# Patient Record
Sex: MALE | Race: WHITE | NOT HISPANIC OR LATINO | Employment: FULL TIME | URBAN - METROPOLITAN AREA
[De-identification: names, ages, dates, MRNs, and addresses within clinical notes are randomized per-mention and may not be internally consistent; named-entity substitution may affect disease eponyms.]

---

## 2017-06-01 ENCOUNTER — HOSPITAL ENCOUNTER (EMERGENCY)
Facility: MEDICAL CENTER | Age: 37
End: 2017-06-01
Attending: EMERGENCY MEDICINE
Payer: COMMERCIAL

## 2017-06-01 VITALS
TEMPERATURE: 98 F | DIASTOLIC BLOOD PRESSURE: 92 MMHG | WEIGHT: 205.03 LBS | RESPIRATION RATE: 16 BRPM | BODY MASS INDEX: 28.7 KG/M2 | HEIGHT: 71 IN | SYSTOLIC BLOOD PRESSURE: 173 MMHG | OXYGEN SATURATION: 94 % | HEART RATE: 89 BPM

## 2017-06-01 DIAGNOSIS — R11.2 NON-INTRACTABLE VOMITING WITH NAUSEA, UNSPECIFIED VOMITING TYPE: ICD-10-CM

## 2017-06-01 DIAGNOSIS — K22.6 MALLORY-WEISS TEAR: ICD-10-CM

## 2017-06-01 DIAGNOSIS — R79.89 LFT ELEVATION: ICD-10-CM

## 2017-06-01 LAB
ALBUMIN SERPL BCP-MCNC: 4.5 G/DL (ref 3.2–4.9)
ALBUMIN/GLOB SERPL: 1.3 G/DL
ALP SERPL-CCNC: 44 U/L (ref 30–99)
ALT SERPL-CCNC: 91 U/L (ref 2–50)
ANION GAP SERPL CALC-SCNC: 16 MMOL/L (ref 0–11.9)
APTT PPP: 23.7 SEC (ref 24.7–36)
AST SERPL-CCNC: 68 U/L (ref 12–45)
BASOPHILS # BLD AUTO: 0.8 % (ref 0–1.8)
BASOPHILS # BLD: 0.05 K/UL (ref 0–0.12)
BILIRUB SERPL-MCNC: 1 MG/DL (ref 0.1–1.5)
BUN SERPL-MCNC: 12 MG/DL (ref 8–22)
CALCIUM SERPL-MCNC: 9.1 MG/DL (ref 8.4–10.2)
CHLORIDE SERPL-SCNC: 100 MMOL/L (ref 96–112)
CO2 SERPL-SCNC: 23 MMOL/L (ref 20–33)
CREAT SERPL-MCNC: 1.2 MG/DL (ref 0.5–1.4)
EOSINOPHIL # BLD AUTO: 0.02 K/UL (ref 0–0.51)
EOSINOPHIL NFR BLD: 0.3 % (ref 0–6.9)
ERYTHROCYTE [DISTWIDTH] IN BLOOD BY AUTOMATED COUNT: 41.9 FL (ref 35.9–50)
GFR SERPL CREATININE-BSD FRML MDRD: >60 ML/MIN/1.73 M 2
GLOBULIN SER CALC-MCNC: 3.4 G/DL (ref 1.9–3.5)
GLUCOSE SERPL-MCNC: 86 MG/DL (ref 65–99)
HCT VFR BLD AUTO: 48.9 % (ref 42–52)
HGB BLD-MCNC: 17.2 G/DL (ref 14–18)
IMM GRANULOCYTES # BLD AUTO: 0.03 K/UL (ref 0–0.11)
IMM GRANULOCYTES NFR BLD AUTO: 0.5 % (ref 0–0.9)
INR PPP: 0.98 (ref 0.87–1.13)
LYMPHOCYTES # BLD AUTO: 1.01 K/UL (ref 1–4.8)
LYMPHOCYTES NFR BLD: 16.3 % (ref 22–41)
MCH RBC QN AUTO: 32.1 PG (ref 27–33)
MCHC RBC AUTO-ENTMCNC: 35.2 G/DL (ref 33.7–35.3)
MCV RBC AUTO: 91.2 FL (ref 81.4–97.8)
MONOCYTES # BLD AUTO: 0.5 K/UL (ref 0–0.85)
MONOCYTES NFR BLD AUTO: 8.1 % (ref 0–13.4)
NEUTROPHILS # BLD AUTO: 4.59 K/UL (ref 1.82–7.42)
NEUTROPHILS NFR BLD: 74 % (ref 44–72)
NRBC # BLD AUTO: 0 K/UL
NRBC BLD AUTO-RTO: 0 /100 WBC
PLATELET # BLD AUTO: 237 K/UL (ref 164–446)
PMV BLD AUTO: 7.9 FL (ref 9–12.9)
POTASSIUM SERPL-SCNC: 3.7 MMOL/L (ref 3.6–5.5)
PROT SERPL-MCNC: 7.9 G/DL (ref 6–8.2)
PROTHROMBIN TIME: 12.8 SEC (ref 12–14.6)
RBC # BLD AUTO: 5.36 M/UL (ref 4.7–6.1)
SODIUM SERPL-SCNC: 139 MMOL/L (ref 135–145)
WBC # BLD AUTO: 6.2 K/UL (ref 4.8–10.8)

## 2017-06-01 PROCEDURE — 85730 THROMBOPLASTIN TIME PARTIAL: CPT

## 2017-06-01 PROCEDURE — 700105 HCHG RX REV CODE 258: Performed by: EMERGENCY MEDICINE

## 2017-06-01 PROCEDURE — 99284 EMERGENCY DEPT VISIT MOD MDM: CPT

## 2017-06-01 PROCEDURE — 85610 PROTHROMBIN TIME: CPT

## 2017-06-01 PROCEDURE — 96361 HYDRATE IV INFUSION ADD-ON: CPT

## 2017-06-01 PROCEDURE — 80053 COMPREHEN METABOLIC PANEL: CPT

## 2017-06-01 PROCEDURE — 700111 HCHG RX REV CODE 636 W/ 250 OVERRIDE (IP): Performed by: EMERGENCY MEDICINE

## 2017-06-01 PROCEDURE — 36415 COLL VENOUS BLD VENIPUNCTURE: CPT

## 2017-06-01 PROCEDURE — 96374 THER/PROPH/DIAG INJ IV PUSH: CPT

## 2017-06-01 PROCEDURE — 85025 COMPLETE CBC W/AUTO DIFF WBC: CPT

## 2017-06-01 RX ORDER — SODIUM CHLORIDE 9 MG/ML
1000 INJECTION, SOLUTION INTRAVENOUS ONCE
Status: COMPLETED | OUTPATIENT
Start: 2017-06-01 | End: 2017-06-01

## 2017-06-01 RX ORDER — ONDANSETRON 4 MG/1
4 TABLET, ORALLY DISINTEGRATING ORAL EVERY 8 HOURS PRN
Qty: 10 TAB | Refills: 0 | Status: SHIPPED | OUTPATIENT
Start: 2017-06-01 | End: 2021-10-08

## 2017-06-01 RX ORDER — CEFTRIAXONE 2 G/1
2 INJECTION, POWDER, FOR SOLUTION INTRAMUSCULAR; INTRAVENOUS ONCE
Status: DISCONTINUED | OUTPATIENT
Start: 2017-06-01 | End: 2017-06-01

## 2017-06-01 RX ORDER — ONDANSETRON 2 MG/ML
4 INJECTION INTRAMUSCULAR; INTRAVENOUS ONCE
Status: COMPLETED | OUTPATIENT
Start: 2017-06-01 | End: 2017-06-01

## 2017-06-01 RX ADMIN — ONDANSETRON 4 MG: 2 INJECTION INTRAMUSCULAR; INTRAVENOUS at 09:44

## 2017-06-01 RX ADMIN — SODIUM CHLORIDE 1000 ML: 9 INJECTION, SOLUTION INTRAVENOUS at 09:56

## 2017-06-01 ASSESSMENT — PAIN SCALES - GENERAL
PAINLEVEL_OUTOF10: 0
PAINLEVEL_OUTOF10: 0

## 2017-06-01 NOTE — DISCHARGE INSTRUCTIONS
Nausea and Vomiting  Nausea is a sick feeling that often comes before throwing up (vomiting). Vomiting is a reflex where stomach contents come out of your mouth. Vomiting can cause severe loss of body fluids (dehydration). Children and elderly adults can become dehydrated quickly, especially if they also have diarrhea. Nausea and vomiting are symptoms of a condition or disease. It is important to find the cause of your symptoms.  CAUSES   · Direct irritation of the stomach lining. This irritation can result from increased acid production (gastroesophageal reflux disease), infection, food poisoning, taking certain medicines (such as nonsteroidal anti-inflammatory drugs), alcohol use, or tobacco use.  · Signals from the brain. These signals could be caused by a headache, heat exposure, an inner ear disturbance, increased pressure in the brain from injury, infection, a tumor, or a concussion, pain, emotional stimulus, or metabolic problems.  · An obstruction in the gastrointestinal tract (bowel obstruction).  · Illnesses such as diabetes, hepatitis, gallbladder problems, appendicitis, kidney problems, cancer, sepsis, atypical symptoms of a heart attack, or eating disorders.  · Medical treatments such as chemotherapy and radiation.  · Receiving medicine that makes you sleep (general anesthetic) during surgery.  DIAGNOSIS  Your caregiver may ask for tests to be done if the problems do not improve after a few days. Tests may also be done if symptoms are severe or if the reason for the nausea and vomiting is not clear. Tests may include:  · Urine tests.  · Blood tests.  · Stool tests.  · Cultures (to look for evidence of infection).  · X-rays or other imaging studies.  Test results can help your caregiver make decisions about treatment or the need for additional tests.  TREATMENT  You need to stay well hydrated. Drink frequently but in small amounts. You may wish to drink water, sports drinks, clear broth, or eat frozen  ice pops or gelatin dessert to help stay hydrated. When you eat, eating slowly may help prevent nausea. There are also some antinausea medicines that may help prevent nausea.  HOME CARE INSTRUCTIONS   · Take all medicine as directed by your caregiver.  · If you do not have an appetite, do not force yourself to eat. However, you must continue to drink fluids.  · If you have an appetite, eat a normal diet unless your caregiver tells you differently.  · Eat a variety of complex carbohydrates (rice, wheat, potatoes, bread), lean meats, yogurt, fruits, and vegetables.  · Avoid high-fat foods because they are more difficult to digest.  · Drink enough water and fluids to keep your urine clear or pale yellow.  · If you are dehydrated, ask your caregiver for specific rehydration instructions. Signs of dehydration may include:  · Severe thirst.  · Dry lips and mouth.  · Dizziness.  · Dark urine.  · Decreasing urine frequency and amount.  · Confusion.  · Rapid breathing or pulse.  SEEK IMMEDIATE MEDICAL CARE IF:   · You have blood or brown flecks (like coffee grounds) in your vomit.  · You have black or bloody stools.  · You have a severe headache or stiff neck.  · You are confused.  · You have severe abdominal pain.  · You have chest pain or trouble breathing.  · You do not urinate at least once every 8 hours.  · You develop cold or clammy skin.  · You continue to vomit for longer than 24 to 48 hours.  · You have a fever.  MAKE SURE YOU:   · Understand these instructions.  · Will watch your condition.  · Will get help right away if you are not doing well or get worse.     This information is not intended to replace advice given to you by your health care provider. Make sure you discuss any questions you have with your health care provider.     Document Released: 12/18/2006 Document Revised: 03/11/2013 Document Reviewed: 05/16/2012  [employer] Interactive Patient Education ©2016 [employer]    Geoffrey  Syndrome  Shelbie-Arthur syndrome refers to bleeding from tears in the lining of the tube that connects your throat to your stomach (esophagus). The tears occur at the entrance to your stomach. Usually the bleeding stops by itself after 24-48 hours. Surgery may be needed. This condition is not usually fatal.   CAUSES  The tears that cause bleeding are often caused by severe or lasting vomiting or coughing.   RISK FACTORS  · Abusing or drinking too much alcohol.  · Having certain eating disorders, such as bulimia.  SIGNS AND SYMPTOMS  · Vomiting of bright red or black coffee-ground-like material.  · Having black, tarry stools.  · Fainting or experiencing loss of consciousness.  DIAGNOSIS   The procedure often used to diagnose Shelbie-Arthur syndrome is an esophagogastroduodenoscopy (EGD). During an EGD procedure, a small, flexible, tube-like telescope (endoscope) is put into your mouth, passed through your esophagus into your stomach, and then into your small bowel. An EGD will show your health care provider where the tear is.   TREATMENT   It is necessary to stop the bleeding as soon as possible. The possible treatments to stop the bleeding include injecting medicine into bleeding areas to block (clot) the blood vessels. If bleeding is significant, it may be necessary to replace the blood lost with a blood transfusion.  HOME CARE INSTRUCTIONS  Treat any conditions that may be causing the lasting or recurrent vomiting or coughing. This may include getting help for alcoholism or an eating disorder, if this applies.  SEEK MEDICAL CARE IF:   You have nausea or vomiting.   SEEK IMMEDIATE MEDICAL CARE IF:  · You have persistent dizziness, light-headedness, or fainting.  · Your vomiting returns, or you have vomit that is bright red or looks like black coffee grounds.  · You have bloody or black, tarry-looking stools.  · You have chest pain.  · You cannot eat or drink.  MAKE SURE YOU:   · Understand these  instructions.  · Will watch your condition.  · Will get help right away if you are not doing well or get worse.     This information is not intended to replace advice given to you by your health care provider. Make sure you discuss any questions you have with your health care provider.     Document Released: 05/07/2007 Document Revised: 01/08/2016 Document Reviewed: 02/11/2015  [employer] Interactive Patient Education ©2016 [employer]vier Inc.

## 2017-06-01 NOTE — ED NOTES
ERP at bedside. Pt agrees with plan of care discussed by ERP. AIDET acknowledged with patient. IV established. Blood sent to lab.Leann in low position, side rail up for pt safety. Call light within reach. Will continue to monitor.

## 2017-06-01 NOTE — ED AVS SNAPSHOT
[employer] Access Code: [email or web address]  Expires: 7/1/2017 11:09 AM    Your email address is not on file at [facility].  Email Addresses are required for you to sign up for [employer], please contact [phone] to verify your personal information and to provide your email address prior to attempting to register for [employer].    Obinna Bryan  [address] [place] [address]    [employer]  A secure, online tool to manage your health information     [facility]’s [employer]® is a secure, online tool that connects you to your personalized health information from the privacy of your home -- day or night - making it very easy for you to manage your healthcare. Once the activation process is completed, you can even access your medical information using the [employer] kaylene, which is available for free in the Apple Kaylene store or Google Play store.     To learn more about [employer], visit [email or web address]    There are two levels of access available (as shown below):   My Chart Features  [facility] Primary Care Doctor [facility]  Specialists [facility]  Urgent  Care Non-[facility] Primary Care Doctor   Email your healthcare team securely and privately 24/7 X X X    Manage appointments: schedule your next appointment; view details of past/upcoming appointments X      Request prescription refills. X      View recent personal medical records, including lab and immunizations X X X X   View health record, including health history, allergies, medications X X X X   Read reports about your outpatient visits, procedures, consult and ER notes X X X X   See your discharge summary, which is a recap of your hospital and/or ER visit that includes your diagnosis, lab results, and care plan X X  X     How to register for [employer]:  Once your e-mail address has been verified, follow the following steps to sign up for [employer].     1. Go to  [email or web address]  2. Click on the Sign Up Now box, which takes you to the New Member Sign Up page. You  will need to provide the following information:  a. Enter your [employer] Access Code exactly as it appears at the top of this page. (You will not need to use this code after you’ve completed the sign-up process. If you do not sign up before the expiration date, you must request a new code.)   b. Enter your date of birth.   c. Enter your home email address.   d. Click Submit, and follow the next screen’s instructions.  3. Create a En[employer]iot ID. This will be your [employer] login ID and cannot be changed, so think of one that is secure and easy to remember.  4. Create a [employer] password. You can change your password at any time.  5. Enter your Password Reset Question and Answer. This can be used at a later time if you forget your password.   6. Enter your e-mail address. This allows you to receive e-mail notifications when new information is available in [employer].  7. Click Sign Up. You can now view your health information.    For assistance activating your [employer] account, call [phone]

## 2017-06-01 NOTE — ED PROVIDER NOTES
"ED Provider Note    CHIEF COMPLAINT  Chief Complaint   Patient presents with   • Blood in Vomit       [facility]  Obinna Bryan is a 37 y.o. male who presents for evaluation of blood in his vomit. The patient states that he went to work today and while speaking with someone became nauseated. He had a couple episodes of vomiting. He states he is not having any abdominal pain. He is had no fevers. He had episode of vomiting and states he noticed some drops of red blood in his emesis. He states at this point his nausea is almost completely resolved. He denies any abdominal pain. He has not been taking anti-inflammatories or aspirin. He has no history of bleeding disorders. He is on no blood thinners. He states he has had a chronic cough over the past 3 weeks or so. He denies any black or tarry stools.    REVIEW OF SYSTEMS  See HPI for further details. All other systems are negative.     PAST MEDICAL HISTORY  No past medical history on file.    FAMILY HISTORY  No family history on file.    SOCIAL HISTORY  Social History     Social History   • Marital Status: Single     Spouse Name: N/A   • Number of Children: N/A   • Years of Education: N/A     Social History Main Topics   • Smoking status: Never Smoker    • Smokeless tobacco: Never Used   • Alcohol Use: Yes      Comment: socially   • Drug Use: No   • Sexual Activity: Not on file     Other Topics Concern   • Not on file     Social History Narrative   • No narrative on file       SURGICAL HISTORY  No past surgical history on file.    CURRENT MEDICATIONS  Home Medications     **Home medications have not yet been reviewed for this encounter**          ALLERGIES  No Known Allergies    PHYSICAL EXAM  VITAL SIGNS: /92 mmHg  Pulse 113  Temp(Src) 35.9 °C (96.6 °F)  Resp 18  Ht 1.803 m (5' 11\")  Wt 93 kg (205 lb 0.4 oz)  BMI 28.61 kg/m2    Constitutional: Well developed, Well nourished, No acute distress, Non-toxic appearance.   HENT: Normocephalic, Atraumatic.   Eyes:  " EOMI, Conjunctiva normal, No discharge.   Cardiovascular: Slight tachycardia, Normal rhythm, No murmurs, No rubs, No gallops.   Thorax & Lungs: Lungs clear to auscultation bilaterally without wheezes, rales or rhonchi. No respiratory distress.   Abdomen: Soft and nontender.  Skin: Warm, Dry.   Musculoskeletal: Good range of motion in all major joints.  Neurologic: Awake alert, No focal deficits noted.           COURSE & MEDICAL DECISION MAKING  Pertinent Labs & Imaging studies reviewed. (See chart for details)  This is a 37-year-old here for evaluation of nausea, vomiting, and a small amount of bright red blood in his emesis. He is hypertensive and slightly tachycardic on arrival. He is afebrile. He states he has no history of hypertension. His blood pressures come down nicely while here in the emergency department without treatment. He is hydrated with 1 L of normal saline and treated with Zofran. He's had no further vomiting and states his nausea is resolved. Laboratories include chemistries which are notable for normal BUN and creatinine. AST and ALT are elevated at 68 and 91 respectively. Total bilirubin is normal. INR is normal. CBC shows a normal white count, normal hemoglobin, and normal platelet count. On repeat evaluation the patient is resting comfortably. His heart rate is normalized. I discussed results of the tests with him. I believe his bleeding is most likely secondary to a Shelbie-Arthur tear. I do not think he requires an emergent hospitalization or GI consultation. I discussed the elevated liver function studies with him and we have talked about alcohol consumption. The patient also chews tobacco and I've discussed the possibility of throat, esophageal, or stomach cancers as a result of his tobacco chewing. We have discussed esophageal varices which I think are very unlikely based on the fact he had such a small amount of bleeding. At this time I do not think he requires acute hospitalization or  further evaluation in the emergency department. I've referred him to the [facility] medical group for general follow-up. I have also referred him to GI consultants for follow-up. He is given a discharge instruction sheet on nausea and vomiting as well as Shelbie-Arthur tears. He is provided a prescription for Zofran.    FINAL IMPRESSION  1. Nausea and vomiting  2. Small amount of bright red blood in his emesis which I believe is secondary to a Shelbie-Arthur tear  3. Elevated liver function studies  4. Elevated blood pressure which seems to normalize while in the emergency department.         Electronically signed by: Kwesi Benavides, 6/1/2017 9:09 AM

## 2017-06-01 NOTE — ED NOTES
Assisted with the discharge process. He will be sent home with instructions to follow up with primary care provider, or return to ED if symptoms worsen.  Pt verbalizes understanding of educational materials provided during this visit and agrees to follow our recommendations.  No exacerbations of initial presentations are noted.  Scripts are given upon dismissal from our facility.  Ambulates independently and denies any new needs.

## 2017-06-01 NOTE — ED AVS SNAPSHOT
Home Care Instructions                                                                                                                Obinna Bryan   MRN: [id number]    Department:  [facility], Emergency Dept   Date of Visit:  6/1/2017            [facility], Emergency Dept    [address] [place] [address]    Phone:  [phone]      You were seen by     Kwesi Benavides M.D.      Your Diagnosis Was     Non-intractable vomiting with nausea, unspecified vomiting type     R11.2       These are the medications you received during your hospitalization from 06/01/2017 0825 to 06/01/2017 1109     Date/Time Order Dose Route Action    06/01/2017 0944 ondansetron (ZOFRAN) syringe/vial injection 4 mg 4 mg Intravenous Given    06/01/2017 0956 NS infusion 1,000 mL 1,000 mL Intravenous New Bag      Follow-up Information     1. Schedule an appointment as soon as possible for a visit with [facility].    Contact information    [address] [facility], [address]  [place]  [phone]          2. Schedule an appointment as soon as possible for a visit with [facility].    Contact information    [place] [address] [phone]        Medication Information     Review all of your home medications and newly ordered medications with your primary doctor and/or pharmacist as soon as possible. Follow medication instructions as directed by your doctor and/or pharmacist.     Please keep your complete medication list with you and share with your physician. Update the information when medications are discontinued, doses are changed, or new medications (including over-the-counter products) are added; and carry medication information at all times in the event of emergency situations.               Medication List      START taking these medications        Instructions    Morning Afternoon Evening Bedtime    ondansetron 4 MG Tbdp   Commonly known as:  ZOFRAN  ODT        Take 1 Tab by mouth every 8 hours as needed.   Dose:  4 mg                             Where to Get Your Medications      You can get these medications from any pharmacy     Bring a paper prescription for each of these medications    - ondansetron 4 MG Tbdp            Procedures and tests performed during your visit     APTT    CBC WITH DIFFERENTIAL    COMP METABOLIC PANEL    ESTIMATED GFR    PROTHROMBIN TIME (INR)        Discharge Instructions       Nausea and Vomiting  Nausea is a sick feeling that often comes before throwing up (vomiting). Vomiting is a reflex where stomach contents come out of your mouth. Vomiting can cause severe loss of body fluids (dehydration). Children and elderly adults can become dehydrated quickly, especially if they also have diarrhea. Nausea and vomiting are symptoms of a condition or disease. It is important to find the cause of your symptoms.  CAUSES   · Direct irritation of the stomach lining. This irritation can result from increased acid production (gastroesophageal reflux disease), infection, food poisoning, taking certain medicines (such as nonsteroidal anti-inflammatory drugs), alcohol use, or tobacco use.  · Signals from the brain. These signals could be caused by a headache, heat exposure, an inner ear disturbance, increased pressure in the brain from injury, infection, a tumor, or a concussion, pain, emotional stimulus, or metabolic problems.  · An obstruction in the gastrointestinal tract (bowel obstruction).  · Illnesses such as diabetes, hepatitis, gallbladder problems, appendicitis, kidney problems, cancer, sepsis, atypical symptoms of a heart attack, or eating disorders.  · Medical treatments such as chemotherapy and radiation.  · Receiving medicine that makes you sleep (general anesthetic) during surgery.  DIAGNOSIS  Your caregiver may ask for tests to be done if the problems do not improve after a few days. Tests may also be done if symptoms are severe or  if the reason for the nausea and vomiting is not clear. Tests may include:  · Urine tests.  · Blood tests.  · Stool tests.  · Cultures (to look for evidence of infection).  · X-rays or other imaging studies.  Test results can help your caregiver make decisions about treatment or the need for additional tests.  TREATMENT  You need to stay well hydrated. Drink frequently but in small amounts. You may wish to drink water, sports drinks, clear broth, or eat frozen ice pops or gelatin dessert to help stay hydrated. When you eat, eating slowly may help prevent nausea. There are also some antinausea medicines that may help prevent nausea.  HOME CARE INSTRUCTIONS   · Take all medicine as directed by your caregiver.  · If you do not have an appetite, do not force yourself to eat. However, you must continue to drink fluids.  · If you have an appetite, eat a normal diet unless your caregiver tells you differently.  · Eat a variety of complex carbohydrates (rice, wheat, potatoes, bread), lean meats, yogurt, fruits, and vegetables.  · Avoid high-fat foods because they are more difficult to digest.  · Drink enough water and fluids to keep your urine clear or pale yellow.  · If you are dehydrated, ask your caregiver for specific rehydration instructions. Signs of dehydration may include:  · Severe thirst.  · Dry lips and mouth.  · Dizziness.  · Dark urine.  · Decreasing urine frequency and amount.  · Confusion.  · Rapid breathing or pulse.  SEEK IMMEDIATE MEDICAL CARE IF:   · You have blood or brown flecks (like coffee grounds) in your vomit.  · You have black or bloody stools.  · You have a severe headache or stiff neck.  · You are confused.  · You have severe abdominal pain.  · You have chest pain or trouble breathing.  · You do not urinate at least once every 8 hours.  · You develop cold or clammy skin.  · You continue to vomit for longer than 24 to 48 hours.  · You have a fever.  MAKE SURE YOU:   · Understand these  instructions.  · Will watch your condition.  · Will get help right away if you are not doing well or get worse.     This information is not intended to replace advice given to you by your health care provider. Make sure you discuss any questions you have with your health care provider.     Document Released: 12/18/2006 Document Revised: 03/11/2013 Document Reviewed: 05/16/2012  [employer] Interactive Patient Education ©2016 [employer].    Shelbie-Arthur Syndrome  Shelbie-Arthur syndrome refers to bleeding from tears in the lining of the tube that connects your throat to your stomach (esophagus). The tears occur at the entrance to your stomach. Usually the bleeding stops by itself after 24-48 hours. Surgery may be needed. This condition is not usually fatal.   CAUSES  The tears that cause bleeding are often caused by severe or lasting vomiting or coughing.   RISK FACTORS  · Abusing or drinking too much alcohol.  · Having certain eating disorders, such as bulimia.  SIGNS AND SYMPTOMS  · Vomiting of bright red or black coffee-ground-like material.  · Having black, tarry stools.  · Fainting or experiencing loss of consciousness.  DIAGNOSIS   The procedure often used to diagnose Shelbie-Arthur syndrome is an esophagogastroduodenoscopy (EGD). During an EGD procedure, a small, flexible, tube-like telescope (endoscope) is put into your mouth, passed through your esophagus into your stomach, and then into your small bowel. An EGD will show your health care provider where the tear is.   TREATMENT   It is necessary to stop the bleeding as soon as possible. The possible treatments to stop the bleeding include injecting medicine into bleeding areas to block (clot) the blood vessels. If bleeding is significant, it may be necessary to replace the blood lost with a blood transfusion.  HOME CARE INSTRUCTIONS  Treat any conditions that may be causing the lasting or recurrent vomiting or coughing. This may include getting help for  alcoholism or an eating disorder, if this applies.  SEEK MEDICAL CARE IF:   You have nausea or vomiting.   SEEK IMMEDIATE MEDICAL CARE IF:  · You have persistent dizziness, light-headedness, or fainting.  · Your vomiting returns, or you have vomit that is bright red or looks like black coffee grounds.  · You have bloody or black, tarry-looking stools.  · You have chest pain.  · You cannot eat or drink.  MAKE SURE YOU:   · Understand these instructions.  · Will watch your condition.  · Will get help right away if you are not doing well or get worse.     This information is not intended to replace advice given to you by your health care provider. Make sure you discuss any questions you have with your health care provider.     Document Released: 05/07/2007 Document Revised: 01/08/2016 Document Reviewed: 02/11/2015  [employer] Interactive Patient Education ©2016 [employer]            Patient Information     Patient Information    Following emergency treatment: all patient requiring follow-up care must return either to a private physician or a clinic if your condition worsens before you are able to obtain further medical attention, please return to the emergency room.     Billing Information    At [facility], we work to make the billing process streamlined for our patients.  Our Representatives are here to answer any questions you may have regarding your hospital bill.  If you have insurance coverage and have supplied your insurance information to us, we will submit a claim to your insurer on your behalf.  Should you have any questions regarding your bill, we can be reached online or by phone as follows:  Online: You are able pay your bills online or live chat with our representatives about any billing questions you may have. We are here to help Monday - Friday from 8:00am to 7:30pm and 9:00am - 12:00pm on Saturdays.  Please visit [email or web address]  for more information.   Phone:   [phone] or [phone]    Please note that your emergency physician, surgeon, pathologist, radiologist, anesthesiologist, and other specialists are not employed by [facility] and will therefore bill separately for their services.  Please contact them directly for any questions concerning their bills at the numbers below:     Emer[facility] Physician Services:  [phone]  [facility]:  [phone]  [facility]:  [phone]  [facility]:  [phone]    1. Your final bill may vary from the amount quoted upon discharge if all procedures are not complete at that time, or if your doctor has additional procedures of which we are not aware. You will receive an additional bill if you return to the Emergency Department at [facility] for suture removal regardless of the facility of which the sutures were placed.     2. Please arrange for settlement of this account at the emergency registration.    3. All self-pay accounts are due in full at the time of treatment.  If you are unable to meet this obligation then payment is expected within 4-5 days.     4. If you have had radiology studies (CT, X-ray, Ultrasound, MRI), you have received a preliminary result during your emergency department visit. Please contact the radiology department [phone] to receive a copy of your final result. Please discuss the Final result with your primary physician or with the follow up physician provided.     Crisis Hotline:  [place] Crisis Hotline:  4-413-TXXDCZI or 1-570.692.6618  [place] Crisis Hotline:    1-309.894.7747 or 108-281-0512         ED Discharge Follow Up Questions    1. In order to provide you with very good care, we would like to follow up with a phone call in the next few days.  May we have your permission to contact you?     YES /  NO    2. What is the best phone number to call you? (       )_____-__________    3. What is the best time to call you?      Morning  /   Afternoon  /  Evening                   Patient Signature:  ____________________________________________________________    Date:  ____________________________________________________________

## 2017-06-01 NOTE — ED NOTES
Medicinal interventions carried out per ERP orders. Pt chewing tobacco while laying in bed. Pt instructed not to chew tobacco d/t possible aspiration. Pt continues to chew tobacco. ERP aware.

## 2019-01-19 ENCOUNTER — APPOINTMENT (OUTPATIENT)
Dept: URGENT CARE | Facility: PHYSICIAN GROUP | Age: 39
End: 2019-01-19
Payer: COMMERCIAL

## 2020-06-23 NOTE — ED AVS SNAPSHOT
6/1/2017    Obinna Bryan  [address] [place] [address]    Dear Obinna:    [facility] wants to ensure your discharge home is safe and you or your loved ones have had all of your questions answered regarding your care after you leave the hospital.    Below is a list of resources and contact information should you have any questions regarding your hospital stay, follow-up instructions, or active medical symptoms.    Questions or Concerns Regarding… Contact   Medical Questions Related to Your Discharge  (7 days a week, 8am-5pm) Contact a Nurse Care Coordinator   [phone]   Medical Questions Not Related to Your Discharge  (24 hours a day / 7 days a week)  Contact the Nurse Health Line   [phone]    Medications or Discharge Instructions Refer to your discharge packet   or contact your [facility] Primary Care Provider   [phone]   Follow-up Appointment(s) Schedule your appointment via [employer]   or contact Scheduling [phone]   Billing Review your statement via [employer]  or contact Billing [phone]   Medical Records Review your records via [employer]   or contact Medical Records [phone]     You may receive a telephone call within two days of discharge. This call is to make certain you understand your discharge instructions and have the opportunity to have any questions answered. You can also easily access your medical information, test results and upcoming appointments via the [employer] free online health management tool. You can learn more and sign up at [employer]. For assistance setting up your [employer] account, please call [phone].    Once again, we want to ensure your discharge home is safe and that you have a clear understanding of any next steps in your care. If you have any questions or concerns, please do not hesitate to contact us, we are here for you. Thank you for choosing [facility] for your healthcare needs.    Sincerely,    Your [facility] Healthcare Team          in no acute distress,  well developed, well nourished,  ambulating without difficulty , normal communication ability

## 2021-10-08 ENCOUNTER — OFFICE VISIT (OUTPATIENT)
Dept: MEDICAL GROUP | Facility: OTHER | Age: 41
End: 2021-10-08
Payer: COMMERCIAL

## 2021-10-08 VITALS
HEART RATE: 92 BPM | TEMPERATURE: 98.2 F | OXYGEN SATURATION: 96 % | WEIGHT: 240 LBS | SYSTOLIC BLOOD PRESSURE: 132 MMHG | DIASTOLIC BLOOD PRESSURE: 80 MMHG | RESPIRATION RATE: 15 BRPM | BODY MASS INDEX: 31.81 KG/M2 | HEIGHT: 73 IN

## 2021-10-08 DIAGNOSIS — M54.12 CERVICAL RADICULOPATHY AT C6: ICD-10-CM

## 2021-10-08 DIAGNOSIS — M67.911 ROTATOR CUFF DISORDER, RIGHT: ICD-10-CM

## 2021-10-08 PROBLEM — S43.491D OTHER SPRAIN OF RIGHT SHOULDER JOINT, SUBSEQUENT ENCOUNTER: Status: ACTIVE | Noted: 2019-11-25

## 2021-10-08 PROBLEM — M25.511 SHOULDER PAIN, RIGHT: Status: ACTIVE | Noted: 2021-07-23

## 2021-10-08 PROCEDURE — 99214 OFFICE O/P EST MOD 30 MIN: CPT | Performed by: FAMILY MEDICINE

## 2021-10-08 RX ORDER — METHYLPREDNISOLONE 4 MG/1
TABLET ORAL
COMMUNITY
Start: 2021-07-24 | End: 2021-10-08

## 2021-10-08 RX ORDER — MELOXICAM 15 MG/1
TABLET ORAL
COMMUNITY
Start: 2021-07-23 | End: 2022-11-30

## 2021-10-08 ASSESSMENT — ENCOUNTER SYMPTOMS: TINGLING: 1

## 2021-10-08 ASSESSMENT — PATIENT HEALTH QUESTIONNAIRE - PHQ9: CLINICAL INTERPRETATION OF PHQ2 SCORE: 0

## 2021-10-08 NOTE — LETTER
10/8/2021    Patient: Obinna Bryan  : 1980  Provider: Sudhakar Gaxiola M.D.      RETURN TO WORK    BODY PART: RIGHT SHOULDER  EMPLOYER:[employer]  DATE OF INJURY:    It is the inured worker's responsibility to inform the employer of current work status.    CURRENT RESTRICTIONS: LIGHT DUTY  LIMIT LIFTING TO LESS THAN 50LBS ON RIGHT      CONDITION STABLE: NO  CONDITION RATABLE: NO    RETURN VISIT: Return if symptoms worsen or fail to improve.    Electronically Signed: Sudhakar Gaxiola M.D.

## 2021-10-08 NOTE — PROGRESS NOTES
" Subjective:   Obinna Bryan is a 41 y.o. male here for the evaluation and management of No chief complaint on file.      Problem   Rotator Cuff Disorder, Right   Cervical Radiculopathy At C6   Cervical Radiculopathy   Shoulder Pain, Right   Other Sprain of Right Shoulder Joint, Subsequent Encounter   Right shoulder pain-ongoing, deep in location with minimal distal radiation, associated weakness.    Suspected C6 radiculopathy-patient reports pain is moderated with only intermittent numbness that seems fairly limited. He reports pain levels overall have come down      Review of Systems   Musculoskeletal: Positive for joint pain.   Neurological: Positive for tingling.       Current Outpatient Medications   Medication Sig Dispense Refill   • meloxicam (MOBIC) 15 MG tablet TAKE 1 TAB BY MOUTH ONCE DAILY FOR PAIN AND INFLAMMATION. (Patient not taking: Reported on 10/8/2021)       No current facility-administered medications for this visit.     Allergies  Patient has no known allergies.    No past medical history on file.  Patient Active Problem List    Diagnosis Date Noted   • Rotator cuff disorder, right 10/08/2021   • Cervical radiculopathy at C6 10/08/2021   • Cervical radiculopathy 07/23/2021   • Shoulder pain, right 07/23/2021   • Other sprain of right shoulder joint, subsequent encounter 11/25/2019       Past Surgical History  Past Surgical History:   Procedure Laterality Date   • HAND SURGERY      rt hand metal plate broken bone    • [id number]      lt knee legament    • SHOULDER SURGERY      rt shoulder         Objective:     Vitals:    10/08/21 1311   BP: 132/80   BP Location: Left arm   Patient Position: Sitting   BP Cuff Size: Adult   Pulse: 92   Resp: 15   Temp: 36.8 °C (98.2 °F)   TempSrc: Temporal   SpO2: 96%   Weight: 109 kg (240 lb)   Height: 1.854 m (6' 1\")     Body mass index is 31.66 kg/m².     Physical Exam     Right shoulder-surgical scarring, full motion with mild pain at the extremes, strength is " 5 out of 5 throughout there is mild reproduction of pain with testing supraspinatus, sensations intact to light touch        Assessment and Plan:   Obinna Bryan is a 41 y.o. male with a No chief complaint on file.     The following was discussed with the patient today.    Problem List Items Addressed This Visit     Rotator cuff disorder, right    Cervical radiculopathy at C6      Imaging reviewed and discussed, I agree with the radiologist regarding the right shoulder there is a clear tear of the supraspinatus and I would recommend orthopedic follow-up which is planned on October 13. Regarding industrial causation, he suffered a previous rotator cuff tear as an industrial injury and there is appears to be a recurrent tear and I would support claim reopening given the objective findings as demonstrated on the MRI. At this point he attributes his right shoulder and arm discomfort to the rotator cuff tear. Certainly his cervical spine imaging was reviewed and he does have some potential findings which could produce a radiculopathy but there is no strong indication of a radiculopathy on my examination today.    Followup: Return if symptoms worsen or fail to improve.    Sudhakar Gaxiola M.D.

## 2021-10-11 ENCOUNTER — TELEPHONE (OUTPATIENT)
Dept: MEDICAL GROUP | Facility: OTHER | Age: 41
End: 2021-10-11

## 2021-10-11 NOTE — LETTER
10/11/2021    Patient: Obinna Bryan  : 1980  Provider: Sudhakar Gaxiola M.D.      RETURN TO WORK    BODY PART: RIGHT SHOULDERR  EMPLOYER: [employer]  DATE OF INJURY:    It is the inured worker's responsibility to inform the employer of current work status.    CURRENT RESTRICTIONS: LIGHT DUTY  LIMIT LIFTING TO LESS THAN 50 LBS ON RIGHT    PENDING ORTHOPEDIC EVALUATION FOR FURTHER TREATMENT    CONDITION STABLE: NO  CONDITION RATABLE: NO    RETURN VISIT: AFTER ORTHOPEDIC EVALUATION IF NEEDED    Electronically Signed: Sudhakar Gaxiola M.D.

## 2022-03-09 ENCOUNTER — APPOINTMENT (RX ONLY)
Dept: URBAN - METROPOLITAN AREA CLINIC 22 | Facility: CLINIC | Age: 42
Setting detail: DERMATOLOGY
End: 2022-03-09

## 2022-03-09 DIAGNOSIS — L663 OTHER SPECIFIED DISEASES OF HAIR AND HAIR FOLLICLES: ICD-10-CM

## 2022-03-09 DIAGNOSIS — L738 OTHER SPECIFIED DISEASES OF HAIR AND HAIR FOLLICLES: ICD-10-CM

## 2022-03-09 DIAGNOSIS — L82.1 OTHER SEBORRHEIC KERATOSIS: ICD-10-CM

## 2022-03-09 DIAGNOSIS — L73.9 FOLLICULAR DISORDER, UNSPECIFIED: ICD-10-CM

## 2022-03-09 PROBLEM — L02.821 FURUNCLE OF HEAD [ANY PART, EXCEPT FACE]: Status: ACTIVE | Noted: 2022-03-09

## 2022-03-09 PROCEDURE — 99203 OFFICE O/P NEW LOW 30 MIN: CPT

## 2022-03-09 PROCEDURE — ? COUNSELING

## 2022-03-09 ASSESSMENT — LOCATION ZONE DERM: LOCATION ZONE: SCALP

## 2022-03-09 ASSESSMENT — LOCATION DETAILED DESCRIPTION DERM
LOCATION DETAILED: LEFT SUPERIOR PARIETAL SCALP
LOCATION DETAILED: RIGHT MEDIAL FRONTAL SCALP

## 2022-03-09 ASSESSMENT — LOCATION SIMPLE DESCRIPTION DERM
LOCATION SIMPLE: SCALP
LOCATION SIMPLE: RIGHT SCALP

## 2022-11-30 ENCOUNTER — TELEPHONE (OUTPATIENT)
Dept: INTERNAL MEDICINE | Facility: OTHER | Age: 42
End: 2022-11-30

## 2022-11-30 PROBLEM — M54.12 CERVICAL RADICULOPATHY: Status: RESOLVED | Noted: 2021-07-23 | Resolved: 2022-11-30

## 2022-11-30 PROBLEM — S43.491D OTHER SPRAIN OF RIGHT SHOULDER JOINT, SUBSEQUENT ENCOUNTER: Status: RESOLVED | Noted: 2019-11-25 | Resolved: 2022-11-30

## 2022-11-30 PROBLEM — M25.511 SHOULDER PAIN, RIGHT: Status: RESOLVED | Noted: 2021-07-23 | Resolved: 2022-11-30

## 2023-08-23 ENCOUNTER — APPOINTMENT (RX ONLY)
Dept: URBAN - METROPOLITAN AREA CLINIC 22 | Facility: CLINIC | Age: 43
Setting detail: DERMATOLOGY
End: 2023-08-23

## 2023-08-23 DIAGNOSIS — L21.8 OTHER SEBORRHEIC DERMATITIS: ICD-10-CM | Status: INADEQUATELY CONTROLLED

## 2023-08-23 DIAGNOSIS — L57.0 ACTINIC KERATOSIS: ICD-10-CM

## 2023-08-23 DIAGNOSIS — Z71.89 OTHER SPECIFIED COUNSELING: ICD-10-CM

## 2023-08-23 PROCEDURE — 17000 DESTRUCT PREMALG LESION: CPT

## 2023-08-23 PROCEDURE — ? SUNSCREEN RECOMMENDATIONS

## 2023-08-23 PROCEDURE — ? LIQUID NITROGEN

## 2023-08-23 PROCEDURE — ? PRESCRIPTION

## 2023-08-23 PROCEDURE — ? COUNSELING

## 2023-08-23 PROCEDURE — 99214 OFFICE O/P EST MOD 30 MIN: CPT | Mod: 25

## 2023-08-23 PROCEDURE — 17003 DESTRUCT PREMALG LES 2-14: CPT

## 2023-08-23 RX ORDER — CLOBETASOL PROPIONATE 0.5 MG/ML
SOLUTION TOPICAL QHS
Qty: 50 | Refills: 1 | Status: ERX | COMMUNITY
Start: 2023-08-23

## 2023-08-23 RX ORDER — KETOCONAZOLE 20 MG/ML
SHAMPOO, SUSPENSION TOPICAL DAILY
Qty: 120 | Refills: 1 | Status: ERX | COMMUNITY
Start: 2023-08-23

## 2023-08-23 RX ADMIN — KETOCONAZOLE: 20 SHAMPOO, SUSPENSION TOPICAL at 00:00

## 2023-08-23 RX ADMIN — CLOBETASOL PROPIONATE: 0.5 SOLUTION TOPICAL at 00:00

## 2023-08-23 ASSESSMENT — LOCATION DETAILED DESCRIPTION DERM
LOCATION DETAILED: LEFT SUPERIOR MEDIAL FOREHEAD
LOCATION DETAILED: LEFT MEDIAL FRONTAL SCALP
LOCATION DETAILED: SUPERIOR MID FOREHEAD

## 2023-08-23 ASSESSMENT — LOCATION SIMPLE DESCRIPTION DERM
LOCATION SIMPLE: SUPERIOR FOREHEAD
LOCATION SIMPLE: LEFT SCALP
LOCATION SIMPLE: LEFT FOREHEAD

## 2023-08-23 ASSESSMENT — LOCATION ZONE DERM
LOCATION ZONE: FACE
LOCATION ZONE: SCALP

## 2023-08-23 ASSESSMENT — SEVERITY ASSESSMENT: HOW SEVERE IS THIS PATIENT'S CONDITION?: MILD

## 2023-08-23 NOTE — PROCEDURE: LIQUID NITROGEN
Detail Level: Detailed
Show Applicator Variable?: Yes
Consent: The patient's consent was obtained including but not limited to risks of crusting, scabbing, blistering, scarring, darker or lighter pigmentary change, recurrence, incomplete removal and infection.
Render Note In Bullet Format When Appropriate: No
Post-Care Instructions: I reviewed with the patient in detail post-care instructions. Patient is to wear sunprotection, and avoid picking at any of the treated lesions. Pt may apply Vaseline to crusted or scabbing areas.
Number Of Freeze-Thaw Cycles: 2 freeze-thaw cycles
Duration Of Freeze Thaw-Cycle (Seconds): 3

## 2024-04-17 ENCOUNTER — OFFICE VISIT (OUTPATIENT)
Dept: MEDICAL GROUP | Facility: PHYSICIAN GROUP | Age: 44
End: 2024-04-17
Payer: COMMERCIAL

## 2024-04-17 VITALS
BODY MASS INDEX: 36.82 KG/M2 | OXYGEN SATURATION: 96 % | HEIGHT: 71 IN | DIASTOLIC BLOOD PRESSURE: 78 MMHG | HEART RATE: 85 BPM | SYSTOLIC BLOOD PRESSURE: 132 MMHG | WEIGHT: 263 LBS | TEMPERATURE: 97.3 F

## 2024-04-17 DIAGNOSIS — G47.9 SLEEP DISTURBANCE: ICD-10-CM

## 2024-04-17 DIAGNOSIS — Z00.00 PREVENTATIVE HEALTH CARE: ICD-10-CM

## 2024-04-17 DIAGNOSIS — R53.83 OTHER FATIGUE: ICD-10-CM

## 2024-04-17 DIAGNOSIS — Z11.59 NEED FOR HEPATITIS C SCREENING TEST: ICD-10-CM

## 2024-04-17 DIAGNOSIS — Z23 NEED FOR VACCINATION: ICD-10-CM

## 2024-04-17 PROCEDURE — 3078F DIAST BP <80 MM HG: CPT | Performed by: STUDENT IN AN ORGANIZED HEALTH CARE EDUCATION/TRAINING PROGRAM

## 2024-04-17 PROCEDURE — 90715 TDAP VACCINE 7 YRS/> IM: CPT | Performed by: STUDENT IN AN ORGANIZED HEALTH CARE EDUCATION/TRAINING PROGRAM

## 2024-04-17 PROCEDURE — 90471 IMMUNIZATION ADMIN: CPT | Performed by: STUDENT IN AN ORGANIZED HEALTH CARE EDUCATION/TRAINING PROGRAM

## 2024-04-17 PROCEDURE — 99204 OFFICE O/P NEW MOD 45 MIN: CPT | Mod: 25 | Performed by: STUDENT IN AN ORGANIZED HEALTH CARE EDUCATION/TRAINING PROGRAM

## 2024-04-17 PROCEDURE — 3075F SYST BP GE 130 - 139MM HG: CPT | Performed by: STUDENT IN AN ORGANIZED HEALTH CARE EDUCATION/TRAINING PROGRAM

## 2024-04-17 ASSESSMENT — PATIENT HEALTH QUESTIONNAIRE - PHQ9: CLINICAL INTERPRETATION OF PHQ2 SCORE: 0

## 2024-04-17 NOTE — PROGRESS NOTES
Verbal consent was acquired by the patient to use [employer] ambient listening note generation during this visit.    Subjective:     HPI:   History of Present Illness  The patient is a 44-year-old male who presents to the office to establish care. He has multiple concerns.    The patient underwent a testosterone test at his workplace in 01/2024, which yielded a level of 430. However, he reports symptoms which have made him concerned for hypogonadism. He reports persistent fatigue, difficulty concentrating, and decreased libido, which have been ongoing since 2020.     His sleep pattern has been disrupted due to his 48-hour work shifts. He is typically unable to sleep for more than a few hours without waking up.  Since transitioning to a regular sleep schedule, he has noticed increased snoring, which is disruptive to his wife. He occasionally uses a CBD gummy to aid sleep, which provides temporary relief. His wife has not observed any episodes of apnea during sleep, but he reports nightly snoring, morning headaches, and daytime fatigue.     The patient is also concerned about his blood sugar levels. He reports brief episodes of sudden sweating and shakiness. He has not checked his blood sugar levels before. Occurs approximately once a week. Symptoms are random, non-exertional. but has not checked his blood sugar levels before. He denies any chest pain, palpitations, shortness of breath, or dizziness.      Patient History:  History reviewed. No pertinent past medical history.    Past Surgical History:   Procedure Laterality Date    HAND SURGERY      rt hand metal plate broken bone     [id number]      lt knee legament     SHOULDER SURGERY      rt shoulder        Social History     Socioeconomic History    Marital status: Single   Tobacco Use    Smoking status: Never    Smokeless tobacco: Former     Types: Chew   Vaping Use    Vaping Use: Never used   Substance and Sexual Activity    Alcohol use: Not Currently      "Comment: stopped 6 months ago    Drug use: No       Family History   Problem Relation Age of Onset    Diabetes Maternal Uncle     Stroke Maternal Grandmother     Cancer Neg Hx     Ovarian Cancer Neg Hx     Tubal Cancer Neg Hx     Peritoneal Cancer Neg Hx     Colorectal Cancer Neg Hx     Breast Cancer Neg Hx     Heart Disease Neg Hx          Health Maintenance: Completed    ROS:  See HPI    Objective:     Exam:  /78 (BP Location: Left arm, Patient Position: Sitting, BP Cuff Size: Adult long)   Pulse 85   Temp 36.3 °C (97.3 °F) (Temporal)   Ht 1.803 m (5' 11\")   Wt 119 kg (263 lb)   SpO2 96%   BMI 36.68 kg/m²  Body mass index is 36.68 kg/m².    Physical Exam  Constitutional:       General: He is not in acute distress.  HENT:      Mouth/Throat:      Mouth: Mucous membranes are moist.   Eyes:      Extraocular Movements: Extraocular movements intact.   Cardiovascular:      Rate and Rhythm: Normal rate and regular rhythm.      Heart sounds: No murmur heard.     No friction rub. No gallop.   Pulmonary:      Effort: Pulmonary effort is normal.      Breath sounds: No wheezing, rhonchi or rales.   Musculoskeletal:      Cervical back: Neck supple.   Skin:     General: Skin is warm and dry.   Neurological:      Mental Status: He is alert.   Psychiatric:         Mood and Affect: Mood normal.           Results        Assessment & Plan:     1. Other fatigue  Comp Metabolic Panel    TSH WITH REFLEX TO FT4    CBC WITHOUT DIFFERENTIAL    Testosterone, Free & Total, Adult Male (w/SHBG)    Overnight Home Sleep Study      2. Sleep disturbance        3. Preventative health care  HEMOGLOBIN A1C    Lipid Profile      4. Need for hepatitis C screening test  HEP C VIRUS ANTIBODY      5. Need for vaccination  Tdap =>6yo IM          Assessment & Plan  1. Fatigue.  Laboratory tests as above will be conducted to further evaluate the patient's condition.    2. Sleep disturbances.  A sleep study will be ordered as part of the patient's " work-up.    3. Health maintenance.  The patient was informed that colon cancer screening begins at age 45. He declines at this time. The patient will receive his Tdap vaccine today.    Follow-up  The patient is scheduled for a follow-up visit in 6 weeks to review his laboratory results.          Please note that this dictation was created using voice recognition software. I have made every reasonable attempt to correct obvious errors, but I expect that there are errors of grammar and possibly content that I did not discover before finalizing the note.

## 2024-05-07 ENCOUNTER — TELEPHONE (OUTPATIENT)
Dept: HEALTH INFORMATION MANAGEMENT | Facility: OTHER | Age: 44
End: 2024-05-07
Payer: COMMERCIAL

## 2025-04-03 ENCOUNTER — OFFICE VISIT (OUTPATIENT)
Dept: MEDICAL GROUP | Facility: PHYSICIAN GROUP | Age: 45
End: 2025-04-03
Payer: COMMERCIAL

## 2025-04-03 VITALS
OXYGEN SATURATION: 98 % | WEIGHT: 265 LBS | TEMPERATURE: 97.8 F | HEART RATE: 87 BPM | SYSTOLIC BLOOD PRESSURE: 138 MMHG | BODY MASS INDEX: 37.1 KG/M2 | HEIGHT: 71 IN | DIASTOLIC BLOOD PRESSURE: 80 MMHG

## 2025-04-03 DIAGNOSIS — E78.49 OTHER HYPERLIPIDEMIA: ICD-10-CM

## 2025-04-03 DIAGNOSIS — R93.1 AGATSTON CORONARY ARTERY CALCIUM SCORE GREATER THAN 400: ICD-10-CM

## 2025-04-03 DIAGNOSIS — R07.9 CHEST PAIN, UNSPECIFIED TYPE: ICD-10-CM

## 2025-04-03 PROBLEM — J45.990 EXERCISE-INDUCED ASTHMA: Status: ACTIVE | Noted: 2025-04-03

## 2025-04-03 PROCEDURE — 3079F DIAST BP 80-89 MM HG: CPT | Performed by: STUDENT IN AN ORGANIZED HEALTH CARE EDUCATION/TRAINING PROGRAM

## 2025-04-03 PROCEDURE — 99214 OFFICE O/P EST MOD 30 MIN: CPT | Performed by: STUDENT IN AN ORGANIZED HEALTH CARE EDUCATION/TRAINING PROGRAM

## 2025-04-03 PROCEDURE — 3075F SYST BP GE 130 - 139MM HG: CPT | Performed by: STUDENT IN AN ORGANIZED HEALTH CARE EDUCATION/TRAINING PROGRAM

## 2025-04-03 RX ORDER — ROSUVASTATIN CALCIUM 20 MG/1
20 TABLET, COATED ORAL DAILY
Qty: 90 TABLET | Refills: 3 | Status: SHIPPED | OUTPATIENT
Start: 2025-04-03 | End: 2025-04-29 | Stop reason: SDUPTHER

## 2025-04-03 RX ORDER — ASPIRIN 81 MG/1
81 TABLET ORAL DAILY
COMMUNITY

## 2025-04-03 RX ORDER — ROSUVASTATIN CALCIUM 40 MG/1
40 TABLET, COATED ORAL DAILY
Qty: 100 TABLET | Refills: 3 | Status: SHIPPED | OUTPATIENT
Start: 2025-04-03 | End: 2025-04-03 | Stop reason: SDUPTHER

## 2025-04-03 ASSESSMENT — PATIENT HEALTH QUESTIONNAIRE - PHQ9: CLINICAL INTERPRETATION OF PHQ2 SCORE: 0

## 2025-04-03 ASSESSMENT — ENCOUNTER SYMPTOMS
SHORTNESS OF BREATH: 0
CHILLS: 0
HEADACHES: 0
FEVER: 0
DIZZINESS: 0

## 2025-04-04 NOTE — PROGRESS NOTES
Verbal consent was acquired by the patient to use [employer] ambient listening note generation during this visit.    Subjective:     HPI:   History of Present Illness  45-year-old male presents to go over recent results.    He is a  and recently underwent his yearly employment health assessment, which included a treadmill stress ECG and a CT coronary artery calcium score. He reports his stress test was normal but his calcium score is significantly elevated at 2982. The imaging was completed at [facility] and report is scanned into chart. He has had an exercise stress test yearly since  and reports results have always been normal.     He has a history of elevated cholesterol not on statin therapy. Does not currently take any medications.    He reports a 3-4 month history of intermittent left-sided chest pain, occurring unpredictably, during exercise or rest, lasting anywhere from a few minutes to 40 minutes. No associated dyspnea, occasional dizziness. History of childhood exercise-induced asthma, exacerbated by cold weather, describes current symptoms as different.     Family history of heart disease; maternal grandfather  of heart attack at 70.      FAMILY HISTORY  - Maternal grandfather  of a heart attack at age 70, no other family history of CAD        Past medical, surgical, family, and social history were reviewed and updated.     Medications:    Current Outpatient Medications:     aspirin 81 MG EC tablet, Take 81 mg by mouth every day., Disp: , Rfl:     rosuvastatin (CRESTOR) 20 MG Tab, Take 1 Tablet by mouth every day., Disp: 90 Tablet, Rfl: 3    Allergies:  Patient has no known allergies.      Health Maintenance: Completed    ROS:  Review of Systems   Constitutional:  Negative for chills and fever.   Respiratory:  Negative for shortness of breath.    Cardiovascular:  Positive for chest pain.   Neurological:  Negative for dizziness and headaches.       Objective:     Exam:  /80 (BP  "Location: Left arm, Patient Position: Sitting, BP Cuff Size: Adult)   Pulse 87   Temp 36.6 °C (97.8 °F) (Temporal)   Ht 1.803 m (5' 11\")   Wt 120 kg (265 lb)   SpO2 98%   BMI 36.96 kg/m²  Body mass index is 36.96 kg/m².    Physical Exam  Constitutional:       General: He is not in acute distress.  HENT:      Mouth/Throat:      Mouth: Mucous membranes are moist.   Eyes:      Extraocular Movements: Extraocular movements intact.   Cardiovascular:      Rate and Rhythm: Normal rate and regular rhythm.      Heart sounds: No murmur heard.     No friction rub. No gallop.   Pulmonary:      Effort: Pulmonary effort is normal.      Breath sounds: No wheezing, rhonchi or rales.   Musculoskeletal:      Cervical back: Neck supple.   Skin:     General: Skin is warm and dry.   Neurological:      Mental Status: He is alert.   Psychiatric:         Mood and Affect: Mood normal.         Results      Assessment & Plan:     1. Agatston coronary artery calcium score greater than 400  REFERRAL TO CARDIOLOGY    rosuvastatin (CRESTOR) 20 MG Tab      2. Chest pain, unspecified type  REFERRAL TO CARDIOLOGY      3. Other hyperlipidemia  Referral to Genetic Research Studies    rosuvastatin (CRESTOR) 20 MG Tab          Assessment & Plan  1. Elevated CAC score: Significantly elevated coronary artery calcium score of 2982.  He does have a history of hyperlipidemia, never treated with cholesterol-lowering agents.  He has a family history of coronary artery disease in his grandfather, who had a heart attack at age 70. He has had mild intermittent chest pain for at least the past 3 months, not described as exertional. No active chest pain in the clinic. Negative recent treadmill stress test done with his employer.  - Referral placed to cardiology for further evaluation and risk assessment, especially considering his age  - Start high-intensity statin and aspirin 81 mg daily  - Lipid panel previously ordered, he was asked to send results of his " recent labs, also completed through his employer      HCM:   - Recommend HNP genetic testing, which includes genetic variants associated with familial hypercholesterolemia     Follow-up: 6 weeks  - ER precautions were reviewed          Please note that this dictation was created using voice recognition software. I have made every reasonable attempt to correct obvious errors, but I expect that there are errors of grammar and possibly content that I did not discover before finalizing the note.

## 2025-04-08 ENCOUNTER — APPOINTMENT (OUTPATIENT)
Dept: RADIOLOGY | Facility: MEDICAL CENTER | Age: 45
End: 2025-04-08
Attending: EMERGENCY MEDICINE
Payer: COMMERCIAL

## 2025-04-08 ENCOUNTER — HOSPITAL ENCOUNTER (OUTPATIENT)
Facility: MEDICAL CENTER | Age: 45
End: 2025-04-09
Attending: EMERGENCY MEDICINE | Admitting: HOSPITALIST
Payer: COMMERCIAL

## 2025-04-08 DIAGNOSIS — R07.9 CHEST PAIN, UNSPECIFIED TYPE: ICD-10-CM

## 2025-04-08 PROBLEM — E87.6 HYPOKALEMIA: Status: ACTIVE | Noted: 2025-04-08

## 2025-04-08 PROBLEM — E66.812 CLASS 2 OBESITY WITHOUT SERIOUS COMORBIDITY WITH BODY MASS INDEX (BMI) OF 37.0 TO 37.9 IN ADULT: Status: ACTIVE | Noted: 2025-04-08

## 2025-04-08 PROBLEM — I10 PRIMARY HYPERTENSION: Status: ACTIVE | Noted: 2025-04-08

## 2025-04-08 PROBLEM — E78.2 MIXED HYPERLIPIDEMIA: Status: ACTIVE | Noted: 2025-04-08

## 2025-04-08 PROBLEM — R03.0 ELEVATED BLOOD PRESSURE READING: Status: ACTIVE | Noted: 2025-04-08

## 2025-04-08 PROBLEM — R94.31 ABNORMAL EKG: Status: ACTIVE | Noted: 2025-04-08

## 2025-04-08 LAB
ALBUMIN SERPL BCP-MCNC: 4.3 G/DL (ref 3.2–4.9)
ALBUMIN/GLOB SERPL: 1.3 G/DL
ALP SERPL-CCNC: 72 U/L (ref 30–99)
ALT SERPL-CCNC: 34 U/L (ref 2–50)
ANION GAP SERPL CALC-SCNC: 13 MMOL/L (ref 7–16)
AST SERPL-CCNC: 31 U/L (ref 12–45)
BASOPHILS # BLD AUTO: 0.9 % (ref 0–1.8)
BASOPHILS # BLD: 0.07 K/UL (ref 0–0.12)
BILIRUB SERPL-MCNC: 0.5 MG/DL (ref 0.1–1.5)
BUN SERPL-MCNC: 10 MG/DL (ref 8–22)
CALCIUM ALBUM COR SERPL-MCNC: 9.2 MG/DL (ref 8.5–10.5)
CALCIUM SERPL-MCNC: 9.4 MG/DL (ref 8.5–10.5)
CHLORIDE SERPL-SCNC: 101 MMOL/L (ref 96–112)
CO2 SERPL-SCNC: 24 MMOL/L (ref 20–33)
CREAT SERPL-MCNC: 0.98 MG/DL (ref 0.5–1.4)
D DIMER PPP IA.FEU-MCNC: <0.27 UG/ML (FEU) (ref 0–0.5)
EKG IMPRESSION: NORMAL
EOSINOPHIL # BLD AUTO: 0.23 K/UL (ref 0–0.51)
EOSINOPHIL NFR BLD: 2.9 % (ref 0–6.9)
ERYTHROCYTE [DISTWIDTH] IN BLOOD BY AUTOMATED COUNT: 40.2 FL (ref 35.9–50)
GFR SERPLBLD CREATININE-BSD FMLA CKD-EPI: 96 ML/MIN/1.73 M 2
GLOBULIN SER CALC-MCNC: 3.2 G/DL (ref 1.9–3.5)
GLUCOSE SERPL-MCNC: 143 MG/DL (ref 65–99)
HCT VFR BLD AUTO: 49.4 % (ref 42–52)
HGB BLD-MCNC: 17.5 G/DL (ref 14–18)
IMM GRANULOCYTES # BLD AUTO: 0.02 K/UL (ref 0–0.11)
IMM GRANULOCYTES NFR BLD AUTO: 0.2 % (ref 0–0.9)
LYMPHOCYTES # BLD AUTO: 3.41 K/UL (ref 1–4.8)
LYMPHOCYTES NFR BLD: 42.4 % (ref 22–41)
MCH RBC QN AUTO: 31.6 PG (ref 27–33)
MCHC RBC AUTO-ENTMCNC: 35.4 G/DL (ref 32.3–36.5)
MCV RBC AUTO: 89.2 FL (ref 81.4–97.8)
MONOCYTES # BLD AUTO: 0.74 K/UL (ref 0–0.85)
MONOCYTES NFR BLD AUTO: 9.2 % (ref 0–13.4)
NEUTROPHILS # BLD AUTO: 3.57 K/UL (ref 1.82–7.42)
NEUTROPHILS NFR BLD: 44.4 % (ref 44–72)
NRBC # BLD AUTO: 0 K/UL
NRBC BLD-RTO: 0 /100 WBC (ref 0–0.2)
NT-PROBNP SERPL IA-MCNC: <36 PG/ML (ref 0–125)
PLATELET # BLD AUTO: 223 K/UL (ref 164–446)
PMV BLD AUTO: 8.7 FL (ref 9–12.9)
POTASSIUM SERPL-SCNC: 3.5 MMOL/L (ref 3.6–5.5)
PROT SERPL-MCNC: 7.5 G/DL (ref 6–8.2)
RBC # BLD AUTO: 5.54 M/UL (ref 4.7–6.1)
SODIUM SERPL-SCNC: 138 MMOL/L (ref 135–145)
TROPONIN T SERPL-MCNC: 6 NG/L (ref 6–19)
TROPONIN T SERPL-MCNC: 8 NG/L (ref 6–19)
TROPONIN T SERPL-MCNC: 9 NG/L (ref 6–19)
WBC # BLD AUTO: 8 K/UL (ref 4.8–10.8)

## 2025-04-08 PROCEDURE — 99223 1ST HOSP IP/OBS HIGH 75: CPT | Performed by: HOSPITALIST

## 2025-04-08 PROCEDURE — 36415 COLL VENOUS BLD VENIPUNCTURE: CPT

## 2025-04-08 PROCEDURE — 83880 ASSAY OF NATRIURETIC PEPTIDE: CPT

## 2025-04-08 PROCEDURE — 700102 HCHG RX REV CODE 250 W/ 637 OVERRIDE(OP): Performed by: EMERGENCY MEDICINE

## 2025-04-08 PROCEDURE — 80053 COMPREHEN METABOLIC PANEL: CPT

## 2025-04-08 PROCEDURE — 85379 FIBRIN DEGRADATION QUANT: CPT

## 2025-04-08 PROCEDURE — 93005 ELECTROCARDIOGRAM TRACING: CPT | Mod: TC

## 2025-04-08 PROCEDURE — 85025 COMPLETE CBC W/AUTO DIFF WBC: CPT

## 2025-04-08 PROCEDURE — 93005 ELECTROCARDIOGRAM TRACING: CPT | Mod: TC | Performed by: EMERGENCY MEDICINE

## 2025-04-08 PROCEDURE — G0378 HOSPITAL OBSERVATION PER HR: HCPCS

## 2025-04-08 PROCEDURE — 84484 ASSAY OF TROPONIN QUANT: CPT | Mod: 91

## 2025-04-08 PROCEDURE — 71045 X-RAY EXAM CHEST 1 VIEW: CPT

## 2025-04-08 PROCEDURE — 700101 HCHG RX REV CODE 250: Performed by: EMERGENCY MEDICINE

## 2025-04-08 PROCEDURE — 700102 HCHG RX REV CODE 250 W/ 637 OVERRIDE(OP): Mod: JZ | Performed by: HOSPITALIST

## 2025-04-08 PROCEDURE — 99285 EMERGENCY DEPT VISIT HI MDM: CPT

## 2025-04-08 PROCEDURE — A9270 NON-COVERED ITEM OR SERVICE: HCPCS | Performed by: EMERGENCY MEDICINE

## 2025-04-08 PROCEDURE — A9270 NON-COVERED ITEM OR SERVICE: HCPCS | Mod: JZ | Performed by: HOSPITALIST

## 2025-04-08 RX ORDER — AMOXICILLIN 250 MG
2 CAPSULE ORAL EVERY EVENING
Status: DISCONTINUED | OUTPATIENT
Start: 2025-04-08 | End: 2025-04-09 | Stop reason: HOSPADM

## 2025-04-08 RX ORDER — ACETAMINOPHEN 325 MG/1
650 TABLET ORAL EVERY 6 HOURS PRN
Status: DISCONTINUED | OUTPATIENT
Start: 2025-04-08 | End: 2025-04-09 | Stop reason: HOSPADM

## 2025-04-08 RX ORDER — HYDROMORPHONE HYDROCHLORIDE 1 MG/ML
0.25 INJECTION, SOLUTION INTRAMUSCULAR; INTRAVENOUS; SUBCUTANEOUS
Status: DISCONTINUED | OUTPATIENT
Start: 2025-04-08 | End: 2025-04-09 | Stop reason: HOSPADM

## 2025-04-08 RX ORDER — ASPIRIN 81 MG/1
162 TABLET, CHEWABLE ORAL ONCE
Status: COMPLETED | OUTPATIENT
Start: 2025-04-08 | End: 2025-04-08

## 2025-04-08 RX ORDER — ASPIRIN 81 MG/1
81 TABLET ORAL DAILY
Status: DISCONTINUED | OUTPATIENT
Start: 2025-04-09 | End: 2025-04-09 | Stop reason: HOSPADM

## 2025-04-08 RX ORDER — OXYCODONE HYDROCHLORIDE 5 MG/1
5 TABLET ORAL
Refills: 0 | Status: DISCONTINUED | OUTPATIENT
Start: 2025-04-08 | End: 2025-04-09 | Stop reason: HOSPADM

## 2025-04-08 RX ORDER — LABETALOL HYDROCHLORIDE 5 MG/ML
10 INJECTION, SOLUTION INTRAVENOUS EVERY 4 HOURS PRN
Status: DISCONTINUED | OUTPATIENT
Start: 2025-04-08 | End: 2025-04-09 | Stop reason: HOSPADM

## 2025-04-08 RX ORDER — ENOXAPARIN SODIUM 100 MG/ML
40 INJECTION SUBCUTANEOUS DAILY
Status: DISCONTINUED | OUTPATIENT
Start: 2025-04-09 | End: 2025-04-09 | Stop reason: HOSPADM

## 2025-04-08 RX ORDER — ASPIRIN 300 MG/1
300 SUPPOSITORY RECTAL ONCE
Status: DISCONTINUED | OUTPATIENT
Start: 2025-04-08 | End: 2025-04-08

## 2025-04-08 RX ORDER — ASPIRIN 81 MG/1
81 TABLET ORAL DAILY
Status: DISCONTINUED | OUTPATIENT
Start: 2025-04-09 | End: 2025-04-08

## 2025-04-08 RX ORDER — ASPIRIN 325 MG
325 TABLET ORAL ONCE
Status: DISCONTINUED | OUTPATIENT
Start: 2025-04-08 | End: 2025-04-08

## 2025-04-08 RX ORDER — ROSUVASTATIN CALCIUM 10 MG/1
20 TABLET, COATED ORAL DAILY
Status: DISCONTINUED | OUTPATIENT
Start: 2025-04-09 | End: 2025-04-09 | Stop reason: HOSPADM

## 2025-04-08 RX ORDER — ASPIRIN 81 MG/1
324 TABLET, CHEWABLE ORAL ONCE
Status: DISCONTINUED | OUTPATIENT
Start: 2025-04-08 | End: 2025-04-08

## 2025-04-08 RX ORDER — OXYCODONE HYDROCHLORIDE 5 MG/1
2.5 TABLET ORAL
Refills: 0 | Status: DISCONTINUED | OUTPATIENT
Start: 2025-04-08 | End: 2025-04-09 | Stop reason: HOSPADM

## 2025-04-08 RX ORDER — POLYETHYLENE GLYCOL 3350 17 G/17G
1 POWDER, FOR SOLUTION ORAL
Status: DISCONTINUED | OUTPATIENT
Start: 2025-04-08 | End: 2025-04-09 | Stop reason: HOSPADM

## 2025-04-08 RX ORDER — POTASSIUM CHLORIDE 1500 MG/1
40 TABLET, EXTENDED RELEASE ORAL ONCE
Status: COMPLETED | OUTPATIENT
Start: 2025-04-08 | End: 2025-04-08

## 2025-04-08 RX ADMIN — HYOSCYAMINE SULFATE 30 ML: 0.12 ELIXIR ORAL at 15:00

## 2025-04-08 RX ADMIN — ASPIRIN 81 MG CHEWABLE TABLET 162 MG: 81 TABLET CHEWABLE at 15:00

## 2025-04-08 RX ADMIN — POTASSIUM CHLORIDE 40 MEQ: 1500 TABLET, EXTENDED RELEASE ORAL at 16:16

## 2025-04-08 SDOH — ECONOMIC STABILITY: TRANSPORTATION INSECURITY
IN THE PAST 12 MONTHS, HAS THE LACK OF TRANSPORTATION KEPT YOU FROM MEDICAL APPOINTMENTS OR FROM GETTING MEDICATIONS?: NO

## 2025-04-08 SDOH — ECONOMIC STABILITY: TRANSPORTATION INSECURITY
IN THE PAST 12 MONTHS, HAS LACK OF RELIABLE TRANSPORTATION KEPT YOU FROM MEDICAL APPOINTMENTS, MEETINGS, WORK OR FROM GETTING THINGS NEEDED FOR DAILY LIVING?: NO

## 2025-04-08 ASSESSMENT — ENCOUNTER SYMPTOMS
EYE DISCHARGE: 0
EYE REDNESS: 0
MYALGIAS: 0
FOCAL WEAKNESS: 0
FLANK PAIN: 0
NERVOUS/ANXIOUS: 0
ABDOMINAL PAIN: 0
FEVER: 0
SHORTNESS OF BREATH: 0
COUGH: 0
VOMITING: 0
BRUISES/BLEEDS EASILY: 0
CHILLS: 0
STRIDOR: 0

## 2025-04-08 ASSESSMENT — LIFESTYLE VARIABLES
DOES PATIENT WANT TO STOP DRINKING: NO
TOTAL SCORE: 0
AVERAGE NUMBER OF DAYS PER WEEK YOU HAVE A DRINK CONTAINING ALCOHOL: 0
CONSUMPTION TOTAL: NEGATIVE
ALCOHOL_USE: NO
EVER HAD A DRINK FIRST THING IN THE MORNING TO STEADY YOUR NERVES TO GET RID OF A HANGOVER: NO
HAVE YOU EVER FELT YOU SHOULD CUT DOWN ON YOUR DRINKING: NO
HAVE PEOPLE ANNOYED YOU BY CRITICIZING YOUR DRINKING: NO
EVER FELT BAD OR GUILTY ABOUT YOUR DRINKING: NO
TOTAL SCORE: 0
TOTAL SCORE: 0
ON A TYPICAL DAY WHEN YOU DRINK ALCOHOL HOW MANY DRINKS DO YOU HAVE: 0
HOW MANY TIMES IN THE PAST YEAR HAVE YOU HAD 5 OR MORE DRINKS IN A DAY: 0

## 2025-04-08 ASSESSMENT — PATIENT HEALTH QUESTIONNAIRE - PHQ9
SUM OF ALL RESPONSES TO PHQ9 QUESTIONS 1 AND 2: 0
1. LITTLE INTEREST OR PLEASURE IN DOING THINGS: NOT AT ALL
2. FEELING DOWN, DEPRESSED, IRRITABLE, OR HOPELESS: NOT AT ALL

## 2025-04-08 ASSESSMENT — COGNITIVE AND FUNCTIONAL STATUS - GENERAL
SUGGESTED CMS G CODE MODIFIER MOBILITY: CH
DAILY ACTIVITIY SCORE: 24
MOBILITY SCORE: 24
SUGGESTED CMS G CODE MODIFIER DAILY ACTIVITY: CH

## 2025-04-08 ASSESSMENT — HEART SCORE
RISK FACTORS: 1-2 RISK FACTORS
HISTORY: MODERATELY SUSPICIOUS
TROPONIN: LESS THAN OR EQUAL TO NORMAL LIMIT
AGE: 45-64
ECG: NON-SPECIFIC REPOLARIZATION DISTURBANCE
HEART SCORE: 4

## 2025-04-08 ASSESSMENT — SOCIAL DETERMINANTS OF HEALTH (SDOH)
WITHIN THE LAST YEAR, HAVE TO BEEN RAPED OR FORCED TO HAVE ANY KIND OF SEXUAL ACTIVITY BY YOUR PARTNER OR EX-PARTNER?: NO
WITHIN THE LAST YEAR, HAVE YOU BEEN AFRAID OF YOUR PARTNER OR EX-PARTNER?: NO
WITHIN THE LAST YEAR, HAVE YOU BEEN KICKED, HIT, SLAPPED, OR OTHERWISE PHYSICALLY HURT BY YOUR PARTNER OR EX-PARTNER?: NO
IN THE PAST 12 MONTHS, HAS THE ELECTRIC, GAS, OIL, OR WATER COMPANY THREATENED TO SHUT OFF SERVICE IN YOUR HOME?: NO
WITHIN THE PAST 12 MONTHS, THE FOOD YOU BOUGHT JUST DIDN'T LAST AND YOU DIDN'T HAVE MONEY TO GET MORE: NEVER TRUE
WITHIN THE PAST 12 MONTHS, YOU WORRIED THAT YOUR FOOD WOULD RUN OUT BEFORE YOU GOT THE MONEY TO BUY MORE: NEVER TRUE
WITHIN THE LAST YEAR, HAVE YOU BEEN HUMILIATED OR EMOTIONALLY ABUSED IN OTHER WAYS BY YOUR PARTNER OR EX-PARTNER?: NO

## 2025-04-08 ASSESSMENT — PAIN DESCRIPTION - PAIN TYPE
TYPE: ACUTE PAIN
TYPE: ACUTE PAIN

## 2025-04-08 NOTE — ED TRIAGE NOTES
"Chief Complaint   Patient presents with    Chest Pain     \"Low grade\"  Intermittent over the last month, became more constant today       BP (!) 159/97   Pulse (!) 103   Temp 36.8 °C (98.2 °F) (Temporal)   Resp 15   Ht 1.803 m (5' 11\")   Wt 121 kg (267 lb 6.7 oz)   SpO2 96%   BMI 37.30 kg/m²     Pt ambulated to ED by self for constant CP today, states has been intermittent over the last month, denies N/V, denies feeling short of breath.    "

## 2025-04-08 NOTE — ASSESSMENT & PLAN NOTE
No known history of primary hypertension  Probably has undiagnosed hypertension  Vital signs per policy   I will start as needed labetalol for extreme hypertension  Consider starting scheduled antihypertensive medications according to blood pressure trend

## 2025-04-08 NOTE — ASSESSMENT & PLAN NOTE
EKG shows sinus tachycardia with a rate of 96.  There is no ST elevation.  There are T wave inversions in inferior leads.  There are ST depressions and T wave inversions in leads V4-V6.  QTc is 450.    I will place on continuous cardiac monitoring  I will check and trend troponins  I will check echocardiography

## 2025-04-08 NOTE — ED PROVIDER NOTES
"ED PHYSICIAN NOTE    CHIEF COMPLAINT  Chief Complaint   Patient presents with    Chest Pain     \"Low grade\"  Intermittent over the last month, became more constant today         EXTERNAL RECORDS REVIEWED  Outpatient Notes patient recently seen by primary doctor.  He had a calcium score with his employer that was remarkably elevated.  He had a treadmill stress test fairly recently that was reportedly negative.  Was referred to cardiology.    HPI/ROS      Obinna Bryan is a 45 y.o. male who presents with chest pain.  Patient has had chest pain for months off and on.  It has been worse over the last 1 month.  It comes on out of the blue.  Nothing in particular causes the pain makes it better or worse.  It is not exertional.  Not positional.  He feels a burning a pinching or an aching sensation in his central and left side of the chest.  It started to radiate to the left arm.  No associated diaphoresis, shortness of breath, pleuritic pain.  He has not had fevers or chills.  No abdominal pain.  Denies leg swelling leg pain travel immobilization recent surgeries.    PAST MEDICAL HISTORY  Hypercholesterolemia, family history coronary disease    SOCIAL HISTORY  Social History     Tobacco Use    Smoking status: Never    Smokeless tobacco: Former     Types: Chew   Vaping Use    Vaping status: Never Used   Substance Use Topics    Alcohol use: Not Currently    Drug use: No       CURRENT MEDICATIONS  Home Medications    **Home medications have not yet been reviewed for this encounter**       Audit from Redirected Encounters    **Home medications have not yet been reviewed for this encounter**         ALLERGIES  No Known Allergies    PHYSICAL EXAM  VITAL SIGNS: BP (!) 159/97   Pulse (!) 103   Temp 36.8 °C (98.2 °F) (Temporal)   Resp 15   Ht 1.803 m (5' 11\")   Wt 121 kg (267 lb 6.7 oz)   SpO2 96%   BMI 37.30 kg/m²    Constitutional: Awake and alert  HENT: Normal inspection  Eyes: Normal inspection  Neck: Grossly normal " range of motion.  Cardiovascular: Mildly elevated heart rate, Normal rhythm.  Symmetric peripheral pulses.   Thorax & Lungs: No respiratory distress, No wheezing, No rales, No rhonchi, No chest tenderness.   Abdomen: Bowel sounds normal, soft, non-distended, nontender, no mass  Skin: No rash.  Back: No tenderness, No CVA tenderness.   Extremities: No clubbing, cyanosis, edema, no Homans or cords.  Neurologic: Grossly normal   Psychiatric: Normal for situation     DIAGNOSTIC STUDIES / PROCEDURES  LABS/EKG  Results for orders placed or performed during the hospital encounter of 25   EKG    Collection Time: 25  2:34 PM   Result Value Ref Range    Report       [facility] Emergency Dept.    Test Date:  2025  Pt Name:    SHYANNE SILVERIO              Department: [facility]  MRN:        [id number]                      Room:  Gender:     Male                         Technician: [id number]  :        1980                   Requested By:ER TRIAGE PROTOCOL  Order #:    [id number]                    Reading MD: SEKOU BORDEN MD    Measurements  Intervals                                Axis  Rate:       96                           P:          70  MS:         148                          QRS:        103  QRSD:       104                          T:          -66  QT:         356  QTc:        450    Interpretive Statements  Sinus rhythm  Inferior infarct, age indeterminate  Lateral leads are also involved  No previous ECG available for comparison  Electronically Signed On 2025 14:34:49 PDT by SEKOU BORDEN MD     CBC with Differential    Collection Time: 25  2:35 PM   Result Value Ref Range    WBC 8.0 4.8 - 10.8 K/uL    RBC 5.54 4.70 - 6.10 M/uL    Hemoglobin 17.5 14.0 - 18.0 g/dL    Hematocrit 49.4 42.0 - 52.0 %    MCV 89.2 81.4 - 97.8 fL    MCH 31.6 27.0 - 33.0 pg    MCHC 35.4 32.3 - 36.5 g/dL    RDW 40.2 35.9 - 50.0 fL    Platelet Count 223 164 - 446 K/uL    MPV 8.7 (L) 9.0 -  12.9 fL    Neutrophils-Polys 44.40 44.00 - 72.00 %    Lymphocytes 42.40 (H) 22.00 - 41.00 %    Monocytes 9.20 0.00 - 13.40 %    Eosinophils 2.90 0.00 - 6.90 %    Basophils 0.90 0.00 - 1.80 %    Immature Granulocytes 0.20 0.00 - 0.90 %    Nucleated RBC 0.00 0.00 - 0.20 /100 WBC    Neutrophils (Absolute) 3.57 1.82 - 7.42 K/uL    Lymphs (Absolute) 3.41 1.00 - 4.80 K/uL    Monos (Absolute) 0.74 0.00 - 0.85 K/uL    Eos (Absolute) 0.23 0.00 - 0.51 K/uL    Baso (Absolute) 0.07 0.00 - 0.12 K/uL    Immature Granulocytes (abs) 0.02 0.00 - 0.11 K/uL    NRBC (Absolute) 0.00 K/uL   Complete Metabolic Panel (CMP)    Collection Time: 04/08/25  2:35 PM   Result Value Ref Range    Sodium 138 135 - 145 mmol/L    Potassium 3.5 (L) 3.6 - 5.5 mmol/L    Chloride 101 96 - 112 mmol/L    Co2 24 20 - 33 mmol/L    Anion Gap 13.0 7.0 - 16.0    Glucose 143 (H) 65 - 99 mg/dL    Bun 10 8 - 22 mg/dL    Creatinine 0.98 0.50 - 1.40 mg/dL    Calcium 9.4 8.5 - 10.5 mg/dL    Correct Calcium 9.2 8.5 - 10.5 mg/dL    AST(SGOT) 31 12 - 45 U/L    ALT(SGPT) 34 2 - 50 U/L    Alkaline Phosphatase 72 30 - 99 U/L    Total Bilirubin 0.5 0.1 - 1.5 mg/dL    Albumin 4.3 3.2 - 4.9 g/dL    Total Protein 7.5 6.0 - 8.2 g/dL    Globulin 3.2 1.9 - 3.5 g/dL    A-G Ratio 1.3 g/dL   proBrain Natriuretic Peptide, NT (BNP)    Collection Time: 04/08/25  2:35 PM   Result Value Ref Range    NT-proBNP <36 0 - 125 pg/mL   Troponins NOW    Collection Time: 04/08/25  2:35 PM   Result Value Ref Range    Troponin T 6 6 - 19 ng/L   D-DIMER    Collection Time: 04/08/25  2:35 PM   Result Value Ref Range    D-Dimer <0.27 0.00 - 0.50 ug/mL (FEU)   ESTIMATED GFR    Collection Time: 04/08/25  2:35 PM   Result Value Ref Range    GFR (CKD-EPI) 96 >60 mL/min/1.73 m 2      I have independently interpreted this EKG as documented above    Rhythm strip interpretation-sinus rhythm    RADIOLOGY  I have independently interpreted the diagnostic imaging associated with this visit and am waiting the final  reading from the radiologist.   My preliminary interpretation is as follows: Chest x-ray without focal infiltrate    COURSE & MEDICAL DECISION MAKING    INITIAL ASSESSMENT, COURSE AND PLAN  Case narrative: Patient presents with chest pain.  History is not typical for acute coronary syndrome, but he has risk factors including obesity, tobacco use, high cholesterol.  He had a recent calcium score that was quite elevated.  There is no clinical suggestion of aortic dissection.  His heart rate was mildly elevated we will add a D-dimer.  Ordered cardiac panel.  Obtain chest x-ray.  Will treat with aspirin.  Give GI cocktail for possibility of GI issue.  He has a benign abdominal exam.    EKG does not show acute ischemia.  Inferior Q waves noted.    Laboratory data is unremarkable.  PE ruled out with negative D-dimer and low risk patient by Wells criteria.  Patient will need to be admitted to hospital for further evaluation.  Consult hospitalist.    Discussed case with Dr. Castellon.            Measures  CHEST PAIN:   HEART Score for Major Cardiac Events  HEART Score     History: Moderately suspicious  ECG: Non-specific repolarization disturbance  Age: 45-64  Risk Factors: 1-2 risk factors  Troponin: Less than or equal to normal limit    Heart Score: 4    Total Score   0-3 Points = Low Score, risk of MACE 0.9-1.7%.  4-6 Points = Moderate Score, risk of MACE 12-16.6%  7-10 Points = High Score, risk of MACE 50-65%        DISPOSITION AND DISCUSSIONS  I have discussed management of the patient with the following physicians and LUIS ALBERTO's:  as noted above    FINAL IMPRESSION  1.  Chest pain    This dictation was created using voice recognition software. The accuracy of the dictation is limited to the abilities of the software. I expect there may be some errors of grammar and possibly content. The nursing notes were reviewed and certain aspects of this information were incorporated into this note.    Electronically signed by: Pete TELLO  CORDELIA Young, 4/8/2025

## 2025-04-08 NOTE — ED NOTES
Medication history reviewed with pt. Med rec is complete.  Allergies reviewed, per pt    Pt reports that he started ROSUVASTATIN 20MG on 4/3/2025, pt takes in the morning     Pt reports no vitamins in the last 30 days or longer.    Patient has not had any outpatient antibiotics in the last 30 days.    Pt is not on any anticoagulants      Dispense history available in EPIC? NO

## 2025-04-08 NOTE — ASSESSMENT & PLAN NOTE
At higher risk for atelectasis/hypoxia.  I will order continuous pulse oximetry  Emphasized incentive spirometry    I will check a thyroid function test

## 2025-04-08 NOTE — ASSESSMENT & PLAN NOTE
EKG shows sinus tachycardia with a rate of 96.  There is no ST elevation.  There are T wave inversions in inferior leads.  There are ST depressions and T wave inversions in leads V4-V6.  QTc is 450.  Initial troponin is within normal limits, I will trend  I ordered Stat EKG and troponin for recurrence of chest pain.   I will place on continuous cardiac monitoring.  Plan for stress test in the morning [ordered] as long as there are no significant EKG changes or significant troponin elevation

## 2025-04-08 NOTE — H&P
"Hospital Medicine History & Physical Note    Date of Service  4/8/2025    Primary Care Physician  Doris Orozco P.A.-C.    Consultants  None      Code Status  Full Code    Chief Complaint  Chief Complaint   Patient presents with    Chest Pain     \"Low grade\"  Intermittent over the last month, became more constant today       History of Presenting Illness  Obinna Bryan is a 45 y.o. male with a past medical history of hyperlipidemia, obesity with a BMI of 37 who presented 4/8/2025 with chest pain.  Patient reports having multiple episodes of retrosternal chest pain.  Pain is described as having a pressure slight discomfort like sensation.  The pain is located in the center of the chest and does not radiate.  He denies noticing any shortness of breath, fever, cough or sputum production.       I discussed the plan of care with emergency physician, and the patient    Review of Systems  Review of Systems   Constitutional:  Negative for chills and fever.   Eyes:  Negative for discharge and redness.   Respiratory:  Negative for cough, shortness of breath and stridor.    Cardiovascular:  Positive for chest pain. Negative for leg swelling.   Gastrointestinal:  Negative for abdominal pain and vomiting.   Genitourinary:  Negative for flank pain.   Musculoskeletal:  Negative for myalgias.   Skin: Negative.    Neurological:  Negative for focal weakness.   Endo/Heme/Allergies:  Does not bruise/bleed easily.   Psychiatric/Behavioral:  The patient is not nervous/anxious.      Past Medical History   has no past medical history on file.    Surgical History   has a past surgical history that includes hand surgery; [id number]; and shoulder surgery.     Family History  family history includes Diabetes in his maternal uncle; Stroke in his maternal grandmother.      Social History   reports that he has never smoked. He has quit using smokeless tobacco.  His smokeless tobacco use included chew. He reports that he does not currently " use alcohol. He reports that he does not use drugs.    Allergies  No Known Allergies    Medications  Prior to Admission Medications   Prescriptions Last Dose Informant Patient Reported? Taking?   aspirin 81 MG EC tablet 4/8/2025 at 11:45 AM Patient Yes Yes   Sig: Take 81 mg by mouth every day.   rosuvastatin (CRESTOR) 20 MG Tab 4/8/2025 at 11:45 AM Patient No Yes   Sig: Take 1 Tablet by mouth every day.      Facility-Administered Medications: None     Physical Exam  Temp:  [36.8 °C (98.2 °F)] 36.8 °C (98.2 °F)  Pulse:  [103] 103  Resp:  [15] 15  BP: (159)/(97) 159/97  SpO2:  [96 %] 96 %  Blood Pressure: (!) 159/97   Temperature: 36.8 °C (98.2 °F)   Pulse: (!) 103   Respiration: 15   Pulse Oximetry: 96 %     Physical Exam  Constitutional:       General: He is not in acute distress.     Appearance: He is not ill-appearing or diaphoretic.   HENT:      Head: Atraumatic.      Right Ear: External ear normal.      Left Ear: External ear normal.      Nose: No congestion or rhinorrhea.      Mouth/Throat:      Mouth: Mucous membranes are moist.   Eyes:      General: No scleral icterus.        Right eye: No discharge.         Left eye: No discharge.      Pupils: Pupils are equal, round, and reactive to light.   Cardiovascular:      Rate and Rhythm: Normal rate and regular rhythm.   Pulmonary:      Effort: Pulmonary effort is normal.   Abdominal:      General: There is no distension.   Musculoskeletal:      Cervical back: Neck supple. No rigidity. No muscular tenderness.      Right lower leg: No edema.      Left lower leg: No edema.   Skin:     Coloration: Skin is not jaundiced or pale.   Neurological:      Mental Status: He is alert and oriented to person, place, and time.      Coordination: Coordination normal.   Psychiatric:         Mood and Affect: Mood normal.         Behavior: Behavior normal.       Laboratory:  Recent Labs     04/08/25  1435   WBC 8.0   RBC 5.54   HEMOGLOBIN 17.5   HEMATOCRIT 49.4   MCV 89.2   MCH 31.6    MCHC 35.4   RDW 40.2   PLATELETCT 223   MPV 8.7*     Recent Labs     04/08/25  1435   SODIUM 138   POTASSIUM 3.5*   CHLORIDE 101   CO2 24   GLUCOSE 143*   BUN 10   CREATININE 0.98   CALCIUM 9.4     Recent Labs     04/08/25  1435   ALTSGPT 34   ASTSGOT 31   ALKPHOSPHAT 72   TBILIRUBIN 0.5   GLUCOSE 143*         Recent Labs     04/08/25  1435   NTPROBNP <36         Recent Labs     04/08/25  1435   TROPONINT 6     Imaging:  DX-CHEST-PORTABLE (1 VIEW)   Final Result      No acute cardiac or pulmonary abnormalities are identified.      EC-ECHOCARDIOGRAM COMPLETE W/O CONT    (Results Pending)     I personally reviewed patient EKG shows sinus tachycardia with a rate of 96.  There is no ST elevation.  There are T wave inversions in inferior leads.  There are ST depressions and T wave inversions in leads V4-V6.  QTc is 450.    Assessment/Plan:  Justification for Admission Status  I anticipate this patient is appropriate for observation status at this time because patient has chest pain.    Patient will need a Telemetry bed on MEDICAL service /[place] for [facility] with remote cardiac monitoring.      * Chest pain- (present on admission)  Assessment & Plan  EKG shows sinus tachycardia with a rate of 96.  There is no ST elevation.  There are T wave inversions in inferior leads.  There are ST depressions and T wave inversions in leads V4-V6.  QTc is 450.  Initial troponin is within normal limits, I will trend  I ordered Stat EKG and troponin for recurrence of chest pain.   I will place on continuous cardiac monitoring.  Plan for stress test in the morning [ordered] as long as there are no significant EKG changes or significant troponin elevation     Elevated blood pressure reading- (present on admission)  Assessment & Plan  No known history of primary hypertension  Probably has undiagnosed hypertension  Vital signs per policy   I will start as needed labetalol for extreme hypertension  Consider starting scheduled antihypertensive  medications according to blood pressure trend     Hypokalemia- (present on admission)  Assessment & Plan  Replacing, checking magnesium    Continue to monitor replace as needed     Class 2 obesity without serious comorbidity with body mass index (BMI) of 37.0 to 37.9 in adult- (present on admission)  Assessment & Plan  At higher risk for atelectasis/hypoxia.  I will order continuous pulse oximetry  Emphasized incentive spirometry    I will check a thyroid function test    Abnormal EKG- (present on admission)  Assessment & Plan  EKG shows sinus tachycardia with a rate of 96.  There is no ST elevation.  There are T wave inversions in inferior leads.  There are ST depressions and T wave inversions in leads V4-V6.  QTc is 450.    I will place on continuous cardiac monitoring  I will check and trend troponins  I will check echocardiography      Mixed hyperlipidemia- (present on admission)  Assessment & Plan  Cardiac diet.  Resume rosuvastatin      VTE prophylaxis: SCDs/TEDs and enoxaparin ppx

## 2025-04-09 ENCOUNTER — APPOINTMENT (OUTPATIENT)
Dept: RADIOLOGY | Facility: MEDICAL CENTER | Age: 45
End: 2025-04-09
Attending: HOSPITALIST
Payer: COMMERCIAL

## 2025-04-09 ENCOUNTER — APPOINTMENT (OUTPATIENT)
Dept: CARDIOLOGY | Facility: MEDICAL CENTER | Age: 45
End: 2025-04-09
Attending: HOSPITALIST
Payer: COMMERCIAL

## 2025-04-09 VITALS
BODY MASS INDEX: 37.44 KG/M2 | WEIGHT: 267.42 LBS | HEART RATE: 66 BPM | OXYGEN SATURATION: 95 % | DIASTOLIC BLOOD PRESSURE: 79 MMHG | TEMPERATURE: 97.7 F | HEIGHT: 71 IN | RESPIRATION RATE: 17 BRPM | SYSTOLIC BLOOD PRESSURE: 139 MMHG

## 2025-04-09 PROBLEM — E87.6 HYPOKALEMIA: Status: RESOLVED | Noted: 2025-04-08 | Resolved: 2025-04-09

## 2025-04-09 LAB
ALBUMIN SERPL BCP-MCNC: 3.8 G/DL (ref 3.2–4.9)
ALBUMIN/GLOB SERPL: 1.3 G/DL
ALP SERPL-CCNC: 61 U/L (ref 30–99)
ALT SERPL-CCNC: 29 U/L (ref 2–50)
ANION GAP SERPL CALC-SCNC: 9 MMOL/L (ref 7–16)
AST SERPL-CCNC: 31 U/L (ref 12–45)
BILIRUB SERPL-MCNC: 0.4 MG/DL (ref 0.1–1.5)
BUN SERPL-MCNC: 14 MG/DL (ref 8–22)
CALCIUM ALBUM COR SERPL-MCNC: 9.6 MG/DL (ref 8.5–10.5)
CALCIUM SERPL-MCNC: 9.4 MG/DL (ref 8.5–10.5)
CHLORIDE SERPL-SCNC: 104 MMOL/L (ref 96–112)
CHOLEST SERPL-MCNC: 97 MG/DL (ref 100–199)
CO2 SERPL-SCNC: 24 MMOL/L (ref 20–33)
CREAT SERPL-MCNC: 0.84 MG/DL (ref 0.5–1.4)
ERYTHROCYTE [DISTWIDTH] IN BLOOD BY AUTOMATED COUNT: 41.1 FL (ref 35.9–50)
GFR SERPLBLD CREATININE-BSD FMLA CKD-EPI: 109 ML/MIN/1.73 M 2
GLOBULIN SER CALC-MCNC: 2.9 G/DL (ref 1.9–3.5)
GLUCOSE SERPL-MCNC: 100 MG/DL (ref 65–99)
HCT VFR BLD AUTO: 47.8 % (ref 42–52)
HDLC SERPL-MCNC: 31 MG/DL
HGB BLD-MCNC: 16.5 G/DL (ref 14–18)
LDLC SERPL CALC-MCNC: 47 MG/DL
MAGNESIUM SERPL-MCNC: 2.1 MG/DL (ref 1.5–2.5)
MCH RBC QN AUTO: 31.4 PG (ref 27–33)
MCHC RBC AUTO-ENTMCNC: 34.5 G/DL (ref 32.3–36.5)
MCV RBC AUTO: 90.9 FL (ref 81.4–97.8)
PLATELET # BLD AUTO: 173 K/UL (ref 164–446)
PMV BLD AUTO: 9 FL (ref 9–12.9)
POTASSIUM SERPL-SCNC: 4.1 MMOL/L (ref 3.6–5.5)
PROT SERPL-MCNC: 6.7 G/DL (ref 6–8.2)
RBC # BLD AUTO: 5.26 M/UL (ref 4.7–6.1)
SODIUM SERPL-SCNC: 137 MMOL/L (ref 135–145)
TRIGL SERPL-MCNC: 93 MG/DL (ref 0–149)
WBC # BLD AUTO: 8.2 K/UL (ref 4.8–10.8)

## 2025-04-09 PROCEDURE — G0378 HOSPITAL OBSERVATION PER HR: HCPCS

## 2025-04-09 PROCEDURE — 85027 COMPLETE CBC AUTOMATED: CPT

## 2025-04-09 PROCEDURE — 700111 HCHG RX REV CODE 636 W/ 250 OVERRIDE (IP): Performed by: HOSPITALIST

## 2025-04-09 PROCEDURE — A9502 TC99M TETROFOSMIN: HCPCS

## 2025-04-09 PROCEDURE — 80061 LIPID PANEL: CPT

## 2025-04-09 PROCEDURE — 80053 COMPREHEN METABOLIC PANEL: CPT

## 2025-04-09 PROCEDURE — 99239 HOSP IP/OBS DSCHRG MGMT >30: CPT | Performed by: HOSPITALIST

## 2025-04-09 PROCEDURE — 93018 CV STRESS TEST I&R ONLY: CPT | Performed by: INTERNAL MEDICINE

## 2025-04-09 PROCEDURE — 78452 HT MUSCLE IMAGE SPECT MULT: CPT | Mod: 26 | Performed by: INTERNAL MEDICINE

## 2025-04-09 PROCEDURE — 36415 COLL VENOUS BLD VENIPUNCTURE: CPT

## 2025-04-09 PROCEDURE — 83735 ASSAY OF MAGNESIUM: CPT

## 2025-04-09 RX ORDER — REGADENOSON 0.08 MG/ML
0.4 INJECTION, SOLUTION INTRAVENOUS ONCE
Status: COMPLETED | OUTPATIENT
Start: 2025-04-09 | End: 2025-04-09

## 2025-04-09 RX ORDER — AMINOPHYLLINE 25 MG/ML
100 INJECTION, SOLUTION INTRAVENOUS
Status: DISCONTINUED | OUTPATIENT
Start: 2025-04-09 | End: 2025-04-09 | Stop reason: HOSPADM

## 2025-04-09 RX ADMIN — REGADENOSON 0.4 MG: 0.08 INJECTION, SOLUTION INTRAVENOUS at 09:13

## 2025-04-09 ASSESSMENT — PAIN DESCRIPTION - PAIN TYPE: TYPE: ACUTE PAIN

## 2025-04-09 NOTE — CARE PLAN
The patient is Stable - Low risk of patient condition declining or worsening    Shift Goals  Clinical Goals: monitor labs, pain management, increase activity with comfort    Progress made toward(s) clinical / shift goals:  in progress    Patient is not progressing towards the following goals:

## 2025-04-09 NOTE — PROGRESS NOTES
Telemetry Shift Summary     Rhythm: SR  Rate: 60s-70s  Measurements: 0.18/0.10/0.35  Ectopy (reported by Monitor Tech): n/a     Normal Values  Rhythm: Sinus  HR:   Measurements: 0.12-0.20/0.06-0.10/0.30-0.52

## 2025-04-09 NOTE — PROGRESS NOTES
BSSR received from day shift RN. Patient laying in bed in no apparent distress c/o slight burning in chest. . A&O X4. Plan of care discussed with patient. Bed in low position with bed brakes applied and call light in reach. Patient states no needs at this time.

## 2025-04-09 NOTE — DISCHARGE SUMMARY
"Discharge Summary    CHIEF COMPLAINT ON ADMISSION  Chief Complaint   Patient presents with    Chest Pain     \"Low grade\"  Intermittent over the last month, became more constant today         Reason for Admission  Chest Pain     Admission Date  4/8/2025    CODE STATUS  Full Code    HPI & HOSPITAL COURSE  This is a 45 y.o. male here with chest pain     Obinna Bryan has past medical history that includes elevated coronary calcium score hyperlipidemia and obesity.  The patient presented the emergency room on 4/8/2025 with chest pain.  Patient was noted to be hypertensive in the emergency room.  EKG revealed nonspecific abnormalities.  Troponin below the limit of detection.  The patient was hospitalized for cardiac monitoring and stress test.    He has completed stress test which is normal with no evidence of ischemia    We did review his symptoms.  The patient has located chest pain which can last for quite some time.  It is not necessarily associated with exertion.  The patient has had multiple stress test through his employer which have always been negative.  He is relatively active as a  and does not have any exercise intolerance.  It seems very unlikely that the patient's chest pain is cardiac related given that he had 6 hours of chest pain prior to presentation and his troponin levels have remained entirely normal.  His stress test is reassuring.  He did speak with hospital schedulers, they will expedite a cardiology appointment so that he can discuss his elevated coronary calcium score.    In the meantime have recommended the patient do a trial of daily omeprazole.  He does report occasional heartburn symptoms    Therefore, he is discharged in good and stable condition to home with close outpatient follow-up.    Discharge Date  4/9/2025    FOLLOW UP ITEMS POST DISCHARGE  Follow-up with primary care, follow-up with cardiology    DISCHARGE DIAGNOSES  Principal Problem:    Chest pain (POA: Yes)  Active " Problems:    Abnormal EKG (POA: Yes)    Class 2 obesity without serious comorbidity with body mass index (BMI) of 37.0 to 37.9 in adult (POA: Yes)    Mixed hyperlipidemia (POA: Yes)    Elevated blood pressure reading (POA: Yes)  Resolved Problems:    Hypokalemia (POA: Yes)      FOLLOW UP  Future Appointments   Date Time Provider Department Center   4/24/2025  7:20 AM CORDELIA P[address]co [address]   4/29/2025  3:45 PM CHANNING Ibarra [facility] None   5/13/2025  8:40 AM MELISSA Renner-TEX Orozco P.A.-C.  [address]  [facility] [address]  [phone]    Call        MEDICATIONS ON DISCHARGE     Medication List        CONTINUE taking these medications        Instructions   aspirin 81 MG EC tablet   Take 81 mg by mouth every day.  Dose: 81 mg     rosuvastatin 20 MG Tabs  Commonly known as: Crestor   Take 1 Tablet by mouth every day.  Dose: 20 mg              Allergies  No Known Allergies    DIET  Orders Placed This Encounter   Procedures    Diet Order Diet: Regular     Standing Status:   Standing     Number of Occurrences:   1     Diet::   Regular [1]       ACTIVITY  As tolerated.  Weight bearing as tolerated    CONSULTATIONS  None    PROCEDURES  Nuclear medicine stress test 4/9/2025:   NUCLEAR IMAGING INTERPRETATION   No evidence of significant jeopardized viable myocardium or prior myocardial    infarction.   Normal left ventricular size, ejection fraction, and wall motion.   ECG INTERPRETATION   No ischemic changes with stress compared to baseline ECG    LABORATORY  Lab Results   Component Value Date    SODIUM 137 04/09/2025    POTASSIUM 4.1 04/09/2025    CHLORIDE 104 04/09/2025    CO2 24 04/09/2025    GLUCOSE 100 (H) 04/09/2025    BUN 14 04/09/2025    CREATININE 0.84 04/09/2025        Lab Results   Component Value Date    WBC 8.2 04/09/2025    HEMOGLOBIN 16.5 04/09/2025    HEMATOCRIT 47.8 04/09/2025    PLATELETCT 173 04/09/2025        Total time of the discharge  process exceeds 40 minutes.

## 2025-04-09 NOTE — PROGRESS NOTES
Received report from night shift RN. Assumed pt care 0700  Pt is A&Ox4, resting comfortably in bed. Plan of care reviewed for activities and goals this shift. Medication eduction provided.  Pt verbalizes understanding of plan of care for this shift.  Pt denies pain and/or nausea at this moment.  Patients needs attended well. Fall precautions in place. Bed at lowest position. Call light and personal belongings within reach.   Hourly rounding in progress.  Discharge instructions reviewed. Medication education, follow up and home care education provided. Pt verbalizes understanding of instructions.   Level of comfort increased prior to discharge. VSS. PIV removed. Belongings gathered to take home.

## 2025-04-09 NOTE — PROGRESS NOTES
4 Eyes Skin Assessment Completed by Claudette RN and Jef RN.    Head WDL  Ears WDL  Nose WDL  Mouth WDL  Neck WDL  Breast/Chest WDL  Shoulder Blades WDL  Spine WDL  (R) Arm/Elbow/Hand WDL  (L) Arm/Elbow/Hand WDL  Abdomen WDL  Groin WDL  Scrotum/Coccyx/Buttocks WDL  (R) Leg WDL  (L) Leg WDL  (R) Heel/Foot/Toe WDL  (L) Heel/Foot/Toe WDL          Devices In Places Pulse Ox      Interventions In Place Pillows    Possible Skin Injury No    Pictures Uploaded Into Epic N/A  Wound Consult Placed N/A  RN Wound Prevention Protocol Ordered No

## 2025-04-09 NOTE — HOSPITAL COURSE
Obinna Bryan has past medical history that includes hyperlipidemia and obesity.  The patient presented the emergency room on 4/8/2025 with chest pain.  Patient was noted to be hypertensive in the emergency room.  EKG revealed nonspecific abnormalities.  Troponin below the limit of detection.  The patient was hospitalized for cardiac monitoring and stress test

## 2025-04-09 NOTE — DISCHARGE INSTRUCTIONS
Diet    Heart Healthy Diet    A Heart-Healthy diet includes increasing healthy fats and limiting unhealthy fats.  Focus on eating a balance of foods, including fruits and vegetables, low-fat or nonfat dairy, lean protein, nuts and legumes, whole grains, and heart-healthy oils and fats.  Monitor your salt (sodium) intake, especially if you have high blood pressure.  Lose weight if you are overweight. Losing just 5-10% of your body weight can help your overall health and prevent diseases such as diabetes and heart disease.

## 2025-04-09 NOTE — CARE PLAN
The patient is Watcher - Medium risk of patient condition declining or worsening    Shift Goals  Clinical Goals: Monitor chest pain, monitor lab results  Patient Goals: Sleep/rest overnight    Progress made toward(s) clinical / shift goals:  Patient has denied chest pain overnight, labs are WNL    Patient is not progressing towards the following goals:

## 2025-04-11 ENCOUNTER — APPOINTMENT (OUTPATIENT)
Dept: RADIOLOGY | Facility: MEDICAL CENTER | Age: 45
End: 2025-04-11
Attending: EMERGENCY MEDICINE
Payer: COMMERCIAL

## 2025-04-11 ENCOUNTER — HOSPITAL ENCOUNTER (EMERGENCY)
Facility: MEDICAL CENTER | Age: 45
End: 2025-04-11
Attending: EMERGENCY MEDICINE
Payer: COMMERCIAL

## 2025-04-11 VITALS
OXYGEN SATURATION: 92 % | DIASTOLIC BLOOD PRESSURE: 78 MMHG | SYSTOLIC BLOOD PRESSURE: 130 MMHG | TEMPERATURE: 98 F | HEART RATE: 81 BPM | BODY MASS INDEX: 36.4 KG/M2 | RESPIRATION RATE: 18 BRPM | WEIGHT: 260 LBS | HEIGHT: 71 IN

## 2025-04-11 DIAGNOSIS — R07.9 CHEST PAIN, UNSPECIFIED TYPE: ICD-10-CM

## 2025-04-11 LAB
ANION GAP SERPL CALC-SCNC: 8 MMOL/L (ref 7–16)
BASOPHILS # BLD AUTO: 0.9 % (ref 0–1.8)
BASOPHILS # BLD: 0.06 K/UL (ref 0–0.12)
BUN SERPL-MCNC: 15 MG/DL (ref 8–22)
CALCIUM SERPL-MCNC: 9.2 MG/DL (ref 8.5–10.5)
CHLORIDE SERPL-SCNC: 107 MMOL/L (ref 96–112)
CO2 SERPL-SCNC: 23 MMOL/L (ref 20–33)
CREAT SERPL-MCNC: 0.91 MG/DL (ref 0.5–1.4)
EKG IMPRESSION: NORMAL
EOSINOPHIL # BLD AUTO: 0.22 K/UL (ref 0–0.51)
EOSINOPHIL NFR BLD: 3.2 % (ref 0–6.9)
ERYTHROCYTE [DISTWIDTH] IN BLOOD BY AUTOMATED COUNT: 40.8 FL (ref 35.9–50)
GFR SERPLBLD CREATININE-BSD FMLA CKD-EPI: 105 ML/MIN/1.73 M 2
GLUCOSE SERPL-MCNC: 118 MG/DL (ref 65–99)
HCT VFR BLD AUTO: 48.3 % (ref 42–52)
HGB BLD-MCNC: 17 G/DL (ref 14–18)
IMM GRANULOCYTES # BLD AUTO: 0.02 K/UL (ref 0–0.11)
IMM GRANULOCYTES NFR BLD AUTO: 0.3 % (ref 0–0.9)
LYMPHOCYTES # BLD AUTO: 2.41 K/UL (ref 1–4.8)
LYMPHOCYTES NFR BLD: 35.1 % (ref 22–41)
MCH RBC QN AUTO: 31.1 PG (ref 27–33)
MCHC RBC AUTO-ENTMCNC: 35.2 G/DL (ref 32.3–36.5)
MCV RBC AUTO: 88.3 FL (ref 81.4–97.8)
MONOCYTES # BLD AUTO: 0.63 K/UL (ref 0–0.85)
MONOCYTES NFR BLD AUTO: 9.2 % (ref 0–13.4)
NEUTROPHILS # BLD AUTO: 3.53 K/UL (ref 1.82–7.42)
NEUTROPHILS NFR BLD: 51.3 % (ref 44–72)
NRBC # BLD AUTO: 0 K/UL
NRBC BLD-RTO: 0 /100 WBC (ref 0–0.2)
PLATELET # BLD AUTO: 199 K/UL (ref 164–446)
PMV BLD AUTO: 8.3 FL (ref 9–12.9)
POTASSIUM SERPL-SCNC: 4.1 MMOL/L (ref 3.6–5.5)
RBC # BLD AUTO: 5.47 M/UL (ref 4.7–6.1)
SODIUM SERPL-SCNC: 138 MMOL/L (ref 135–145)
TROPONIN T SERPL-MCNC: 8 NG/L (ref 6–19)
WBC # BLD AUTO: 6.9 K/UL (ref 4.8–10.8)

## 2025-04-11 PROCEDURE — 84484 ASSAY OF TROPONIN QUANT: CPT

## 2025-04-11 PROCEDURE — 93005 ELECTROCARDIOGRAM TRACING: CPT | Mod: TC | Performed by: EMERGENCY MEDICINE

## 2025-04-11 PROCEDURE — 71045 X-RAY EXAM CHEST 1 VIEW: CPT

## 2025-04-11 PROCEDURE — 80048 BASIC METABOLIC PNL TOTAL CA: CPT

## 2025-04-11 PROCEDURE — 85025 COMPLETE CBC W/AUTO DIFF WBC: CPT

## 2025-04-11 PROCEDURE — 93005 ELECTROCARDIOGRAM TRACING: CPT | Mod: TC

## 2025-04-11 PROCEDURE — 36415 COLL VENOUS BLD VENIPUNCTURE: CPT

## 2025-04-11 PROCEDURE — 99283 EMERGENCY DEPT VISIT LOW MDM: CPT

## 2025-04-11 RX ORDER — NAPROXEN 500 MG/1
500 TABLET ORAL 2 TIMES DAILY WITH MEALS
Qty: 10 TABLET | Refills: 0 | Status: SHIPPED | OUTPATIENT
Start: 2025-04-11 | End: 2025-04-25

## 2025-04-11 ASSESSMENT — HEART SCORE
AGE: 45-64
HEART SCORE: 2
HISTORY: SLIGHTLY SUSPICIOUS
RISK FACTORS: 1-2 RISK FACTORS
ECG: NORMAL
TROPONIN: LESS THAN OR EQUAL TO NORMAL LIMIT

## 2025-04-11 ASSESSMENT — FIBROSIS 4 INDEX: FIB4 SCORE: 1.53

## 2025-04-11 NOTE — LETTER
"  EMPLOYEE’S CLAIM FOR COMPENSATION/ REPORT OF INITIAL TREATMENT  FORM C-4  PLEASE TYPE OR PRINT    EMPLOYEE’S CLAIM - PROVIDE ALL INFORMATION REQUESTED   First Name                    TONO Yeboah                  Last Name  Irvin Birthdate                    1980                Sex  [x]Male Claim Number (Insurer’s Use Only)     Mailing Address  [address] Age  45 y.o. Height  1.803 m (5' 11\") Weight  118 kg (260 lb) Social Security Number  [id number]   [facility]  [address] Telephone  [phone] (home)    Email  [email or web address]    Primary Language Spoken  English    INSURER  *** THIRD-PARTY   [facility]   Employee's Occupation (Job Title) When Injury or Occupational Disease Occurred      Employer's Name/Company Name  [employer]  Telephone  [phone]    Office Mail Address (Number and Street)  [address] [place] [address]     Date of Injury (if applicable) 4/11/2025               Hours Injury (if applicable)  2:00 AM am    pm Date Employer Notified  4/11/2025 Last Day of Work after Injury or Occupational Disease  4/11/2025 Supervisor to Whom Injury Reported  Forrest Naidu   Address or Location of Accident (if applicable)  Home [2]   What were you doing at the time of accident? (if applicable)  Sitting    How did this injury or occupational disease occur? (Be specific and answer in detail. Use additional sheet if necessary)  Responded to a emergency call at 0200 and felt discomfort in the chest.   If you believe that you have an occupational disease, when did you first have knowledge of the disability and its relationship to your employment?   Witnesses to the Accident (if applicable)  N/A      Nature of Injury or Occupational Disease  Workers' Compensation  Part(s) of Body Injured or Affected  Chest N/A N/A    I CERTIFY THAT THE ABOVE IS TRUE AND CORRECT TO T HE BEST OF MY " KNOWLEDGE AND THAT I HAVE PROVIDED THIS INFORMATION IN ORDER TO OBTAIN THE BENEFITS OF [place], OR [place]).  I HEREBY AUTHORIZE ANY PHYSICIAN, CHIROPRACTOR, SURGEON, PRACTITIONER OR ANY OTHER PERSON, ANY HOSPITAL, INCLUDING [facility] OR [facility], ANY  MEDICAL SERVICE ORGANIZATION, ANY INSURANCE COMPANY, OR OTHER INSTITUTION OR ORGANIZATION TO RELEASE TO EACH OTHER, ANY MEDICAL OR OTHER INFORMATION, INCLUDING BENEFITS PAID OR PAYABLE, PERTINENT TO THIS INJURY OR DISEASE, EXCEPT INFORMATION RELATIVE TO DIAGNOSIS, TREATMENT AND/OR COUNSELING FOR AIDS, PSYCHOLOGICAL CONDITIONS, ALCOHOL OR CONTROLLED SUBSTANCES, FOR WHICH I MUST GIVE SPECIFIC AUTHORIZATION.  A PHOTOSTAT OF THIS AUTHORIZATION SHALL BE VALID AS THE ORIGINAL.     Date   Place Employee’s Original or  *Electronic Signature   THIS REPORT MUST BE COMPLETED AND MAILED WITHIN 3 WORKING DAYS OF TREATMENT   Place  [facility], EMERGENCY DEPT    Name of Facility      Date 4/11/2025 Diagnosis and Description of Injury or Occupational Disease  (R07.9) Chest pain, unspecified type  The encounter diagnosis was Chest pain, unspecified type. Is there evidence that the injured employee was under the influence of alcohol and/or another controlled substance at the time of accident?  []No  [] Yes (if yes, please explain)   Hour       Treatment:      Have you advised the patient to remain off work five days or more?      [] Yes  If yes, indicate dates: From_ _                                                      To __ _  [] No   If no, is the injured employee capable of: [] full duty     [] modified duty      If modified duty, specify any limitations / restrictions:__________________  ___ ___________________________     X-Ray Findings:      From information given by the employee, together with medical evidence, can you directly connect this  injury or occupational disease as job incurred?  []Yes   [] No      Is additional medical care by a physician indicated? []Yes [] No       Do you know of any previous injury or disease contributing to this condition or occupational disease? []Yes [] No (Explain if yes)                              Date  4/11/2025 Print Health Care Provider’s Name  No name on file I certify that the employer’s copy of  this form was delivered to the employer on:   Address    INSURER'S USE ONLY                       City    State    Zip    Provider’s Tax ID Number      Telephone  Dept: [phone]    Health Care Provider’s Original or Electronic Signature      e-DEMARCO Rose M.D.    Degree (MD,DO, DC,PA-C,APRN)  {Provider Degrees:34643}  Choose (if applicable)      ORIGINAL - TREATING HEALTHCARE PROVIDER PAGE 2 - INSURER/TPA PAGE 3 - EMPLOYER PAGE 4 - EMPLOYEE             Form C-4 (rev.02/25)

## 2025-04-11 NOTE — ED PROVIDER NOTES
"ER Provider Note    Scribed for Dr. Fransico Currie M.D. by Geovany Keita. 4/11/2025  7:59 AM    Primary Care Provider: Doris Orozco P.A.-C.    CHIEF COMPLAINT  Chief Complaint   Patient presents with    Chest Pain       EXTERNAL RECORDS REVIEWED  Patient was admitted for chest pain on the 8th of this month. Subsequent negative stress test.     HPI/ROS    OUTSIDE HISTORIAN(S):  Friends present at bedside.     Obinna Bryan is a 45 y.o. male who presents to the ED for intermittent chest pain. Patient says this pain is similar to previous episodes. Intermittent left sided \"pinchy, burning, aching\" chest pain. Patient describes the pain as a \"3,4,5 \" out of ten on the pain scale when it is present. No alleviating or exacerbating factors noted. Patient is followed by cardiology. Patient recently began taking hypertension medications. Patient has been treating with aspirin, tylenol and ibuprofen without relief. Patient denies any recent trauma. Patient does not drink alcohol.     PAST MEDICAL HISTORY  History reviewed. No pertinent past medical history.    SURGICAL HISTORY  Past Surgical History:   Procedure Laterality Date    HAND SURGERY      rt hand metal plate broken bone     [id number]      lt knee legament     SHOULDER SURGERY      rt shoulder        FAMILY HISTORY  Family History   Problem Relation Age of Onset    Diabetes Maternal Uncle     Stroke Maternal Grandmother     Cancer Neg Hx     Ovarian Cancer Neg Hx     Tubal Cancer Neg Hx     Peritoneal Cancer Neg Hx     Colorectal Cancer Neg Hx     Breast Cancer Neg Hx     Heart Disease Neg Hx        SOCIAL HISTORY   reports that he has never smoked. He has quit using smokeless tobacco.  His smokeless tobacco use included chew. He reports that he does not currently use alcohol. He reports that he does not use drugs.    CURRENT MEDICATIONS  Discharge Medication List as of 4/11/2025  9:33 AM        CONTINUE these medications which have NOT CHANGED    Details " "  aspirin 81 MG EC tablet Take 81 mg by mouth every day., Historical Med      rosuvastatin (CRESTOR) 20 MG Tab Take 1 Tablet by mouth every day., Disp-90 Tablet, R-3, Normal             ALLERGIES  Patient has no known allergies.    PHYSICAL EXAM  BP (!) 140/81   Pulse 72   Resp 15   Ht 1.803 m (5' 11\")   Wt 118 kg (260 lb)   SpO2 95%   BMI 36.26 kg/m²   Constitutional: Alert in no apparent distress.  HENT: No signs of trauma, Bilateral external ears normal, Nose normal.   Eyes: Pupils are equal and reactive, Conjunctiva normal, Non-icteric.   Neck: Normal range of motion, No tenderness, Supple,   Cardiovascular: Regular rate and rhythm, no murmurs.   Thorax & Lungs: Normal breath sounds, No respiratory distress, No wheezing, No chest tenderness.   Abdomen: Bowel sounds normal, Soft, No tenderness  Skin: Warm, Dry, No erythema, No rash.   Back: No bony tenderness, No CVA tenderness.   Extremities: No edema, No tenderness, No cyanosis  Neurologic: Moves all extremities spontaneously, no focal deficits  Psychiatric: Affect normal     DIAGNOSTIC STUDIES & PROCEDURES    Labs:   Labs Reviewed   CBC WITH DIFFERENTIAL - Abnormal; Notable for the following components:       Result Value    MPV 8.3 (*)     All other components within normal limits   BASIC METABOLIC PANEL - Abnormal; Notable for the following components:    Glucose 118 (*)     All other components within normal limits   TROPONIN   ESTIMATED GFR   All labs reviewed by me.    Results for orders placed or performed during the hospital encounter of 25   EKG (NOW)   Result Value Ref Range    Report       [facility] Emergency Dept.    Test Date:  2025  Pt Name:    SHYANNE SILVERIO              Department: ER  MRN:        [id number]                      Room:       RD 11  Gender:     Male                         Technician: [id number]  :        1980                   Requested By:ER TRIAGE PROTOCOL  Order #:    [id number]          "           Reading MD:    Measurements  Intervals                                Axis  Rate:       72                           P:          74  WI:         152                          QRS:        99  QRSD:       105                          T:          72  QT:         388  QTc:        425    Interpretive Statements  Sinus rhythm  Borderline right axis deviation  Compared to ECG 04/08/2025 14:11:48  Myocardial infarct finding no longer present     EKG Interpretation:  Sinus rhythm at 72 bpm, no significant ST elevation, depression, or T wave inversion  Interpreted by me      Radiology:   The attending Emergency Physician has independently interpreted the diagnostic imaging associated with this visit and is awaiting the final reading from the radiologist, which will be displayed below.    Preliminary interpretation is a follows: No pneumonia  Radiologist interpretation:     DX-CHEST-PORTABLE (1 VIEW)   Final Result      No acute cardiopulmonary disease evident.           COURSE & MEDICAL DECISION MAKING    ED Observation Status? No; Patient does not meet criteria for ED Observation.       CHEST PAIN:   HEART Score for Major Cardiac Events  HEART Score     History: Slightly suspicious  ECG: Normal  Age: 45-64  Risk Factors: 1-2 risk factors  Troponin: Less than or equal to normal limit    Heart Score: 2    Total Score   0-3 Points = Low Score, risk of MACE 0.9-1.7%.  4-6 Points = Moderate Score, risk of MACE 12-16.6%  7-10 Points = High Score, risk of MACE 50-65%       INITIAL ASSESSMENT AND PLAN  Care Narrative:       7:59 AM - Patient seen and evaluated at bedside. Discussed plan of care, including plan to further evaluate. Patient agrees to plan of care. Patient will be treated as ordered for his symptoms. Ordered labs, imaging, and EKG to evaluate.    9:10 AM - I reevaluated the patient at bedside. I discussed the patient's diagnostic study results. I discussed plan for discharge and follow up as outlined below.  The patient is stable for discharge at this time and will return for any new or worsening symptoms. Patient verbalizes understanding and support with my plan for discharge.        ADDITIONAL PROBLEM LIST AND DISPOSITION  Patient with chest pain.  Ultimately low heart score.  Could be musculoskeletal.  Will send the patient home with naproxen.  Will try this as a adjunct to potentially musculoskeletal pain.  Does not seem the Pepcid is helping.  Over the patient follow-up with cardiology.               DISPOSITION AND DISCUSSIONS  I have discussed management of the patient with the following physicians and LUIS ALBERTO's: None noted     Discussion of management with other QHP or appropriate source(s): None     Escalation of care considered, and ultimately not performed: acute inpatient care management, however at this time, the patient is most appropriate for outpatient management.    Barriers to care at this time, including but not limited to:  nnoe .     Decision tools and prescription drugs considered including, but not limited to:  Low heart score .    The patient will return for new or worsening symptoms and is stable at the time of discharge.    DISPOSITION:  Patient will be discharged home in stable condition.    FOLLOW UP:  Doris Orozco, P.A.-C.  [address]  [facility] [address]  [facility] [address]  [phone]    In 2 days        OUTPATIENT MEDICATIONS:  Discharge Medication List as of 4/11/2025  9:33 AM           FINAL IMPRESSION   1. Chest pain, unspecified type         I, Geovany Keita (Dorothy), am scribing for, and in the presence of, Fransico Currie M.D..    Electronically signed by: Geovany Keita (Dorothy), 4/11/2025    IFransico M.D. personally performed the services described in this documentation, as scribed by Geovany Keita in my presence, and it is both accurate and complete.    The note accurately reflects work and decisions made by me.  Fransico Currie M.D.  4/11/2025  11:45 AM

## 2025-04-11 NOTE — LETTER
"  EMPLOYEE’S CLAIM FOR COMPENSATION/ REPORT OF INITIAL TREATMENT  FORM C-4  PLEASE TYPE OR PRINT    EMPLOYEE’S CLAIM - PROVIDE ALL INFORMATION REQUESTED   First Name                    TONO Yeboah                  Last Name  Irvin Birthdate                    1980                Sex  [x]Male Claim Number (Insurer’s Use Only)     Mailing Address  [address] Age  45 y.o. Height  1.803 m (5' 11\") Weight  118 kg (260 lb) Social Security Number     [facility]  [address] Telephone  [phone] (home)    Email  [email or web address]    Primary Language Spoken  English    INSURER   THIRD-PARTY   Edgar    Employee's Occupation (Job Title) When Injury or Occupational Disease Occurred      Employer's Name/Company Name  [employer]  Telephone  [phone]    Office Mail Address (Number and Street)  [address] [place] [address]     Date of Injury (if applicable) 4/11/2025               Hours Injury (if applicable)  2:00 AM am    pm Date Employer Notified  4/11/2025 Last Day of Work after Injury or Occupational Disease  4/11/2025 Supervisor to Whom Injury Reported  Forrest Naidu   Address or Location of Accident (if applicable)  Home [2]   What were you doing at the time of accident? (if applicable)  Sitting    How did this injury or occupational disease occur? (Be specific and answer in detail. Use additional sheet if necessary)  Responded to a emergency call at 0200 and felt discomfort in the chest.   If you believe that you have an occupational disease, when did you first have knowledge of the disability and its relationship to your employment?  N/A   Witnesses to the Accident (if applicable)  N/A      Nature of Injury or Occupational Disease  Workers' Compensation  Part(s) of Body Injured or Affected  Chest N/A N/A    I CERTIFY THAT THE ABOVE IS TRUE AND CORRECT TO T HE BEST OF MY KNOWLEDGE " AND THAT I HAVE PROVIDED THIS INFORMATION IN ORDER TO OBTAIN THE BENEFITS OF [place]CC[place], OR [place]).  I HEREBY AUTHORIZE ANY PHYSICIAN, CHIROPRACTOR, SURGEON, PRACTITIONER OR ANY OTHER PERSON, ANY HOSPITAL, INCLUDING [facility] OR [facility], ANY  MEDICAL SERVICE ORGANIZATION, ANY INSURANCE COMPANY, OR OTHER INSTITUTION OR ORGANIZATION TO RELEASE TO EACH OTHER, ANY MEDICAL OR OTHER INFORMATION, INCLUDING BENEFITS PAID OR PAYABLE, PERTINENT TO THIS INJURY OR DISEASE, EXCEPT INFORMATION RELATIVE TO DIAGNOSIS, TREATMENT AND/OR COUNSELING FOR AIDS, PSYCHOLOGICAL CONDITIONS, ALCOHOL OR CONTROLLED SUBSTANCES, FOR WHICH I MUST GIVE SPECIFIC AUTHORIZATION.  A PHOTOSTAT OF THIS AUTHORIZATION SHALL BE VALID AS THE ORIGINAL.     Date 04/11/25   Place [facility] Employee’s Original or  *Electronic Signature   THIS REPORT MUST BE COMPLETED AND MAILED WITHIN 3 WORKING DAYS OF TREATMENT   Place  [facility], EMERGENCY DEPT    Name of Facility   [facility]    Date 4/11/2025 Diagnosis and Description of Injury or Occupational Disease  (R07.9) Chest pain, unspecified type  The encounter diagnosis was Chest pain, unspecified type. Is there evidence that the injured employee was under the influence of alcohol and/or another controlled substance at the time of accident?  []No  [] Yes (if yes, please explain)   Hour  7:38am   No   Treatment: eval    Have you advised the patient to remain off work five days or more?   No  [] Yes  If yes, indicate dates: From_ _                                                      To __ _  [] No   If no, is the injured employee capable of: [] full duty Yes   [] modified duty      If modified duty, specify any limitations / restrictions:__________________  ___ ___________________________     X-Ray Findings: Negative    From information given by the employee, together with medical  evidence, can you directly connect this injury or occupational disease as job incurred?  []Yes   [] No Yes    Is additional medical care by a physician indicated? []Yes [] No  Yes  Comments:cardiology    Do you know of any previous injury or disease contributing to this condition or occupational disease? []Yes [] No (Explain if yes)                          No   Date  4/11/2025 Print Health Care Provider’s Name  Fransico Currie  I certify that the employer’s copy of  this form was delivered to the employer on:   Address  [address] [place] [address]  INSURER'S USE ONLY                       [facility] Zip   [address] Provider’s Tax ID Number   [id number]   Telephone  Dept: [phone]    Health Care Provider’s Original or Electronic Signature      e-FRANSICO Rose M.D.    Degree (MD,DO, DC,PA-C,APRN)  MD  Choose (if applicable)      ORIGINAL - TREATING HEALTHCARE PROVIDER PAGE 2 - INSURER/TPA PAGE 3 - EMPLOYER PAGE 4 - EMPLOYEE             Form C-4 (rev.02/25)

## 2025-04-11 NOTE — ED TRIAGE NOTES
.  Chief Complaint   Patient presents with    Chest Pain     Pt walked in with a colleague from work. Pt complains of left chest pain, that feels like pinching, aching and burning all at once. Pt reports that the pain is constant. He reports being seen at [facility] on Wednesday for chest pain. Pt reports being prescribed Pepcid Ac upon discharge, but that the medication didn't alleviate symptoms.

## 2025-04-18 ENCOUNTER — TELEPHONE (OUTPATIENT)
Dept: CARDIOLOGY | Facility: MEDICAL CENTER | Age: 45
End: 2025-04-18
Payer: COMMERCIAL

## 2025-04-18 NOTE — TELEPHONE ENCOUNTER
Spoke to patient in regards to records for NP appointment with [facility].     Patient has seen a cardiologist before?  No   If yes, where?:     Any recent cardiac testing outside of [facility]?  No       Were any records requested?  No

## 2025-04-25 ENCOUNTER — OFFICE VISIT (OUTPATIENT)
Dept: MEDICAL GROUP | Facility: PHYSICIAN GROUP | Age: 45
End: 2025-04-25
Payer: COMMERCIAL

## 2025-04-25 VITALS
DIASTOLIC BLOOD PRESSURE: 70 MMHG | BODY MASS INDEX: 36.96 KG/M2 | SYSTOLIC BLOOD PRESSURE: 122 MMHG | OXYGEN SATURATION: 95 % | WEIGHT: 264 LBS | TEMPERATURE: 97.7 F | HEART RATE: 70 BPM | HEIGHT: 71 IN

## 2025-04-25 DIAGNOSIS — Z91.89 AT RISK FOR OBSTRUCTIVE SLEEP APNEA: ICD-10-CM

## 2025-04-25 DIAGNOSIS — R93.1 AGATSTON CORONARY ARTERY CALCIUM SCORE GREATER THAN 400: ICD-10-CM

## 2025-04-25 DIAGNOSIS — R07.9 CHEST PAIN, UNSPECIFIED TYPE: ICD-10-CM

## 2025-04-25 DIAGNOSIS — J45.990 EXERCISE INDUCED BRONCHOSPASM: ICD-10-CM

## 2025-04-25 PROBLEM — R03.0 ELEVATED BLOOD PRESSURE READING: Status: RESOLVED | Noted: 2025-04-08 | Resolved: 2025-04-25

## 2025-04-25 PROCEDURE — 3078F DIAST BP <80 MM HG: CPT | Performed by: STUDENT IN AN ORGANIZED HEALTH CARE EDUCATION/TRAINING PROGRAM

## 2025-04-25 PROCEDURE — 3074F SYST BP LT 130 MM HG: CPT | Performed by: STUDENT IN AN ORGANIZED HEALTH CARE EDUCATION/TRAINING PROGRAM

## 2025-04-25 PROCEDURE — 99214 OFFICE O/P EST MOD 30 MIN: CPT | Performed by: STUDENT IN AN ORGANIZED HEALTH CARE EDUCATION/TRAINING PROGRAM

## 2025-04-25 RX ORDER — ALBUTEROL SULFATE 90 UG/1
2 INHALANT RESPIRATORY (INHALATION) EVERY 4 HOURS PRN
Qty: 1 EACH | Refills: 0 | Status: SHIPPED | OUTPATIENT
Start: 2025-04-25

## 2025-04-25 RX ORDER — NAPROXEN 500 MG/1
500 TABLET ORAL 2 TIMES DAILY WITH MEALS
Qty: 30 TABLET | Refills: 0 | Status: SHIPPED | OUTPATIENT
Start: 2025-04-25 | End: 2025-04-29

## 2025-04-25 ASSESSMENT — FIBROSIS 4 INDEX: FIB4 SCORE: 1.33

## 2025-04-25 NOTE — PROGRESS NOTES
Subjective:     CC:   Chief Complaint   Patient presents with    Follow-Up       HPI:   Obinna presents today for ER follow up.    Obinna was seen about 3 weeks ago to discuss his elevated coronary artery calcium score, completed through work. He is a  and recently underwent his yearly employment health assessment, which included a treadmill stress ECG and a CT coronary artery calcium score. He reports his stress test was normal but his calcium score is significantly elevated at 2982. The imaging was completed at [facility] and report is scanned into chart. He has had an exercise stress test yearly since  and reports results have always been normal. He was started on Crestor 20 mg daily and aspirin 81 mg daily, which he has been taking consistently, and was referred to cardiology.    For the past 3-4 months, he has been experiencing intermittent left-sided chest pain. Typically nonexertional but can occur during exercise. Not associated with shortness of breath or dizziness. Lasts anywhere from a few minutes to half an hour. Pain is localized, described as pinching or aching. Presented to the ER on 25 with this pain and was admitted for cardiac workup. EKG, stress test, chest x-ray, troponin, d-dimer and perfusion study were unremarkable. Follows up with cardiology 25.    Wonders if his chest pain could be respiratory. Reports chronic history of exercise- and allergy-induced asthma causing cough and wheezing.     Requesting new order for home sleep study, initially ordered last year but he was not able to complete it before it .  STOPBAN - Sleep Apnea Screening      Flowsheet Row Most Recent Value   S - Have you been told that you SNORE? Yes   T - Are you often TIRED during the day? Yes   O - Do you know if you stop breathing or has anyone witnessed you stop breathing while you were asleep? (OBSTRUCTION) Yes   P - Do you have high blood PRESSURE or on medication to control high blood  "pressure? No   B - Is your Body Mass Index greater than 35? (BMI) Yes   A - Are you 50 years old or older? (AGE) No   N - Are you a male with a NECK circumference greater than 17 inches, or a female with a neck circumference greater than 16 inches? Yes   G - Are you male? (GENDER) Yes   STOPBANG Total Score 6   MIRELLA Risk High Risk                  Past medical, surgical, family, and social history were reviewed and updated.     Medications:    Current Outpatient Medications:     naproxen (NAPROSYN) 500 MG Tab, Take 1 Tablet by mouth 2 times a day with meals., Disp: 30 Tablet, Rfl: 0    albuterol 108 (90 Base) MCG/ACT Aero Soln inhalation aerosol, Inhale 2 Puffs every four hours as needed for Shortness of Breath., Disp: 1 Each, Rfl: 0    aspirin 81 MG EC tablet, Take 81 mg by mouth every day., Disp: , Rfl:     rosuvastatin (CRESTOR) 20 MG Tab, Take 1 Tablet by mouth every day., Disp: 90 Tablet, Rfl: 3    Allergies:  Patient has no known allergies.      Health Maintenance: Completed      Objective:     Exam:  /70 (BP Location: Left arm, Patient Position: Sitting, BP Cuff Size: Adult)   Pulse 70   Temp 36.5 °C (97.7 °F) (Temporal)   Ht 1.803 m (5' 11\")   Wt 120 kg (264 lb)   SpO2 95%   BMI 36.82 kg/m²  Body mass index is 36.82 kg/m².    Physical Exam  Constitutional:       General: He is not in acute distress.  HENT:      Mouth/Throat:      Mouth: Mucous membranes are moist.   Eyes:      Extraocular Movements: Extraocular movements intact.   Cardiovascular:      Rate and Rhythm: Normal rate and regular rhythm.      Heart sounds: No murmur heard.     No friction rub. No gallop.   Pulmonary:      Effort: Pulmonary effort is normal.      Breath sounds: No wheezing, rhonchi or rales.   Musculoskeletal:      Cervical back: Neck supple.   Skin:     General: Skin is warm and dry.   Neurological:      Mental Status: He is alert.   Psychiatric:         Mood and Affect: Mood normal.         Labs:     Lab Results "   Component Value Date/Time    CHOLSTRLTOT 97 (L) 04/09/2025 0201    TRIGLYCERIDE 93 04/09/2025 0201    HDL 31 (A) 04/09/2025 0201    LDL 47 04/09/2025 0201         Assessment & Plan:     45 y.o. male with the following -     1. Chest pain, unspecified type  Intermittent focal left-sided chest pain for the past few months, not necessary associated with exertion. Hospital cardiac workup was unremarkable. Sees cardiology next week. Symptoms seem less suspicious for anginal chest pain. Tried Pepcid, which he did not find helpful so he discontinued it. Could be musculoskeletal, was given a prescription for naproxen by ER provider, but has not taken it. He can go ahead and try this to see if it is helpful. Less likely related to his mild intermittent asthma as he does not experience the chest pain when he experiences his other asthma symptoms. Will follow up after he has seen cardiology for evaluation.    2. Agatston coronary artery calcium score greater than 400  Significantly elevated CAC score of 2982. He will continue rosuvastatin 20 mg daily and aspirin 81 mg daily. Follow up with cardiologist as planned for any further recommendations. Genetic testing for familial hypercholesterolemia was previously ordered.    3. Exercise induced bronchospasm  Chronic, intermittent. Currently stable. Trial albuterol inhaler as needed for symptom relief and/or prior to exercise.  - albuterol 108 (90 Base) MCG/ACT Aero Soln inhalation aerosol; Inhale 2 Puffs every four hours as needed for Shortness of Breath.  Dispense: 1 Each; Refill: 0    4. At risk for obstructive sleep apnea  Multiple risk factors for sleep apnea. STOP-BANG score 6. Ordered home sleep study.  - Overnight Home Sleep Study; Future        Return in about 6 weeks (around 6/6/2025) for follow up chest pain.    Please note that this dictation was created using voice recognition software. I have made every reasonable attempt to correct obvious errors, but I expect that  there are errors of grammar and possibly content that I did not discover before finalizing the note.      I have reviewed and agree with history, assessment and plan for office encounter on 4/25/2025 with Advanced Practice Provider: Doris Orozco PA-C.  Face to face encounter/direct observation: No  Suggested changes to plan or follow-up: none   Cece Merritt M.D.

## 2025-04-29 ENCOUNTER — TELEPHONE (OUTPATIENT)
Dept: CARDIOLOGY | Facility: MEDICAL CENTER | Age: 45
End: 2025-04-29

## 2025-04-29 ENCOUNTER — OFFICE VISIT (OUTPATIENT)
Dept: CARDIOLOGY | Facility: MEDICAL CENTER | Age: 45
End: 2025-04-29
Attending: STUDENT IN AN ORGANIZED HEALTH CARE EDUCATION/TRAINING PROGRAM
Payer: COMMERCIAL

## 2025-04-29 ENCOUNTER — APPOINTMENT (OUTPATIENT)
Dept: CARDIOLOGY | Facility: PHYSICIAN GROUP | Age: 45
End: 2025-04-29
Attending: STUDENT IN AN ORGANIZED HEALTH CARE EDUCATION/TRAINING PROGRAM
Payer: COMMERCIAL

## 2025-04-29 VITALS
HEIGHT: 71 IN | RESPIRATION RATE: 16 BRPM | WEIGHT: 264.6 LBS | SYSTOLIC BLOOD PRESSURE: 130 MMHG | DIASTOLIC BLOOD PRESSURE: 70 MMHG | HEART RATE: 88 BPM | OXYGEN SATURATION: 94 % | BODY MASS INDEX: 37.04 KG/M2

## 2025-04-29 DIAGNOSIS — E78.49 OTHER HYPERLIPIDEMIA: ICD-10-CM

## 2025-04-29 DIAGNOSIS — R07.2 PRECORDIAL PAIN: ICD-10-CM

## 2025-04-29 DIAGNOSIS — I25.10 CORONARY ARTERIOSCLEROSIS: ICD-10-CM

## 2025-04-29 DIAGNOSIS — R93.1 AGATSTON CORONARY ARTERY CALCIUM SCORE GREATER THAN 400: ICD-10-CM

## 2025-04-29 DIAGNOSIS — R94.39 CARDIOVASCULAR STRESS TEST ABNORMAL: ICD-10-CM

## 2025-04-29 PROCEDURE — 99214 OFFICE O/P EST MOD 30 MIN: CPT | Performed by: INTERNAL MEDICINE

## 2025-04-29 RX ORDER — METOPROLOL SUCCINATE 25 MG/1
25 TABLET, EXTENDED RELEASE ORAL DAILY
Qty: 30 TABLET | Refills: 3 | Status: SHIPPED | OUTPATIENT
Start: 2025-04-29

## 2025-04-29 RX ORDER — ROSUVASTATIN CALCIUM 20 MG/1
20 TABLET, COATED ORAL DAILY
Qty: 90 TABLET | Refills: 3
Start: 2025-04-29

## 2025-04-29 ASSESSMENT — FIBROSIS 4 INDEX: FIB4 SCORE: 1.33

## 2025-04-29 NOTE — LETTER
April 29, 2025         Patient: Obinna Bryan   YOB: 1980   Date of Visit: 4/29/2025           To Whom it May Concern:    Obinna Bryan was seen in my clinic on 4/29/2025. He may return to work on on Light Duty until Cardiac Evaluation is completed..    If you have any questions or concerns, please don't hesitate to call.        Sincerely,           Mello Huber M.D.  Electronically Signed

## 2025-04-29 NOTE — LETTER
April 29, 2025         Patient: Obinna Bryan   YOB: 1980   Date of Visit: 4/29/2025           To Whom it May Concern:    Obinna Bryan was seen in my clinic on 4/29/2025. 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    If you have any questions or concerns, please don't hesitate to call.        Sincerely,           Mello Huber M.D.  Electronically Signed

## 2025-04-29 NOTE — PROGRESS NOTES
"INTERVENTIONAL CARDIOLOGY NEW PATIENT CONSULTATION    PCP: Doris Orozco P.A.-C.    1. Cardiovascular stress test abnormal    2. Coronary arteriosclerosis    3. Precordial pain    4. Agatston coronary artery calcium score greater than 400    5. Other hyperlipidemia        Obinna Bryan is experiencing chest symptoms potentially consistent with angina along with an abnormal stress test compatible with ischemia in the LAD territory. I advised he continue aspirin and rosuvastatin and will arrange a heart catheterization.  He will initiate metoprolol.    Follow up: 3 months      History: Obinna Bryan is a 45 y.o. male  without significant medical history presents for assessment of chest discomfort.  Symptoms been present for at least several months and are occurring several times daily now.  He has had longer lived and short-lived episodes.  1 time he had a 6-hour spell he went to the emergency room where cardiac enzymes were unremarkable, there were dynamic T wave inversions.  He he underwent  a stress MPI which showed anterior ischemia per my interpretation though the official read was no evidence of ischemia.  He also has a coronary artery calcium score greater than 2000 .      PE:  /70 (BP Location: Left arm, Patient Position: Sitting, BP Cuff Size: Adult)   Pulse 88   Resp 16   Ht 1.803 m (5' 11\")   Wt 120 kg (264 lb 9.6 oz)   SpO2 94%   BMI 36.90 kg/m²   GEN: NAD  RESP: CTAB  CVS: RRR, No M/R/G  ABD: Soft, NT/ND  EXT: WWP, no edema    Today's encounter addressed an illness with threat to life/bodily function acute chest pain-unstable angina  () Today's E/M visit is associated with medical care services that serve as the continuing focal point for all needed health care services and/or with medical care services that  are part of ongoing care related to a patient's single, serious condition, or a complex condition: This includes  furnishing services to patients on an ongoing " basis that result in care that is personalized  to the patient. The services result in a comprehensive, longitudinal, and continuous  relationship with the patient and involve delivery of team-based care that is accessible, coordinated with other practitioners and providers, and integrated with the broader health  care landscape.     The ASCVD Risk score (Alfreda BANEGAS, et al., 2019) failed to calculate.    Studies  Lab Results   Component Value Date/Time    CHOLSTRLTOT 97 (L) 04/09/2025 02:01 AM    LDL 47 04/09/2025 02:01 AM    HDL 31 (A) 04/09/2025 02:01 AM    TRIGLYCERIDE 93 04/09/2025 02:01 AM       Lab Results   Component Value Date/Time    SODIUM 138 04/11/2025 08:32 AM    POTASSIUM 4.1 04/11/2025 08:32 AM    CHLORIDE 107 04/11/2025 08:32 AM    CO2 23 04/11/2025 08:32 AM    GLUCOSE 118 (H) 04/11/2025 08:32 AM    BUN 15 04/11/2025 08:32 AM    CREATININE 0.91 04/11/2025 08:32 AM      Lab Results   Component Value Date/Time    PROTHROMBTM 12.8 06/01/2017 09:36 AM    INR 0.98 06/01/2017 09:36 AM      Lab Results   Component Value Date/Time    WBC 6.9 04/11/2025 08:32 AM    RBC 5.47 04/11/2025 08:32 AM    HEMOGLOBIN 17.0 04/11/2025 08:32 AM    HEMATOCRIT 48.3 04/11/2025 08:32 AM    MCV 88.3 04/11/2025 08:32 AM    MCH 31.1 04/11/2025 08:32 AM    MCHC 35.2 04/11/2025 08:32 AM    MPV 8.3 (L) 04/11/2025 08:32 AM    NEUTSPOLYS 51.30 04/11/2025 08:32 AM    LYMPHOCYTES 35.10 04/11/2025 08:32 AM    MONOCYTES 9.20 04/11/2025 08:32 AM    EOSINOPHILS 3.20 04/11/2025 08:32 AM    BASOPHILS 0.90 04/11/2025 08:32 AM        History reviewed. No pertinent past medical history.  Past Surgical History:   Procedure Laterality Date    HAND SURGERY      rt hand metal plate broken bone     [id number]      lt knee legament     SHOULDER SURGERY      rt shoulder      No Known Allergies  Outpatient Encounter Medications as of 4/29/2025   Medication Sig Dispense Refill    rosuvastatin (CRESTOR) 20 MG Tab Take 1 Tablet by mouth every day. 90 Tablet  3    albuterol 108 (90 Base) MCG/ACT Aero Soln inhalation aerosol Inhale 2 Puffs every four hours as needed for Shortness of Breath. 1 Each 0    aspirin 81 MG EC tablet Take 81 mg by mouth every day.      [DISCONTINUED] naproxen (NAPROSYN) 500 MG Tab Take 1 Tablet by mouth 2 times a day with meals. (Patient not taking: Reported on 4/29/2025) 30 Tablet 0    [DISCONTINUED] rosuvastatin (CRESTOR) 20 MG Tab Take 1 Tablet by mouth every day. (Patient taking differently: Take 40 mg by mouth every day.) 90 Tablet 3     No facility-administered encounter medications on file as of 4/29/2025.     Social History     Socioeconomic History    Marital status: Single     Spouse name: Not on file    Number of children: Not on file    Years of education: Not on file    Highest education level: Not on file   Occupational History    Not on file   Tobacco Use    Smoking status: Never    Smokeless tobacco: Former     Types: Chew   Vaping Use    Vaping status: Never Used   Substance and Sexual Activity    Alcohol use: Not Currently    Drug use: No    Sexual activity: Not on file   Other Topics Concern    Not on file   Social History Narrative    Not on file     Social Drivers of Health     Financial Resource Strain: Not on file   Food Insecurity: No Food Insecurity (4/8/2025)    Hunger Vital Sign     Worried About Running Out of Food in the Last Year: Never true     Ran Out of Food in the Last Year: Never true   Transportation Needs: No Transportation Needs (4/8/2025)    PRAPARE - Transportation     Lack of Transportation (Medical): No     Lack of Transportation (Non-Medical): No   Physical Activity: Not on file   Stress: Not on file   Social Connections: Not on file   Intimate Partner Violence: Not At Risk (4/8/2025)    Humiliation, Afraid, Rape, and Kick questionnaire     Fear of Current or Ex-Partner: No     Emotionally Abused: No     Physically Abused: No     Sexually Abused: No   Housing Stability: Unknown (4/8/2025)    Housing  Stability Vital Sign     Unable to Pay for Housing in the Last Year: Not on file     Number of Times Moved in the Last Year: 0     Homeless in the Last Year: No     Family History   Problem Relation Age of Onset    Diabetes Maternal Uncle     Stroke Maternal Grandmother     Cancer Neg Hx     Ovarian Cancer Neg Hx     Tubal Cancer Neg Hx     Peritoneal Cancer Neg Hx     Colorectal Cancer Neg Hx     Breast Cancer Neg Hx     Heart Disease Neg Hx        Chief Complaint   Patient presents with    Pain     Unspecified chest pain    Other     Agatston coronary artery calcium score greater than 400         ROS:   10 point review systems is otherwise negative except as per the HPI

## 2025-04-29 NOTE — Clinical Note
REFERRAL APPROVAL NOTICE         Sent on April 29, 2025                   Obinna N Irvin  [address] [place] [address]                   Dear Mr. Bryan,    After a careful review of the medical information and benefit coverage, [facility] has processed your referral. See below for additional details.    If applicable, you must be actively enrolled with your insurance for coverage of the authorized service. If you have any questions regarding your coverage, please contact your insurance directly.    REFERRAL INFORMATION   Referral #:  [id number]  Referred-To Department    Referred-By Provider:  Pulmonary and Sleep Medicine    Doris Orozco P.A.-C.   [facility]      [address]  [place] [address] [phone] [address]  [place] [id number] [phone]    Referral Start Date:  04/25/2025  Referral End Date:   04/25/2026             SCHEDULING  If you do not already have an appointment, please call [phone] to make an appointment.     MORE INFORMATION  If you do not already have a [facility] account, sign up at: [email or web address]  You can access your medical information, make appointments, see lab results, billing information, and more.  If you have questions regarding this referral, please contact  the [facility] Referrals department at:             [phone]. Monday - Friday 8:00AM - 5:00PM.     Sincerely,    [facility]

## 2025-04-30 NOTE — TELEPHONE ENCOUNTER
Patient is scheduled on 5-6-25 for a C w/poss with Dr. Huber. No meds to stop and patient to check in at 5:30 for a 7:30 procedure. H&P was done on 4-29-25 by . Pre admit to call patient.

## 2025-05-01 ENCOUNTER — APPOINTMENT (OUTPATIENT)
Dept: ADMISSIONS | Facility: MEDICAL CENTER | Age: 45
DRG: 234 | End: 2025-05-01
Attending: INTERNAL MEDICINE
Payer: COMMERCIAL

## 2025-05-02 ENCOUNTER — PRE-ADMISSION TESTING (OUTPATIENT)
Dept: ADMISSIONS | Facility: MEDICAL CENTER | Age: 45
End: 2025-05-02
Payer: COMMERCIAL

## 2025-05-05 ENCOUNTER — PRE-ADMISSION TESTING (OUTPATIENT)
Dept: ADMISSIONS | Facility: MEDICAL CENTER | Age: 45
DRG: 234 | End: 2025-05-05
Attending: INTERNAL MEDICINE
Payer: COMMERCIAL

## 2025-05-05 DIAGNOSIS — Z01.810 PRE-OPERATIVE CARDIOVASCULAR EXAMINATION: ICD-10-CM

## 2025-05-05 DIAGNOSIS — Z01.812 PRE-OPERATIVE LABORATORY EXAMINATION: ICD-10-CM

## 2025-05-05 LAB
ALBUMIN SERPL BCP-MCNC: 4.2 G/DL (ref 3.2–4.9)
ALBUMIN/GLOB SERPL: 1.4 G/DL
ALP SERPL-CCNC: 71 U/L (ref 30–99)
ALT SERPL-CCNC: 40 U/L (ref 2–50)
ANION GAP SERPL CALC-SCNC: 8 MMOL/L (ref 7–16)
APTT PPP: 32 SEC (ref 24.7–36)
AST SERPL-CCNC: 35 U/L (ref 12–45)
BILIRUB SERPL-MCNC: 0.5 MG/DL (ref 0.1–1.5)
BUN SERPL-MCNC: 14 MG/DL (ref 8–22)
CALCIUM ALBUM COR SERPL-MCNC: 9.4 MG/DL (ref 8.5–10.5)
CALCIUM SERPL-MCNC: 9.6 MG/DL (ref 8.5–10.5)
CHLORIDE SERPL-SCNC: 103 MMOL/L (ref 96–112)
CO2 SERPL-SCNC: 30 MMOL/L (ref 20–33)
CREAT SERPL-MCNC: 0.92 MG/DL (ref 0.5–1.4)
EKG IMPRESSION: NORMAL
ERYTHROCYTE [DISTWIDTH] IN BLOOD BY AUTOMATED COUNT: 42.7 FL (ref 35.9–50)
GFR SERPLBLD CREATININE-BSD FMLA CKD-EPI: 104 ML/MIN/1.73 M 2
GLOBULIN SER CALC-MCNC: 3.1 G/DL (ref 1.9–3.5)
GLUCOSE SERPL-MCNC: 148 MG/DL (ref 65–99)
HCT VFR BLD AUTO: 48.8 % (ref 42–52)
HGB BLD-MCNC: 16.3 G/DL (ref 14–18)
INR PPP: 1.17 (ref 0.87–1.13)
MCH RBC QN AUTO: 30.5 PG (ref 27–33)
MCHC RBC AUTO-ENTMCNC: 33.4 G/DL (ref 32.3–36.5)
MCV RBC AUTO: 91.4 FL (ref 81.4–97.8)
PLATELET # BLD AUTO: 218 K/UL (ref 164–446)
PMV BLD AUTO: 8.8 FL (ref 9–12.9)
POTASSIUM SERPL-SCNC: 4.2 MMOL/L (ref 3.6–5.5)
PROT SERPL-MCNC: 7.3 G/DL (ref 6–8.2)
PROTHROMBIN TIME: 15 SEC (ref 12–14.6)
RBC # BLD AUTO: 5.34 M/UL (ref 4.7–6.1)
SODIUM SERPL-SCNC: 141 MMOL/L (ref 135–145)
WBC # BLD AUTO: 7.8 K/UL (ref 4.8–10.8)

## 2025-05-05 PROCEDURE — 36415 COLL VENOUS BLD VENIPUNCTURE: CPT

## 2025-05-05 PROCEDURE — 93005 ELECTROCARDIOGRAM TRACING: CPT | Mod: TC

## 2025-05-05 PROCEDURE — 93010 ELECTROCARDIOGRAM REPORT: CPT | Performed by: INTERNAL MEDICINE

## 2025-05-05 PROCEDURE — 85610 PROTHROMBIN TIME: CPT

## 2025-05-05 PROCEDURE — 80053 COMPREHEN METABOLIC PANEL: CPT

## 2025-05-05 PROCEDURE — 85730 THROMBOPLASTIN TIME PARTIAL: CPT

## 2025-05-05 PROCEDURE — 85027 COMPLETE CBC AUTOMATED: CPT

## 2025-05-06 ENCOUNTER — APPOINTMENT (OUTPATIENT)
Dept: CARDIOLOGY | Facility: MEDICAL CENTER | Age: 45
DRG: 234 | End: 2025-05-06
Attending: INTERNAL MEDICINE
Payer: COMMERCIAL

## 2025-05-06 ENCOUNTER — HOSPITAL ENCOUNTER (INPATIENT)
Facility: MEDICAL CENTER | Age: 45
LOS: 6 days | DRG: 234 | End: 2025-05-12
Attending: INTERNAL MEDICINE | Admitting: INTERNAL MEDICINE
Payer: COMMERCIAL

## 2025-05-06 ENCOUNTER — APPOINTMENT (OUTPATIENT)
Dept: RADIOLOGY | Facility: MEDICAL CENTER | Age: 45
DRG: 234 | End: 2025-05-06
Payer: COMMERCIAL

## 2025-05-06 DIAGNOSIS — G89.18 POST-OP PAIN: ICD-10-CM

## 2025-05-06 DIAGNOSIS — Z95.1 S/P CABG X 4: ICD-10-CM

## 2025-05-06 DIAGNOSIS — R07.9 CHEST PAIN, UNSPECIFIED TYPE: ICD-10-CM

## 2025-05-06 DIAGNOSIS — R94.39 CARDIOVASCULAR STRESS TEST ABNORMAL: ICD-10-CM

## 2025-05-06 PROBLEM — I25.10 CAD IN NATIVE ARTERY: Status: ACTIVE | Noted: 2025-05-06

## 2025-05-06 PROCEDURE — 99254 IP/OBS CNSLTJ NEW/EST MOD 60: CPT | Performed by: INTERNAL MEDICINE

## 2025-05-06 PROCEDURE — 160002 HCHG RECOVERY MINUTES (STAT)

## 2025-05-06 PROCEDURE — A9270 NON-COVERED ITEM OR SERVICE: HCPCS | Performed by: INTERNAL MEDICINE

## 2025-05-06 PROCEDURE — 99152 MOD SED SAME PHYS/QHP 5/>YRS: CPT | Performed by: INTERNAL MEDICINE

## 2025-05-06 PROCEDURE — 160015 HCHG STAT PREOP MINUTES

## 2025-05-06 PROCEDURE — 4A023N7 MEASUREMENT OF CARDIAC SAMPLING AND PRESSURE, LEFT HEART, PERCUTANEOUS APPROACH: ICD-10-PCS | Performed by: INTERNAL MEDICINE

## 2025-05-06 PROCEDURE — A9270 NON-COVERED ITEM OR SERVICE: HCPCS | Performed by: NURSE PRACTITIONER

## 2025-05-06 PROCEDURE — 700117 HCHG RX CONTRAST REV CODE 255: Performed by: INTERNAL MEDICINE

## 2025-05-06 PROCEDURE — 700102 HCHG RX REV CODE 250 W/ 637 OVERRIDE(OP): Performed by: NURSE PRACTITIONER

## 2025-05-06 PROCEDURE — B2151ZZ FLUOROSCOPY OF LEFT HEART USING LOW OSMOLAR CONTRAST: ICD-10-PCS | Performed by: INTERNAL MEDICINE

## 2025-05-06 PROCEDURE — 770020 HCHG ROOM/CARE - TELE (206)

## 2025-05-06 PROCEDURE — 99153 MOD SED SAME PHYS/QHP EA: CPT

## 2025-05-06 PROCEDURE — 700101 HCHG RX REV CODE 250

## 2025-05-06 PROCEDURE — 160035 HCHG PACU - 1ST 60 MINS PHASE I

## 2025-05-06 PROCEDURE — B2111ZZ FLUOROSCOPY OF MULTIPLE CORONARY ARTERIES USING LOW OSMOLAR CONTRAST: ICD-10-PCS | Performed by: INTERNAL MEDICINE

## 2025-05-06 PROCEDURE — 93458 L HRT ARTERY/VENTRICLE ANGIO: CPT | Performed by: INTERNAL MEDICINE

## 2025-05-06 PROCEDURE — 99255 IP/OBS CONSLTJ NEW/EST HI 80: CPT | Mod: 57,FS | Performed by: THORACIC SURGERY (CARDIOTHORACIC VASCULAR SURGERY)

## 2025-05-06 PROCEDURE — 700111 HCHG RX REV CODE 636 W/ 250 OVERRIDE (IP): Mod: JZ

## 2025-05-06 PROCEDURE — 700102 HCHG RX REV CODE 250 W/ 637 OVERRIDE(OP): Performed by: INTERNAL MEDICINE

## 2025-05-06 RX ORDER — HEPARIN SODIUM 1000 [USP'U]/ML
INJECTION, SOLUTION INTRAVENOUS; SUBCUTANEOUS
Status: COMPLETED
Start: 2025-05-06 | End: 2025-05-06

## 2025-05-06 RX ORDER — VERAPAMIL HYDROCHLORIDE 2.5 MG/ML
INJECTION INTRAVENOUS
Status: COMPLETED
Start: 2025-05-06 | End: 2025-05-06

## 2025-05-06 RX ORDER — HEPARIN SODIUM 200 [USP'U]/100ML
INJECTION, SOLUTION INTRAVENOUS
Status: COMPLETED
Start: 2025-05-06 | End: 2025-05-06

## 2025-05-06 RX ORDER — MORPHINE SULFATE 4 MG/ML
1-5 INJECTION INTRAVENOUS
Status: DISCONTINUED | OUTPATIENT
Start: 2025-05-06 | End: 2025-05-08

## 2025-05-06 RX ORDER — MIDAZOLAM HYDROCHLORIDE 1 MG/ML
INJECTION INTRAMUSCULAR; INTRAVENOUS
Status: COMPLETED
Start: 2025-05-06 | End: 2025-05-06

## 2025-05-06 RX ORDER — METOPROLOL SUCCINATE 25 MG/1
25 TABLET, EXTENDED RELEASE ORAL DAILY
Status: DISCONTINUED | OUTPATIENT
Start: 2025-05-06 | End: 2025-05-08

## 2025-05-06 RX ORDER — HYDRALAZINE HYDROCHLORIDE 20 MG/ML
10 INJECTION INTRAMUSCULAR; INTRAVENOUS EVERY 4 HOURS PRN
Status: DISCONTINUED | OUTPATIENT
Start: 2025-05-06 | End: 2025-05-08

## 2025-05-06 RX ORDER — ACETAMINOPHEN 325 MG/1
650 TABLET ORAL EVERY 6 HOURS PRN
Status: DISCONTINUED | OUTPATIENT
Start: 2025-05-06 | End: 2025-05-08

## 2025-05-06 RX ORDER — NITROGLYCERIN 0.4 MG/1
0.4 TABLET SUBLINGUAL
Status: DISCONTINUED | OUTPATIENT
Start: 2025-05-06 | End: 2025-05-08

## 2025-05-06 RX ORDER — ASPIRIN 81 MG/1
81 TABLET ORAL DAILY
Status: DISCONTINUED | OUTPATIENT
Start: 2025-05-06 | End: 2025-05-08

## 2025-05-06 RX ORDER — SODIUM CHLORIDE 9 MG/ML
1.5 INJECTION, SOLUTION INTRAVENOUS CONTINUOUS
Status: ACTIVE | OUTPATIENT
Start: 2025-05-06 | End: 2025-05-06

## 2025-05-06 RX ORDER — ROSUVASTATIN CALCIUM 10 MG/1
20 TABLET, COATED ORAL DAILY
Status: DISCONTINUED | OUTPATIENT
Start: 2025-05-06 | End: 2025-05-08

## 2025-05-06 RX ORDER — AMOXICILLIN 250 MG
2 CAPSULE ORAL EVERY EVENING
Status: DISCONTINUED | OUTPATIENT
Start: 2025-05-06 | End: 2025-05-08

## 2025-05-06 RX ORDER — LIDOCAINE HYDROCHLORIDE 20 MG/ML
INJECTION, SOLUTION INFILTRATION; PERINEURAL
Status: COMPLETED
Start: 2025-05-06 | End: 2025-05-06

## 2025-05-06 RX ORDER — POLYETHYLENE GLYCOL 3350 17 G/17G
1 POWDER, FOR SOLUTION ORAL
Status: DISCONTINUED | OUTPATIENT
Start: 2025-05-06 | End: 2025-05-08

## 2025-05-06 RX ADMIN — LIDOCAINE HYDROCHLORIDE: 20 INJECTION, SOLUTION INFILTRATION; PERINEURAL at 08:19

## 2025-05-06 RX ADMIN — ROSUVASTATIN CALCIUM 20 MG: 20 TABLET, FILM COATED ORAL at 17:04

## 2025-05-06 RX ADMIN — MIDAZOLAM HYDROCHLORIDE 1.5 MG: 1 INJECTION, SOLUTION INTRAMUSCULAR; INTRAVENOUS at 09:08

## 2025-05-06 RX ADMIN — VERAPAMIL HYDROCHLORIDE 5 MG: 2.5 INJECTION, SOLUTION INTRAVENOUS at 08:19

## 2025-05-06 RX ADMIN — HEPARIN SODIUM: 1000 INJECTION, SOLUTION INTRAVENOUS; SUBCUTANEOUS at 08:20

## 2025-05-06 RX ADMIN — HEPARIN SODIUM: 1000 INJECTION, SOLUTION INTRAVENOUS; SUBCUTANEOUS at 08:52

## 2025-05-06 RX ADMIN — ASPIRIN 81 MG: 81 TABLET, COATED ORAL at 12:34

## 2025-05-06 RX ADMIN — METOPROLOL SUCCINATE 25 MG: 25 TABLET, EXTENDED RELEASE ORAL at 12:33

## 2025-05-06 RX ADMIN — NITROGLYCERIN 10 ML: 20 INJECTION INTRAVENOUS at 08:20

## 2025-05-06 RX ADMIN — FENTANYL CITRATE 75 MCG: 50 INJECTION, SOLUTION INTRAMUSCULAR; INTRAVENOUS at 09:08

## 2025-05-06 RX ADMIN — HEPARIN SODIUM 2000 UNITS: 200 INJECTION, SOLUTION INTRAVENOUS at 08:16

## 2025-05-06 RX ADMIN — IOHEXOL 59 ML: 350 INJECTION, SOLUTION INTRAVENOUS at 09:09

## 2025-05-06 ASSESSMENT — ENCOUNTER SYMPTOMS
FEVER: 0
FALLS: 0
WEIGHT LOSS: 0
NERVOUS/ANXIOUS: 0
HEADACHES: 0
EYES NEGATIVE: 1
BRUISES/BLEEDS EASILY: 0
SHORTNESS OF BREATH: 0
LOSS OF CONSCIOUSNESS: 0
NAUSEA: 0
DEPRESSION: 0
COUGH: 0
ABDOMINAL PAIN: 0
DIZZINESS: 0
FATIGUE: 0
FOCAL WEAKNESS: 0
PALPITATIONS: 0

## 2025-05-06 ASSESSMENT — PAIN DESCRIPTION - PAIN TYPE
TYPE: SURGICAL PAIN
TYPE: ACUTE PAIN
TYPE: SURGICAL PAIN

## 2025-05-06 ASSESSMENT — FIBROSIS 4 INDEX: FIB4 SCORE: 1.17

## 2025-05-06 NOTE — CONSULTS
"  REFERRING PHYSICIAN: Mello Huber MD.     CONSULTING PHYSICIAN: Elayne Valdovinos MD     CHIEF COMPLAINT: Coronary artery disease    HISTORY OF PRESENT ILLNESS: The patient is a 45 y.o. male  with  past medical history significant for hyperlipidemia, exercise induced asthma, fatty liver disease, and obstructive sleep apnea, who was being evaluated in the outpatient setting by cardiology for anginal symptoms that have been occurring for several months, and increasing in frequency as of late. He has actually had trips to ED recently for escalation of symptoms. He underwent a stress test that was compatible with ischemia to the LAD territory, per cardiologist interpretation, as official read was negative. This prompted further work up with a left heart catheterization revealing multivessel coronary artery disease including a quite ominous proximal LAD lesion prompting admission and  consultation of our service.     The patient was seen and evaluated at bedside and endorses the above history. He currently reports no active symptoms. He denies family history of early CAD (grandfather had MI in his 70's, father  of brain cancer). He lives with his wife.    The patient has a \"friend that's like family\" in the room for the entirety of the consultation.    PAST MEDICAL HISTORY:   Active Ambulatory Problems     Diagnosis Date Noted    Rotator cuff disorder, right 10/08/2021    Cervical radiculopathy at C6 10/08/2021    Agatston coronary artery calcium score greater than 400 2025    Exercise-induced asthma 2025    Chest pain 2025    Abnormal EKG 2025    Class 2 obesity without serious comorbidity with body mass index (BMI) of 37.0 to 37.9 in adult 2025    Mixed hyperlipidemia 2025     Resolved Ambulatory Problems     Diagnosis Date Noted    Cervical radiculopathy 2021    Other sprain of right shoulder joint, subsequent encounter 2019    Shoulder pain, right " 07/23/2021    Hypokalemia 04/08/2025    Elevated blood pressure reading 04/08/2025     Past Medical History:   Diagnosis Date    Anesthesia     Asthma     Fatty liver     High cholesterol     Hypertension     Psychiatric problem     Sleep apnea     Snoring        PAST SURGICAL HISTORY:   Past Surgical History:   Procedure Laterality Date    HAND SURGERY      rt hand metal plate broken bone     [id number]      lt knee legament     SHOULDER SURGERY      rt shoulder         ALLERGIES: No Known Allergies     CURRENT MEDICATIONS:   Current Facility-Administered Medications:     aspirin EC tablet 81 mg, 81 mg, Oral, DAILY, Mello Huber M.D., 81 mg at 05/06/25 1234    acetaminophen (Tylenol) tablet 650 mg, 650 mg, Oral, Q6HRS PRN, Redet Michael, A.P.R.N.    senna-docusate (Pericolace Or Senokot S) 8.6-50 MG per tablet 2 Tablet, 2 Tablet, Oral, Q EVENING **AND** polyethylene glycol/lytes (Miralax) Packet 1 Packet, 1 Packet, Oral, QDAY PRN, Redet Michael, A.P.R.N.    hydrALAZINE (Apresoline) injection 10 mg, 10 mg, Intravenous, Q4HRS PRN, Redet Michael, A.P.R.N.    metoprolol SR (Toprol XL) tablet 25 mg, 25 mg, Oral, DAILY, Redet Michael, A.P.R.N., 25 mg at 05/06/25 1233    rosuvastatin (Crestor) tablet 20 mg, 20 mg, Oral, DAILY, Redet Michael, A.P.R.N., 20 mg at 05/06/25 1704    nitroglycerin (Nitrostat) tablet 0.4 mg, 0.4 mg, Sublingual, Q5 MIN PRN, Redet Michael, A.P.R.N.    morphine 4 MG/ML injection 1-5 mg, 1-5 mg, Intravenous, Q HOUR PRN, Redet Michael, A.P.R.N.    FAMILY HISTORY:   Family History   Problem Relation Age of Onset    Diabetes Maternal Uncle     Stroke Maternal Grandmother     Cancer Neg Hx     Ovarian Cancer Neg Hx     Tubal Cancer Neg Hx     Peritoneal Cancer Neg Hx     Colorectal Cancer Neg Hx     Breast Cancer Neg Hx     Heart Disease Neg Hx         SOCIAL HISTORY:   Social History     Socioeconomic History    Marital status:      Spouse name: Not on file    Number of children: Not on file     Years of education: Not on file    Highest education level: Not on file   Occupational History    Not on file   Tobacco Use    Smoking status: Former     Current packs/day: 0.00     Types: Cigarettes     Quit date:      Years since quittin.3    Smokeless tobacco: Former     Types: Chew   Vaping Use    Vaping status: Never Used   Substance and Sexual Activity    Alcohol use: Not Currently    Drug use: No    Sexual activity: Not on file   Other Topics Concern    Not on file   Social History Narrative    Not on file     Social Drivers of Health     Financial Resource Strain: Not on file   Food Insecurity: No Food Insecurity (2025)    Hunger Vital Sign     Worried About Running Out of Food in the Last Year: Never true     Ran Out of Food in the Last Year: Never true   Transportation Needs: No Transportation Needs (2025)    PRAPARE - Transportation     Lack of Transportation (Medical): No     Lack of Transportation (Non-Medical): No   Physical Activity: Not on file   Stress: Not on file   Social Connections: Not on file   Intimate Partner Violence: Not At Risk (2025)    Humiliation, Afraid, Rape, and Kick questionnaire     Fear of Current or Ex-Partner: No     Emotionally Abused: No     Physically Abused: No     Sexually Abused: No   Housing Stability: Unknown (2025)    Housing Stability Vital Sign     Unable to Pay for Housing in the Last Year: Not on file     Number of Times Moved in the Last Year: 0     Homeless in the Last Year: No       REVIEW OF SYSTEMS:  Review of Systems   Constitutional:  Positive for malaise/fatigue. Negative for fever and weight loss.   HENT: Negative.     Eyes: Negative.         Wears glasses   Respiratory:  Negative for cough and shortness of breath.    Cardiovascular:  Positive for chest pain (exertional discomfort previously relieved with rest; now occuring at rest and increasing in severity). Negative for leg swelling.   Gastrointestinal:  Negative for abdominal  "pain and nausea.   Genitourinary: Negative.    Musculoskeletal:  Negative for falls and joint pain.   Skin: Negative.    Neurological:  Negative for dizziness, focal weakness, loss of consciousness and headaches.   Endo/Heme/Allergies: Negative.    Psychiatric/Behavioral:  Negative for depression. The patient is not nervous/anxious.          PHYSICAL EXAMINATION:    /81   Pulse 72   Temp 36.3 °C (97.3 °F) (Temporal)   Resp 17   Ht 1.803 m (5' 11\")   Wt 119 kg (261 lb 7.5 oz)   SpO2 93%   BMI 36.47 kg/m²      Physical Exam  Vitals reviewed.   Constitutional:       General: He is not in acute distress.     Appearance: Normal appearance. He is obese.   HENT:      Head: Normocephalic and atraumatic.      Right Ear: External ear normal.      Left Ear: External ear normal.      Nose: Nose normal. No congestion.   Eyes:      General: No scleral icterus.     Extraocular Movements: Extraocular movements intact.      Conjunctiva/sclera: Conjunctivae normal.   Cardiovascular:      Rate and Rhythm: Normal rate and regular rhythm.   Pulmonary:      Effort: Pulmonary effort is normal. No respiratory distress.   Abdominal:      General: There is no distension.      Palpations: Abdomen is soft.   Musculoskeletal:         General: Normal range of motion.      Cervical back: Normal range of motion.   Skin:     General: Skin is warm and dry.      Coloration: Skin is not jaundiced.      Findings: No rash.   Neurological:      Mental Status: He is alert and oriented to person, place, and time.      Cranial Nerves: No cranial nerve deficit.   Psychiatric:         Mood and Affect: Mood normal.         Behavior: Behavior normal.           LABS REVIEWED:  Lab Results   Component Value Date/Time    SODIUM 141 05/05/2025 03:04 PM    POTASSIUM 4.2 05/05/2025 03:04 PM    CHLORIDE 103 05/05/2025 03:04 PM    CO2 30 05/05/2025 03:04 PM    GLUCOSE 148 (H) 05/05/2025 03:04 PM    BUN 14 05/05/2025 03:04 PM    CREATININE 0.92 05/05/2025 " 03:04 PM      Lab Results   Component Value Date/Time    PROTHROMBTM 15.0 (H) 05/05/2025 03:04 PM    INR 1.17 (H) 05/05/2025 03:04 PM      Lab Results   Component Value Date/Time    WBC 7.8 05/05/2025 03:04 PM    RBC 5.34 05/05/2025 03:04 PM    HEMOGLOBIN 16.3 05/05/2025 03:04 PM    HEMATOCRIT 48.8 05/05/2025 03:04 PM    MCV 91.4 05/05/2025 03:04 PM    MCH 30.5 05/05/2025 03:04 PM    MCHC 33.4 05/05/2025 03:04 PM    MPV 8.8 (L) 05/05/2025 03:04 PM    NEUTSPOLYS 51.30 04/11/2025 08:32 AM    LYMPHOCYTES 35.10 04/11/2025 08:32 AM    MONOCYTES 9.20 04/11/2025 08:32 AM    EOSINOPHILS 3.20 04/11/2025 08:32 AM    BASOPHILS 0.90 04/11/2025 08:32 AM        IMAGING REVIEWED AND INTERPRETED:    ECHOCARDIOGRAM   None    NM CARDIAC STRESS TEST 4/9/25 [facility]   NUCLEAR IMAGING INTERPRETATION  No evidence of significant jeopardized viable myocardium or prior myocardial   infarction.  Normal left ventricular size, ejection fraction, and wall motion.  ECG INTERPRETATION  No ischemic changes with stress compared to baseline ECG.    CARDIAC CATHETERIZATION 5/6/25 [facility]  Findings   Hemodynamics:   Aorta: 109/68 mmHg  LVEDP: 17 mmHg  No significant pullback gradient across the aortic valve     Coronary Anatomy              Left Main:  30% stenosis in the proximal segment              LAD:  99% stenosis in the ostial segment, proximal 70% stenosis              Ramus: 30% ostial stenosis. The upper branch has proximal 70% stenosis              LCx:  Mid 70% stenosis leading into the OM1. The OM2  is small with ostial 90% stenosis              RCA: Dominant, Diffuse luminal irregularities in the AV groove. The PDA has an ostial 70% stenosis    CT SCAN CHEST   None      IMPRESSION:  44 yo gentleman with known conditions of  hyperlipidemia, exercise induced asthma, fatty liver disease, obesity (BMI 36.5), and obstructive sleep apnea here with escalating angina symptoms indicating unstable angina. Diagnosed with severe multivessel CAD including a  very significant proximal LAD stenosis.      PLAN:  I recommend CABG x 3-4, left atrial appendage ligation next available inpatient appointment.  The procedure, its risks, benefits, potential complications and alternative treatments were discussed with the patient in detail including the risks should he decide not to undergo my recommended treatment. All of his questions were answered to his satisfaction and he is willing to proceed with the operation. The risks include death, stroke, infection: to include a rare bacterial infection related to the use of the heart/lung machine, hue-operative myocardial infarction, dysrhythmias, diaphragmatic paralysis, chest wall paresthesia, tracheostomy, kidney or other organ failure, possible return to the operating room for bleeding, bleeding requiring transfusion with its attendant risks including AIDS or hepatitis, dehiscence of surgical incisions, respiratory complications including the need for prolonged ventilator support, Protamine or other drug reaction, peripheral neuropathy, loss of limb, and miscount of surgical items. The operative mortality risk is approximately 1%. The STS mortality risk score is 1% and the morbidity and mortality risk score is 10%. The scores were discussed with patient.    Findings and recommendations have been discussed with the patient’s cardiologist, Dr. Huber.  Thank you for this very challenging consultation and participation in the patient’s care.  I will keep you apprised of all future developments.      The operation is scheduled for Thursday May 8th at 7:30 AM at [facility].         Sincerely,       Elayne Valdovinos MD.        I,  Elayne Valdovinos MD performed a substantial portion of the service face-to-face with the same patient on the same date of service. I have reviewed and agree with the care plan and complexity of problems documented by me, Elayne Valdovinos MD and/or Logan Hughes PA-C. I was personally involved in  the medical decision making, including the information below:  reviewing and interpreting the films, conducted elements of the history and physical exam, and provided the plan of care. All medical decision making was made by me.

## 2025-05-06 NOTE — PROCEDURES
"CARDIAC CATHETERIZATION REPORT    Referring Provider: Mello Huber M.D.    PROCEDURE PHYSICIAN: Mello Huber MD, [facility]  ASSISTANT: None    IMPRESSIONS:  1. Unstable angina due to critical proximal LAD stenosis with multivessel CAD  2. Moderate LV systolic dysfunction  3. Mild elevation in LVEDP 17 mmHg.     Recommendations:  CABG    Pre-procedure diagnosis: Unstable angina  Post-procedure diagnosis: Same    Procedure performed  Selective coronary angiography  Left heart catheterization    Conscious sedation was supervised by myself and administered by trained personnel using fentanyl and versed between 833 and 908. The patient tolerated sedation without complication.     Procedure Description  1. Access: 5/6 Persian right radial artery Micropuncture technique was utilized following local anesthesia with lidocaine.  A radial cocktail containing verapamil and saline was administered in the radial artery sheath.      2. Diagnostic description: The catheter was passed to the central circulation with the aide of J tipped 0.35\" wire. A 5F TIG 4.0 and 6F Pigtail were used to inject the coronary circulation and enter the left ventricle during invasive hemodynamic monitoring. Left ventriculography was performed with a pigtail catheter.     3. Closing: At completion of the procedure the relevant equipment was removed from the body and hemostasis achieved by Radial band    Findings   Hemodynamics:   Aorta: 109/68 mmHg  LVEDP: 17 mmHg  No significant pullback gradient across the aortic valve    Coronary Anatomy   Left Main:  30% stenosis in the proximal segment   LAD:  99% stenosis in the ostial segment, proximal 70% stenosis   Ramus: 30% ostial stenosis. The upper branch has proximal 70% stenosis   LCx:  Mid 70% stenosis leading into the OM1. The OM2  is small with ostial 90% stenosis   RCA: Dominant, Diffuse luminal irregularities in the AV groove. The PDA has an ostial 70% stenosis    Technical Factors  1. " Complications: None  2. Estimated Blood Loss: <50 cc  3. Specimens: None  4. Contrast Volume: 58 ml  5. Medications: Radial cocktail (Verapamil 2.5 mg, Nitroglycerin 100 mcg) Heparin to maintain ACT >250  6. Radiation (Air Kerma): 58 mGy

## 2025-05-06 NOTE — PROGRESS NOTES
Cardiology Follow Up Progress Note    Date of Service  5/6/2025    Attending Physician  Mello Huber M.D.    Chief Complaint   Unstable angina     HPI  Obinna Bryan is a 45 y.o. male admitted 5/6/2025 with medical history of elevated coronary calcium score 2000, obesity BMI 36.4 and chest pain. He was seen by Dr. Huber in the clinic, with angina. Planned for elective LHC.     Underwent elective cardiac catheterization, showed MVCAD with critical proximal LAD stenosis.    No family history of premature CAD, never smoker.          Interim Events  Right radial is soft and good peripheral pulse   NSR on the monitor   Vs stable     Review of Systems  Review of Systems   Constitutional:  Negative for fatigue.   Respiratory:  Negative for shortness of breath.    Cardiovascular:  Negative for chest pain, palpitations and leg swelling.   Gastrointestinal:  Negative for abdominal pain.   Neurological:  Negative for dizziness.   Hematological:  Does not bruise/bleed easily.       Vital signs in last 24 hours  Temp:  [35.9 °C (96.6 °F)] 35.9 °C (96.6 °F)  Pulse:  [54-69] 60  Resp:  [16] 16  BP: (100-132)/(57-88) 125/78  SpO2:  [88 %-94 %] 92 %    Physical Exam  Physical Exam  Vitals and nursing note reviewed.   Constitutional:       Appearance: Normal appearance.   HENT:      Head: Normocephalic and atraumatic.   Cardiovascular:      Rate and Rhythm: Normal rate and regular rhythm.      Heart sounds: Normal heart sounds. No murmur heard.  Pulmonary:      Effort: Pulmonary effort is normal.      Breath sounds: Normal breath sounds.   Abdominal:      General: Abdomen is flat.   Musculoskeletal:      Right lower leg: No edema.      Left lower leg: No edema.   Skin:     General: Skin is warm and dry.   Neurological:      General: No focal deficit present.      Mental Status: He is alert and oriented to person, place, and time.   Psychiatric:         Mood and Affect: Mood normal.         Behavior: Behavior  "normal.         Thought Content: Thought content normal.         Judgment: Judgment normal.         Lab Review  Lab Results   Component Value Date/Time    WBC 7.8 05/05/2025 03:04 PM    RBC 5.34 05/05/2025 03:04 PM    HEMOGLOBIN 16.3 05/05/2025 03:04 PM    HEMATOCRIT 48.8 05/05/2025 03:04 PM    MCV 91.4 05/05/2025 03:04 PM    MCH 30.5 05/05/2025 03:04 PM    MCHC 33.4 05/05/2025 03:04 PM    MPV 8.8 (L) 05/05/2025 03:04 PM      Lab Results   Component Value Date/Time    SODIUM 141 05/05/2025 03:04 PM    POTASSIUM 4.2 05/05/2025 03:04 PM    CHLORIDE 103 05/05/2025 03:04 PM    CO2 30 05/05/2025 03:04 PM    GLUCOSE 148 (H) 05/05/2025 03:04 PM    BUN 14 05/05/2025 03:04 PM    CREATININE 0.92 05/05/2025 03:04 PM      Lab Results   Component Value Date/Time    ASTSGOT 35 05/05/2025 03:04 PM    ALTSGPT 40 05/05/2025 03:04 PM     Lab Results   Component Value Date/Time    CHOLSTRLTOT 97 (L) 04/09/2025 02:01 AM    LDL 47 04/09/2025 02:01 AM    HDL 31 (A) 04/09/2025 02:01 AM    TRIGLYCERIDE 93 04/09/2025 02:01 AM    TROPONINT 8 04/11/2025 08:32 AM       No results for input(s): \"NTPROBNP\" in the last 72 hours.    Cardiac Imaging and Procedures Review    Echocardiogram:  pending     Cardiac Catheterization:    5/6/2025  Hemodynamics:   Aorta: 109/68 mmHg  LVEDP: 17 mmHg  No significant pullback gradient across the aortic valve     Coronary Anatomy              Left Main:  30% stenosis in the proximal segment              LAD:  99% stenosis in the ostial segment, proximal 70% stenosis              Ramus: 30% ostial stenosis. The upper branch has proximal 70% stenosis              LCx:  Mid 70% stenosis leading into the OM1. The OM2  is small with ostial 90% stenosis              RCA: Dominant, Diffuse luminal irregularities in the AV groove. The PDA has an ostial 70% stenosis        Assessment/Plan  No new Assessment & Plan notes have been filed under this hospital service since the last note was generated.  Service: " Cardiology      Unstable angina   - cath showed proximal LAD 99% stenosis and LCx  70% stenosis mid. RCA dominant with PDA ostial 70% stenosis   - ECHO ordered and pending   - CTS evaluation pending   - continue asa, rosuvastatin 20mg qd (LDL 47), and metoprolol XL 25mg qd   - check glycohemoglobin     I personally spent a total of 15 minutes which includes face-to-face time and non-face-to-face time spent on preparing to see the patient, reviewing hospital notes and tests, obtaining history from the patient, performing a medically appropriate exam, counseling and educating the patient, ordering medications/tests/procedures/referrals as clinically indicated, and documenting information in the electronic medical record.      Thank you for allowing me to participate in the care of this patient.  I will continue to follow this patient    Please contact me with any questions.    Miriam HO, Cardiology  [facility], [address]  [address] [address]  Phone: [phone]  Fax: [phone]

## 2025-05-06 NOTE — OR NURSING
0920 Arrived from cath lab ID verified. Report received attached to monitors. Right radial TR band clean. Patient fully awake, denies pain, denies nausea. Vss.    0942 A.P.R.N Redet at the bedside talking to patient.     0944 Called and updated family plan of care.     1000 3 cc of air removed from TR band site clean    1015 3 cc of air removed from TR band site clean and soft.     1030 3 cc of air removed from TR band site clean and soft.    1045 3 cc of air removed from TR band site clean and soft.     1100 TR band removed applied gauze and tegaderm clean dry intact and soft. Vss.     1116 Report given to Shimon BRADLEY T702 all questions answered.     1128 Transported to room T702 monitored. Checked surgical site with KIRK gabriel no bleeding no hematoma. Site soft.

## 2025-05-06 NOTE — H&P (VIEW-ONLY)
"  REFERRING PHYSICIAN: Mello Huber MD.     CONSULTING PHYSICIAN: Elayne Valdovinos MD     CHIEF COMPLAINT: Coronary artery disease    HISTORY OF PRESENT ILLNESS: The patient is a 45 y.o. male  with  past medical history significant for hyperlipidemia, exercise induced asthma, fatty liver disease, and obstructive sleep apnea, who was being evaluated in the outpatient setting by cardiology for anginal symptoms that have been occurring for several months, and increasing in frequency as of late. He has actually had trips to ED recently for escalation of symptoms. He underwent a stress test that was compatible with ischemia to the LAD territory, per cardiologist interpretation, as official read was negative. This prompted further work up with a left heart catheterization revealing multivessel coronary artery disease including a quite ominous proximal LAD lesion prompting admission and  consultation of our service.     The patient was seen and evaluated at bedside and endorses the above history. He currently reports no active symptoms. He denies family history of early CAD (grandfather had MI in his 70's, father  of brain cancer). He lives with his wife.    The patient has a \"friend that's like family\" in the room for the entirety of the consultation.    PAST MEDICAL HISTORY:   Active Ambulatory Problems     Diagnosis Date Noted    Rotator cuff disorder, right 10/08/2021    Cervical radiculopathy at C6 10/08/2021    Agatston coronary artery calcium score greater than 400 2025    Exercise-induced asthma 2025    Chest pain 2025    Abnormal EKG 2025    Class 2 obesity without serious comorbidity with body mass index (BMI) of 37.0 to 37.9 in adult 2025    Mixed hyperlipidemia 2025     Resolved Ambulatory Problems     Diagnosis Date Noted    Cervical radiculopathy 2021    Other sprain of right shoulder joint, subsequent encounter 2019    Shoulder pain, right " 07/23/2021    Hypokalemia 04/08/2025    Elevated blood pressure reading 04/08/2025     Past Medical History:   Diagnosis Date    Anesthesia     Asthma     Fatty liver     High cholesterol     Hypertension     Psychiatric problem     Sleep apnea     Snoring        PAST SURGICAL HISTORY:   Past Surgical History:   Procedure Laterality Date    HAND SURGERY      rt hand metal plate broken bone     [id number]      lt knee legament     SHOULDER SURGERY      rt shoulder         ALLERGIES: No Known Allergies     CURRENT MEDICATIONS:   Current Facility-Administered Medications:     aspirin EC tablet 81 mg, 81 mg, Oral, DAILY, Mello Huber M.D., 81 mg at 05/06/25 1234    acetaminophen (Tylenol) tablet 650 mg, 650 mg, Oral, Q6HRS PRN, Redet Michael, A.P.R.N.    senna-docusate (Pericolace Or Senokot S) 8.6-50 MG per tablet 2 Tablet, 2 Tablet, Oral, Q EVENING **AND** polyethylene glycol/lytes (Miralax) Packet 1 Packet, 1 Packet, Oral, QDAY PRN, Redet Michael, A.P.R.N.    hydrALAZINE (Apresoline) injection 10 mg, 10 mg, Intravenous, Q4HRS PRN, Redet Michael, A.P.R.N.    metoprolol SR (Toprol XL) tablet 25 mg, 25 mg, Oral, DAILY, Redet Michael, A.P.R.N., 25 mg at 05/06/25 1233    rosuvastatin (Crestor) tablet 20 mg, 20 mg, Oral, DAILY, Redet Michael, A.P.R.N., 20 mg at 05/06/25 1704    nitroglycerin (Nitrostat) tablet 0.4 mg, 0.4 mg, Sublingual, Q5 MIN PRN, Redet Michael, A.P.R.N.    morphine 4 MG/ML injection 1-5 mg, 1-5 mg, Intravenous, Q HOUR PRN, Redet Michael, A.P.R.N.    FAMILY HISTORY:   Family History   Problem Relation Age of Onset    Diabetes Maternal Uncle     Stroke Maternal Grandmother     Cancer Neg Hx     Ovarian Cancer Neg Hx     Tubal Cancer Neg Hx     Peritoneal Cancer Neg Hx     Colorectal Cancer Neg Hx     Breast Cancer Neg Hx     Heart Disease Neg Hx         SOCIAL HISTORY:   Social History     Socioeconomic History    Marital status:      Spouse name: Not on file    Number of children: Not on file     Years of education: Not on file    Highest education level: Not on file   Occupational History    Not on file   Tobacco Use    Smoking status: Former     Current packs/day: 0.00     Types: Cigarettes     Quit date:      Years since quittin.3    Smokeless tobacco: Former     Types: Chew   Vaping Use    Vaping status: Never Used   Substance and Sexual Activity    Alcohol use: Not Currently    Drug use: No    Sexual activity: Not on file   Other Topics Concern    Not on file   Social History Narrative    Not on file     Social Drivers of Health     Financial Resource Strain: Not on file   Food Insecurity: No Food Insecurity (2025)    Hunger Vital Sign     Worried About Running Out of Food in the Last Year: Never true     Ran Out of Food in the Last Year: Never true   Transportation Needs: No Transportation Needs (2025)    PRAPARE - Transportation     Lack of Transportation (Medical): No     Lack of Transportation (Non-Medical): No   Physical Activity: Not on file   Stress: Not on file   Social Connections: Not on file   Intimate Partner Violence: Not At Risk (2025)    Humiliation, Afraid, Rape, and Kick questionnaire     Fear of Current or Ex-Partner: No     Emotionally Abused: No     Physically Abused: No     Sexually Abused: No   Housing Stability: Unknown (2025)    Housing Stability Vital Sign     Unable to Pay for Housing in the Last Year: Not on file     Number of Times Moved in the Last Year: 0     Homeless in the Last Year: No       REVIEW OF SYSTEMS:  Review of Systems   Constitutional:  Positive for malaise/fatigue. Negative for fever and weight loss.   HENT: Negative.     Eyes: Negative.         Wears glasses   Respiratory:  Negative for cough and shortness of breath.    Cardiovascular:  Positive for chest pain (exertional discomfort previously relieved with rest; now occuring at rest and increasing in severity). Negative for leg swelling.   Gastrointestinal:  Negative for abdominal  "pain and nausea.   Genitourinary: Negative.    Musculoskeletal:  Negative for falls and joint pain.   Skin: Negative.    Neurological:  Negative for dizziness, focal weakness, loss of consciousness and headaches.   Endo/Heme/Allergies: Negative.    Psychiatric/Behavioral:  Negative for depression. The patient is not nervous/anxious.          PHYSICAL EXAMINATION:    /81   Pulse 72   Temp 36.3 °C (97.3 °F) (Temporal)   Resp 17   Ht 1.803 m (5' 11\")   Wt 119 kg (261 lb 7.5 oz)   SpO2 93%   BMI 36.47 kg/m²      Physical Exam  Vitals reviewed.   Constitutional:       General: He is not in acute distress.     Appearance: Normal appearance. He is obese.   HENT:      Head: Normocephalic and atraumatic.      Right Ear: External ear normal.      Left Ear: External ear normal.      Nose: Nose normal. No congestion.   Eyes:      General: No scleral icterus.     Extraocular Movements: Extraocular movements intact.      Conjunctiva/sclera: Conjunctivae normal.   Cardiovascular:      Rate and Rhythm: Normal rate and regular rhythm.   Pulmonary:      Effort: Pulmonary effort is normal. No respiratory distress.   Abdominal:      General: There is no distension.      Palpations: Abdomen is soft.   Musculoskeletal:         General: Normal range of motion.      Cervical back: Normal range of motion.   Skin:     General: Skin is warm and dry.      Coloration: Skin is not jaundiced.      Findings: No rash.   Neurological:      Mental Status: He is alert and oriented to person, place, and time.      Cranial Nerves: No cranial nerve deficit.   Psychiatric:         Mood and Affect: Mood normal.         Behavior: Behavior normal.           LABS REVIEWED:  Lab Results   Component Value Date/Time    SODIUM 141 05/05/2025 03:04 PM    POTASSIUM 4.2 05/05/2025 03:04 PM    CHLORIDE 103 05/05/2025 03:04 PM    CO2 30 05/05/2025 03:04 PM    GLUCOSE 148 (H) 05/05/2025 03:04 PM    BUN 14 05/05/2025 03:04 PM    CREATININE 0.92 05/05/2025 " 03:04 PM      Lab Results   Component Value Date/Time    PROTHROMBTM 15.0 (H) 05/05/2025 03:04 PM    INR 1.17 (H) 05/05/2025 03:04 PM      Lab Results   Component Value Date/Time    WBC 7.8 05/05/2025 03:04 PM    RBC 5.34 05/05/2025 03:04 PM    HEMOGLOBIN 16.3 05/05/2025 03:04 PM    HEMATOCRIT 48.8 05/05/2025 03:04 PM    MCV 91.4 05/05/2025 03:04 PM    MCH 30.5 05/05/2025 03:04 PM    MCHC 33.4 05/05/2025 03:04 PM    MPV 8.8 (L) 05/05/2025 03:04 PM    NEUTSPOLYS 51.30 04/11/2025 08:32 AM    LYMPHOCYTES 35.10 04/11/2025 08:32 AM    MONOCYTES 9.20 04/11/2025 08:32 AM    EOSINOPHILS 3.20 04/11/2025 08:32 AM    BASOPHILS 0.90 04/11/2025 08:32 AM        IMAGING REVIEWED AND INTERPRETED:    ECHOCARDIOGRAM   None    [place] [facility] [place]AR [place]AGING INTERPRETATION  No evidence of significant jeopardized viable myocardium or prior myocardial   infarction.  Normal left ventricular size, ejection fraction, and wall motion.  ECG INTERPRETATION  No ischemic changes with stress compared to baseline ECG.    CARDIAC CATHETER[place] [facility] [place]ings   Hemodynamics:   Aorta: 109/68 mmHg  LVEDP: 17 mmHg  No significant pullback gradient across the aortic valve     Coronary Anatomy              Left Main:  30% stenosis in the proximal segment              LAD:  99% stenosis in the ostial segment, proximal 70% stenosis              Ramus: 30% ostial stenosis. The upper branch has proximal 70% stenosis              LCx:  Mid 70% stenosis leading into the OM1. The OM2  is small with ostial 90% stenosis              RCA: Dominant, Diffuse luminal irregularities in the AV groove. The PDA has an ostial 70% stenosis    CT SCAN CHEST   None      IMPRESSION:  46 yo gentleman with known conditions of  hyperlipidemia, exercise induced asthma, fatty liver disease, obesity (BMI 36.5), and obstructive sleep apnea here with escalating angina symptoms indicating unstable angina. Diagnosed with severe multivessel CAD including a  very significant proximal LAD stenosis.      PLAN:  I recommend CABG x 3-4, left atrial appendage ligation next available inpatient appointment.  The procedure, its risks, benefits, potential complications and alternative treatments were discussed with the patient in detail including the risks should he decide not to undergo my recommended treatment. All of his questions were answered to his satisfaction and he is willing to proceed with the operation. The risks include death, stroke, infection: to include a rare bacterial infection related to the use of the heart/lung machine, hue-operative myocardial infarction, dysrhythmias, diaphragmatic paralysis, chest wall paresthesia, tracheostomy, kidney or other organ failure, possible return to the operating room for bleeding, bleeding requiring transfusion with its attendant risks including AIDS or hepatitis, dehiscence of surgical incisions, respiratory complications including the need for prolonged ventilator support, Protamine or other drug reaction, peripheral neuropathy, loss of limb, and miscount of surgical items. The operative mortality risk is approximately 1%. The STS mortality risk score is 1% and the morbidity and mortality risk score is 10%. The scores were discussed with patient.    Findings and recommendations have been discussed with the patient’s cardiologist, Dr. Huber.  Thank you for this very challenging consultation and participation in the patient’s care.  I will keep you apprised of all future developments.      The operation is scheduled for Thursday May 8th at 7:30 AM at [facility].         Sincerely,       Elayne Valdovinos MD.        I,  Elayne Valdovinos MD performed a substantial portion of the service face-to-face with the same patient on the same date of service. I have reviewed and agree with the care plan and complexity of problems documented by me, Elayne Valdovinos MD and/or Logan Hughes PA-C. I was personally involved in  the medical decision making, including the information below:  reviewing and interpreting the films, conducted elements of the history and physical exam, and provided the plan of care. All medical decision making was made by me.

## 2025-05-07 ENCOUNTER — APPOINTMENT (OUTPATIENT)
Dept: RADIOLOGY | Facility: MEDICAL CENTER | Age: 45
DRG: 234 | End: 2025-05-07
Payer: COMMERCIAL

## 2025-05-07 ENCOUNTER — APPOINTMENT (OUTPATIENT)
Dept: CARDIOLOGY | Facility: MEDICAL CENTER | Age: 45
DRG: 234 | End: 2025-05-07
Attending: NURSE PRACTITIONER
Payer: COMMERCIAL

## 2025-05-07 PROBLEM — E66.9 OBESITY: Status: ACTIVE | Noted: 2025-04-08

## 2025-05-07 LAB
ABO + RH BLD: NORMAL
EKG IMPRESSION: NORMAL
EST. AVERAGE GLUCOSE BLD GHB EST-MCNC: 123 MG/DL
HBA1C MFR BLD: 5.9 % (ref 4–5.6)
LV EJECT FRACT  99904: 55
LV EJECT FRACT MOD 2C 99903: 51.1
LV EJECT FRACT MOD 4C 99902: 57.03
LV EJECT FRACT MOD BP 99901: 53.9

## 2025-05-07 PROCEDURE — 700102 HCHG RX REV CODE 250 W/ 637 OVERRIDE(OP): Performed by: INTERNAL MEDICINE

## 2025-05-07 PROCEDURE — 99254 IP/OBS CNSLTJ NEW/EST MOD 60: CPT | Performed by: STUDENT IN AN ORGANIZED HEALTH CARE EDUCATION/TRAINING PROGRAM

## 2025-05-07 PROCEDURE — 93880 EXTRACRANIAL BILAT STUDY: CPT

## 2025-05-07 PROCEDURE — A9270 NON-COVERED ITEM OR SERVICE: HCPCS | Performed by: INTERNAL MEDICINE

## 2025-05-07 PROCEDURE — 770022 HCHG ROOM/CARE - ICU (200)

## 2025-05-07 PROCEDURE — 86900 BLOOD TYPING SEROLOGIC ABO: CPT

## 2025-05-07 PROCEDURE — 93306 TTE W/DOPPLER COMPLETE: CPT | Mod: 26 | Performed by: INTERNAL MEDICINE

## 2025-05-07 PROCEDURE — 86901 BLOOD TYPING SEROLOGIC RH(D): CPT

## 2025-05-07 PROCEDURE — 83036 HEMOGLOBIN GLYCOSYLATED A1C: CPT

## 2025-05-07 PROCEDURE — 93010 ELECTROCARDIOGRAM REPORT: CPT | Performed by: INTERNAL MEDICINE

## 2025-05-07 PROCEDURE — A9270 NON-COVERED ITEM OR SERVICE: HCPCS | Performed by: NURSE PRACTITIONER

## 2025-05-07 PROCEDURE — 93306 TTE W/DOPPLER COMPLETE: CPT

## 2025-05-07 PROCEDURE — 93005 ELECTROCARDIOGRAM TRACING: CPT | Mod: TC

## 2025-05-07 PROCEDURE — 93970 EXTREMITY STUDY: CPT

## 2025-05-07 PROCEDURE — 700102 HCHG RX REV CODE 250 W/ 637 OVERRIDE(OP): Performed by: NURSE PRACTITIONER

## 2025-05-07 PROCEDURE — 71046 X-RAY EXAM CHEST 2 VIEWS: CPT

## 2025-05-07 RX ORDER — DEXMEDETOMIDINE HYDROCHLORIDE 4 UG/ML
0-1.5 INJECTION, SOLUTION INTRAVENOUS CONTINUOUS
Status: DISCONTINUED | OUTPATIENT
Start: 2025-05-08 | End: 2025-05-08

## 2025-05-07 RX ORDER — METOPROLOL TARTRATE 25 MG/1
12.5 TABLET, FILM COATED ORAL ONCE
Status: DISCONTINUED | OUTPATIENT
Start: 2025-05-08 | End: 2025-05-08

## 2025-05-07 RX ORDER — NOREPINEPHRINE BITARTRATE 0.03 MG/ML
0-1 INJECTION, SOLUTION INTRAVENOUS CONTINUOUS
Status: DISCONTINUED | OUTPATIENT
Start: 2025-05-08 | End: 2025-05-08

## 2025-05-07 RX ORDER — ACETAMINOPHEN 500 MG
1000 TABLET ORAL ONCE
Status: DISCONTINUED | OUTPATIENT
Start: 2025-05-08 | End: 2025-05-08

## 2025-05-07 RX ORDER — METHADONE HYDROCHLORIDE 10 MG/ML
20 INJECTION, SOLUTION INTRAMUSCULAR; INTRAVENOUS; SUBCUTANEOUS ONCE
Status: COMPLETED | OUTPATIENT
Start: 2025-05-08 | End: 2025-05-08

## 2025-05-07 RX ORDER — EPINEPHRINE HCL IN 0.9 % NACL 4MG/250ML
0-.5 PLASTIC BAG, INJECTION (ML) INTRAVENOUS CONTINUOUS
Status: DISCONTINUED | OUTPATIENT
Start: 2025-05-08 | End: 2025-05-08

## 2025-05-07 RX ADMIN — SENNOSIDES AND DOCUSATE SODIUM 2 TABLET: 50; 8.6 TABLET ORAL at 17:00

## 2025-05-07 RX ADMIN — METOPROLOL SUCCINATE 25 MG: 25 TABLET, EXTENDED RELEASE ORAL at 05:27

## 2025-05-07 RX ADMIN — ASPIRIN 81 MG: 81 TABLET, COATED ORAL at 05:27

## 2025-05-07 RX ADMIN — ROSUVASTATIN CALCIUM 20 MG: 20 TABLET, FILM COATED ORAL at 05:27

## 2025-05-07 ASSESSMENT — COGNITIVE AND FUNCTIONAL STATUS - GENERAL
SUGGESTED CMS G CODE MODIFIER MOBILITY: CH
DAILY ACTIVITIY SCORE: 24
SUGGESTED CMS G CODE MODIFIER DAILY ACTIVITY: CH
MOBILITY SCORE: 24
SUGGESTED CMS G CODE MODIFIER DAILY ACTIVITY: CH
DAILY ACTIVITIY SCORE: 24

## 2025-05-07 ASSESSMENT — SOCIAL DETERMINANTS OF HEALTH (SDOH)
IN THE PAST 12 MONTHS, HAS THE ELECTRIC, GAS, OIL, OR WATER COMPANY THREATENED TO SHUT OFF SERVICE IN YOUR HOME?: NO
WITHIN THE PAST 12 MONTHS, THE FOOD YOU BOUGHT JUST DIDN'T LAST AND YOU DIDN'T HAVE MONEY TO GET MORE: NEVER TRUE
WITHIN THE LAST YEAR, HAVE YOU BEEN KICKED, HIT, SLAPPED, OR OTHERWISE PHYSICALLY HURT BY YOUR PARTNER OR EX-PARTNER?: NO
WITHIN THE LAST YEAR, HAVE YOU BEEN HUMILIATED OR EMOTIONALLY ABUSED IN OTHER WAYS BY YOUR PARTNER OR EX-PARTNER?: NO
WITHIN THE LAST YEAR, HAVE TO BEEN RAPED OR FORCED TO HAVE ANY KIND OF SEXUAL ACTIVITY BY YOUR PARTNER OR EX-PARTNER?: NO
WITHIN THE PAST 12 MONTHS, YOU WORRIED THAT YOUR FOOD WOULD RUN OUT BEFORE YOU GOT THE MONEY TO BUY MORE: NEVER TRUE
WITHIN THE LAST YEAR, HAVE YOU BEEN AFRAID OF YOUR PARTNER OR EX-PARTNER?: NO

## 2025-05-07 ASSESSMENT — ENCOUNTER SYMPTOMS
NAUSEA: 0
WEAKNESS: 0
DIZZINESS: 0
FEVER: 0
VOMITING: 0
ABDOMINAL PAIN: 0
HEARTBURN: 0
SORE THROAT: 0
CHILLS: 0

## 2025-05-07 ASSESSMENT — LIFESTYLE VARIABLES
EVER HAD A DRINK FIRST THING IN THE MORNING TO STEADY YOUR NERVES TO GET RID OF A HANGOVER: NO
TOTAL SCORE: 0
AVERAGE NUMBER OF DAYS PER WEEK YOU HAVE A DRINK CONTAINING ALCOHOL: 0
TOTAL SCORE: 0
HOW MANY TIMES IN THE PAST YEAR HAVE YOU HAD 5 OR MORE DRINKS IN A DAY: 0
EVER FELT BAD OR GUILTY ABOUT YOUR DRINKING: NO
ALCOHOL_USE: NO
HAVE PEOPLE ANNOYED YOU BY CRITICIZING YOUR DRINKING: NO
HAVE YOU EVER FELT YOU SHOULD CUT DOWN ON YOUR DRINKING: NO
CONSUMPTION TOTAL: NEGATIVE
ON A TYPICAL DAY WHEN YOU DRINK ALCOHOL HOW MANY DRINKS DO YOU HAVE: 0
DOES PATIENT WANT TO STOP DRINKING: NO
TOTAL SCORE: 0

## 2025-05-07 ASSESSMENT — PAIN DESCRIPTION - PAIN TYPE
TYPE: ACUTE PAIN
TYPE: ACUTE PAIN

## 2025-05-07 ASSESSMENT — FIBROSIS 4 INDEX
FIB4 SCORE: 1.17
FIB4 SCORE: 1.17

## 2025-05-07 ASSESSMENT — PATIENT HEALTH QUESTIONNAIRE - PHQ9
2. FEELING DOWN, DEPRESSED, IRRITABLE, OR HOPELESS: NOT AT ALL
SUM OF ALL RESPONSES TO PHQ9 QUESTIONS 1 AND 2: 0
1. LITTLE INTEREST OR PLEASURE IN DOING THINGS: NOT AT ALL

## 2025-05-07 NOTE — PROGRESS NOTES
Obinna Bryan is a 45 y.o. male admitted 5/6/2025 with medical history of elevated coronary calcium score 2000, obesity BMI 36.4 and chest pain. He was seen by Dr. Huber in the clinic, with angina. Planned for elective LHC.      Underwent elective cardiac catheterization 5/6/2025, showed MVCAD with critical proximal LAD stenosis. CTS consulted and plan for CABG on Thursday 5/8/2025.     Today, patient resting in bed. Denies any chest pain, sob, dizziness or palpitations.     CTS following the patient. Hospitalist consulted for further management     Cardiology will sign off.     Miriam HO, Cardiology  [facility], [address]  [address] [address]  Phone: [phone]  Fax: [phone]

## 2025-05-07 NOTE — PROGRESS NOTES
Monitor Summary  Rhythm: SR  Rate: 62-76  Ectopy: 0  Measurements: .20/.08/.42  ---12 hr Chart---

## 2025-05-07 NOTE — PROGRESS NOTES
Bedside report received and patient care assumed. Pt is resting in bed, A&O4, with no complaints of pain, and is on roomair. Tele box on. All fall precautions are in place, belongings at bedside table.  Pt was updated on POC, no questions or concerns. Pt educated on use of call light for assistance.

## 2025-05-07 NOTE — CARE PLAN
The patient is Stable - Low risk of patient condition declining or worsening    Shift Goals  Clinical Goals: pain management, safety, VSS   Patient Goals: rest  Family Goals: satinder    Progress made toward(s) clinical / shift goals:    Problem: Knowledge Deficit - Standard  Goal: Patient and family/care givers will demonstrate understanding of plan of care, disease process/condition, diagnostic tests and medications  Description: Target End Date:  1-3 days or as soon as patient condition allowsDocument in Patient Education1.  Patient and family/caregiver oriented to unit, equipment, visitation policy and means for communicating concern2.  Complete/review Learning Assessment3.  Assess knowledge level of disease process/condition, treatment plan, diagnostic tests and medications4.  Explain disease process/condition, treatment plan, diagnostic tests and medications  Outcome: Progressing     Problem: Communication  Goal: The ability to communicate needs accurately and effectively will improve  Description: Target End Date:  End of day 11.  Assess ability to communicate and understand2.  Provide augmentative or alternative methods of communication devices3.  Use /language line as appropriate4.  Collaborate with Speech Therapy as needed  Outcome: Progressing     Problem: Hemodynamics  Goal: Patient's hemodynamics, fluid balance and neurologic status will be stable or improve  Description: Target End Date:  Prior to discharge or change in level of careDocument on Assessment and I/O flowsheet templates1.  Monitor vital signs, pulse oximetry and cardiac monitor per provider order and/or policy2.  Maintain blood pressure per provider order3.  Hemodynamic monitoring per provider order4.  Manage IV fluids and IV infusions5.  Monitor intake and output6.  Daily weights per unit policy or provider order7.  Assess peripheral pulses and capillary refill8.  Assess color and body temperature9.  Position patient for maximum  circulation/cardiac [id number]. Monitor for signs/symptoms of excessive qohyctid03. Assess mental status, restlessness and changes in level of [id number]. Monitor temperature and report fever or hypothermia to provider immediately. Consideration of targeted temperature management.  Outcome: Progressing     Problem: Safety  Goal: Will remain free from injury  Outcome: Progressing  Goal: Will remain free from falls  Outcome: Progressing

## 2025-05-07 NOTE — PROGRESS NOTES
4 Eyes Skin Assessment Completed by KIRK Robins and SARAHI Alcaraz.    Head WDL  Ears WDL  Nose WDL  Mouth WDL  Neck WDL  Breast/Chest WDL  Shoulder Blades WDL  Spine WDL  (R) Arm/Elbow/Hand WDL  (L) Arm/Elbow/Hand WDL  Abdomen WDL  Groin WDL  Scrotum/Coccyx/Buttocks WDL  (R) Leg WDL  (L) Leg WDL  (R) Heel/Foot/Toe WDL  (L) Heel/Foot/Toe WDL          Devices In Places Tele Box, Blood Pressure Cuff, and Pulse Ox      Interventions In Place Pillows    Possible Skin Injury No    Pictures Uploaded Into Epic N/A  Wound Consult Placed N/A  RN Wound Prevention Protocol Ordered No

## 2025-05-07 NOTE — DISCHARGE PLANNING
Case Management Discharge Planning    Admission Date: 5/6/2025  GMLOS: 2.1  ALOS: 1    6-Clicks ADL Score:    6-Clicks Mobility Score:        Anticipated Discharge Dispo: Discharge Disposition: Discharged to home/self care (01)  Discharge Address: [address] [place] [address] [address] [id number]    DME Needed: No    Action(s) Taken: Updated Provider/Nurse on Discharge Plan and DC Assessment Complete (See below)    Escalations Completed: None    Medically Clear: No    Next Steps: Planning for discharge to home with spouse when medically cleared. (Surgery planned for 5/8/2024.)    Barriers to Discharge: Medical clearance    Is the patient up for discharge tomorrow: No

## 2025-05-07 NOTE — DISCHARGE PLANNING
Worker's Comp CMDayna visited 5/6 and provided contact information / fax . She will assist with DCP needs

## 2025-05-07 NOTE — DISCHARGE PLANNING
In the case of an emergency, pt's legal NOK is spouse, Crystal Hume     RNCM met with pt at bedside and obtained the information used in this assessment. Pt verified accuracy of facesheet. Pt lives in a 2 story home with spouse and 14 and 16 you step children  Prior to current hospitalization, pt was completely independent in ADLS/IADLS. Pt drives and is able to attend necessary MD appointments. Pt has a good support system. Pt deniesany hx of substance use and does receive talk therapy for PTSD. Owns no DME.      Care Transition Team Assessment    Information Source:pt  Orientation Level: Oriented X4  Information Given By: Patient  Informant's Name: Obinna  Who is responsible for making decisions for patient? : Patient         Elopement Risk  Legal Hold: No  Ambulatory or Self Mobile in Wheelchair: Yes  Disoriented: No  Psychiatric Symptoms: None  History of Wandering: No  Elopement this Admit: No  Vocalizing Wanting to Leave: No  Displays Behaviors, Body Language Wanting to Leave: No-Not at Risk for Elopement  Elopement Risk: Not at Risk for Elopement    Interdisciplinary Discharge Planning  Does Admitting Nurse Feel This Could be a Complex Discharge?: No  Primary Care Physician: Doris Orozco  Lives with - Patient's Self Care Capacity: Spouse  Support Systems: Family Member(s), Spouse / Significant Other  Housing / Facility: 2 Story House  Mobility Issues: No  Prior Services: None    Discharge Preparedness  What is your plan after discharge?: Home with help  What are your discharge supports?: Spouse  Prior Functional Level: Ambulatory, Drives Self, Independent with Activities of Daily Living, Independent with Medication Management  Difficulity with ADLs: None  Difficulity with IADLs: None    Functional Assesment  Prior Functional Level: Ambulatory, Drives Self, Independent with Activities of Daily Living, Independent with Medication Management    Finances  Prescription Coverage: Yes    Vision /  Hearing Impairment  Right Eye Vision: Impaired, Wears Glasses  Left Eye Vision: Impaired, Wears Glasses              Domestic Abuse  Physical Abuse or Sexual Abuse: No  Verbal Abuse or Emotional Abuse: No    Psychological Assessment  History of Substance Abuse: None  History of Psychiatric Problems: Yes  Non-compliant with Treatment: No         Anticipated Discharge Information  Discharge Disposition: Discharged to home/self care (01)  Discharge Address: [address] [place] [address] [address] [id number]

## 2025-05-07 NOTE — CONSULTS
Hospital Medicine Consultation    Date of Service  5/7/2025    Referring Physician  Mello Huber M.D.    Consulting Physician  Houston Faria M.D.    Reason for Consultation  Close monitoring on tele     History of Presenting Illness  45 y.o. male who presented 5/6/2025 with with medical history of elevated coronary calcium score 2000, obesity BMI 36.4, hyperlipidemia, exercise induced asthma, fatty liver disease, and obstructive sleep apnea  and chest pain    Patient mentioned that he has been having on and off chest pain for about 1 years and though he was it was related to his indigestion.  Patient was seen by Dr. Huber in the clinic, with angina. Patient  Underwent elective cardiac catheterization 5/6/2025, showed MVCAD with critical proximal LAD stenosis. CTS consulted and plan for CABG on Thursday 5/8/2025. I was requested to monitor patient on tele. Upon my evaluation patient was complaining of some chest discomfort. Denies left arm of jaw pain. No nausea, vomiting, diaphoresis.     Review of Systems  Review of Systems   Constitutional:  Negative for chills and fever.   HENT:  Negative for congestion and sore throat.    Cardiovascular:  Positive for chest pain. Negative for leg swelling.   Gastrointestinal:  Negative for abdominal pain, heartburn, nausea and vomiting.   Genitourinary:  Negative for dysuria and hematuria.   Neurological:  Negative for dizziness and weakness.       Past Medical History   has a past medical history of Anesthesia, Asthma, Fatty liver, High cholesterol, Hypertension, Psychiatric problem, Sleep apnea, and Snoring.    Surgical History   has a past surgical history that includes hand surgery; [id number]; and shoulder surgery.    Family History  family history includes Diabetes in his maternal uncle; Stroke in his maternal grandmother.    Social History   reports that he quit smoking about 37 years ago. His smoking use included cigarettes. He has quit using smokeless tobacco.  His  smokeless tobacco use included chew. He reports that he does not currently use alcohol. He reports that he does not use drugs.    Medications  Prior to Admission Medications   Prescriptions Last Dose Informant Patient Reported? Taking?   albuterol 108 (90 Base) MCG/ACT Aero Soln inhalation aerosol  Patient No No   Sig: Inhale 2 Puffs every four hours as needed for Shortness of Breath.   aspirin 81 MG EC tablet 5/5/2025 Bedtime Patient Yes Yes   Sig: Take 81 mg by mouth every day.   metoprolol SR (TOPROL XL) 25 MG TABLET SR 24 HR 5/5/2025 Bedtime Patient No Yes   Sig: Take 1 Tablet by mouth every day.   rosuvastatin (CRESTOR) 20 MG Tab 5/5/2025 Bedtime Patient No Yes   Sig: Take 1 Tablet by mouth every day.      Facility-Administered Medications: None       Allergies  No Known Allergies    Physical Exam  Temp:  [36.1 °C (97 °F)-36.7 °C (98.1 °F)] 36.7 °C (98.1 °F)  Pulse:  [61-70] 67  Resp:  [17-19] 18  BP: (118-147)/(67-88) 126/84  SpO2:  [90 %-95 %] 95 %    Physical Exam  Constitutional:       Appearance: He is obese.   HENT:      Head: Normocephalic.      Nose: Nose normal.      Mouth/Throat:      Mouth: Mucous membranes are moist.   Cardiovascular:      Rate and Rhythm: Normal rate and regular rhythm.   Pulmonary:      Effort: Pulmonary effort is normal.      Breath sounds: Normal breath sounds.   Abdominal:      General: Abdomen is flat.   Musculoskeletal:         General: Normal range of motion.   Skin:     General: Skin is warm.   Neurological:      General: No focal deficit present.      Mental Status: He is alert and oriented to person, place, and time.         Fluids  Date 05/07/25 0700 - 05/08/25 0659   Shift 7756-9756 2075-8115 0074-8854 24 Hour Total   INTAKE   P.O. 240   240   Shift Total 240   240   OUTPUT   Shift Total       Weight (kg) 117.4 117.4 117.4 117.4       Laboratory  Recent Labs     05/05/25  1504   WBC 7.8   RBC 5.34   HEMOGLOBIN 16.3   HEMATOCRIT 48.8   MCV 91.4   MCH 30.5   MCHC 33.4    RDW 42.7   PLATELETCT 218   MPV 8.8*     Recent Labs     05/05/25  1504   SODIUM 141   POTASSIUM 4.2   CHLORIDE 103   CO2 30   GLUCOSE 148*   BUN 14   CREATININE 0.92   CALCIUM 9.6     Recent Labs     05/05/25  1504   APTT 32.0   INR 1.17*                 Imaging  EC-ECHOCARDIOGRAM COMPLETE W/O CONT   Final Result      US-VEIN MAPPING LOWER EXTREMITY BILAT   Final Result      US-CAROTID DOPPLER BILAT   Final Result      CL-LEFT HEART CATHETERIZATION WITH POSSIBLE INTERVENTION    (Results Pending)   EC-FABIO W/O CONT    (Results Pending)   DX-CHEST-2 VIEWS    (Results Pending)         Assessment/Plan  * CAD in native artery- (present on admission)  Assessment & Plan  On and off chest pain for almost 1 year and though it was indigestion   elevated coronary calcium score 2000  BMI 36  Underwent elective cardiac catheterization 5/6/2025, showed MVCAD with critical proximal LAD stenosis.   CTS consulted and plan for CABG on Thursday 5/8/2025.   I was requested to monitor patient on tele  NPO at midnight       Mixed hyperlipidemia- (present on admission)  Assessment & Plan  Continue statin     Obesity- (present on admission)  Assessment & Plan  BMI 36  Weight loss counseling

## 2025-05-07 NOTE — DIETARY
Nutrition Services: Heart Healthy Diet  Education Consult   Day 1 of admit.  Obinna Bryan is a 45 y.o. male with admitting DX of Abnormal result of other cardiovascular function study [R94.39]  CAD in native artery [I25.10]    RD visited the patient at bedside to provide education on a heart-healthy diet. The RD reinforced key topics, including tips for choosing heart-healthy carbohydrates and strategies for reducing sodium intake. Handout was provided to support the topics discussed. The patient demonstrated readiness to learn and showed strong evidence of understanding. RD addressed all of the patient's questions to their satisfaction.    No other education needs identified at this time. Consider referral to outpatient nutrition services for continuation of education as indicated or per pt preferences.     Please re-consult RD as indicated.

## 2025-05-07 NOTE — THERAPY
Physical Therapy Contact Note    Patient Name: Obinna Bryan  Age:  45 y.o., Sex:  male  Medical Record #: [id number]  Today's Date: 5/7/2025    PT eval order received. Plan for CABG 5/8. PT to hold at this time and check back as appropriate post-op for evaluation.

## 2025-05-07 NOTE — ASSESSMENT & PLAN NOTE
On and off chest pain for almost 1 year and though it was indigestion   elevated coronary calcium score 2000  BMI 36  Underwent elective cardiac catheterization 5/6/2025, showed MVCAD with critical proximal LAD stenosis.   CTS consulted and plan for CABG on Thursday 5/8/2025.   I was requested to monitor patient on tele  NPO at midnight

## 2025-05-08 ENCOUNTER — ANESTHESIA EVENT (OUTPATIENT)
Dept: SURGERY | Facility: MEDICAL CENTER | Age: 45
DRG: 234 | End: 2025-05-08
Payer: COMMERCIAL

## 2025-05-08 ENCOUNTER — ANESTHESIA (OUTPATIENT)
Dept: SURGERY | Facility: MEDICAL CENTER | Age: 45
DRG: 234 | End: 2025-05-08
Payer: COMMERCIAL

## 2025-05-08 ENCOUNTER — APPOINTMENT (OUTPATIENT)
Dept: CARDIOLOGY | Facility: MEDICAL CENTER | Age: 45
DRG: 234 | End: 2025-05-08
Payer: COMMERCIAL

## 2025-05-08 ENCOUNTER — APPOINTMENT (OUTPATIENT)
Dept: RADIOLOGY | Facility: MEDICAL CENTER | Age: 45
DRG: 234 | End: 2025-05-08
Attending: THORACIC SURGERY (CARDIOTHORACIC VASCULAR SURGERY)
Payer: COMMERCIAL

## 2025-05-08 PROBLEM — Z99.11 ENCOUNTER FOR WEANING FROM VENTILATOR (HCC): Status: ACTIVE | Noted: 2025-05-08

## 2025-05-08 PROBLEM — G47.33 OBSTRUCTIVE SLEEP APNEA: Status: ACTIVE | Noted: 2025-05-08

## 2025-05-08 PROBLEM — J45.909 ASTHMA: Status: ACTIVE | Noted: 2025-05-08

## 2025-05-08 LAB
ABO GROUP BLD: NORMAL
ACT BLD: 124 SEC (ref 74–137)
ACT BLD: 141 SEC (ref 74–137)
ACT BLD: 504 SEC (ref 74–137)
ACT BLD: 654 SEC (ref 74–137)
ACT BLD: 855 SEC (ref 74–137)
ACT BLD: 878 SEC (ref 74–137)
ACT BLD: >1000 SEC (ref 74–137)
ANION GAP SERPL CALC-SCNC: 11 MMOL/L (ref 7–16)
APTT PPP: 53.2 SEC (ref 24.7–36)
ARTERIAL PATENCY WRIST A: ABNORMAL
BASE EXCESS BLDA CALC-SCNC: -1 MMOL/L (ref -4–3)
BASE EXCESS BLDA CALC-SCNC: -1 MMOL/L (ref -4–3)
BASE EXCESS BLDA CALC-SCNC: -2 MMOL/L (ref -4–3)
BASE EXCESS BLDA CALC-SCNC: -3 MMOL/L (ref -4–3)
BASE EXCESS BLDA CALC-SCNC: -4 MMOL/L (ref -4–3)
BASE EXCESS BLDA CALC-SCNC: -5 MMOL/L (ref -4–3)
BASE EXCESS BLDV CALC-SCNC: 1 MMOL/L (ref -2–3)
BLD GP AB SCN SERPL QL: NORMAL
BODY TEMPERATURE: ABNORMAL DEGREES
BODY TEMPERATURE: NORMAL DEGREES
BUN SERPL-MCNC: 16 MG/DL (ref 8–22)
CA-I BLD ISE-SCNC: 0.98 MMOL/L (ref 1.1–1.3)
CA-I BLD ISE-SCNC: 0.99 MMOL/L (ref 1.1–1.3)
CA-I BLD ISE-SCNC: 1.02 MMOL/L (ref 1.1–1.3)
CA-I BLD ISE-SCNC: 1.2 MMOL/L (ref 1.1–1.3)
CA-I BLD ISE-SCNC: 1.23 MMOL/L (ref 1.1–1.3)
CA-I BLD ISE-SCNC: 1.25 MMOL/L (ref 1.1–1.3)
CA-I BLD ISE-SCNC: 1.29 MMOL/L (ref 1.1–1.3)
CA-I SERPL-SCNC: 1.1 MMOL/L (ref 1.1–1.3)
CALCIUM SERPL-MCNC: 9.7 MG/DL (ref 8.5–10.5)
CHLORIDE SERPL-SCNC: 104 MMOL/L (ref 96–112)
CO2 BLDA-SCNC: 22 MMOL/L (ref 32–48)
CO2 BLDA-SCNC: 23 MMOL/L (ref 32–48)
CO2 BLDA-SCNC: 23 MMOL/L (ref 32–48)
CO2 BLDA-SCNC: 24 MMOL/L (ref 32–48)
CO2 BLDA-SCNC: 24 MMOL/L (ref 32–48)
CO2 BLDA-SCNC: 25 MMOL/L (ref 32–48)
CO2 BLDA-SCNC: 26 MMOL/L (ref 32–48)
CO2 BLDA-SCNC: 26 MMOL/L (ref 32–48)
CO2 BLDA-SCNC: 27 MMOL/L (ref 32–48)
CO2 BLDA-SCNC: 29 MMOL/L (ref 32–48)
CO2 BLDV-SCNC: 29 MMOL/L (ref 20–33)
CO2 SERPL-SCNC: 23 MMOL/L (ref 20–33)
CREAT SERPL-MCNC: 0.99 MG/DL (ref 0.5–1.4)
DELSYS IDSYS: ABNORMAL
EKG IMPRESSION: NORMAL
END TIDAL CARBON DIOXIDE IECO2: 33 MMHG
END TIDAL CARBON DIOXIDE IECO2: 33 MMHG
END TIDAL CARBON DIOXIDE IECO2: 35 MMHG
END TIDAL CARBON DIOXIDE IECO2: 36 MMHG
END TIDAL CARBON DIOXIDE IECO2: 37 MMHG
GFR SERPLBLD CREATININE-BSD FMLA CKD-EPI: 95 ML/MIN/1.73 M 2
GLUCOSE BLD STRIP.AUTO-MCNC: 109 MG/DL (ref 65–99)
GLUCOSE SERPL-MCNC: 114 MG/DL (ref 65–99)
HCO3 BLDA-SCNC: 20.7 MMOL/L (ref 21–28)
HCO3 BLDA-SCNC: 21.9 MMOL/L (ref 21–28)
HCO3 BLDA-SCNC: 22 MMOL/L (ref 21–28)
HCO3 BLDA-SCNC: 22.1 MMOL/L (ref 21–28)
HCO3 BLDA-SCNC: 22.7 MMOL/L (ref 21–28)
HCO3 BLDA-SCNC: 24 MMOL/L (ref 21–28)
HCO3 BLDA-SCNC: 24.4 MMOL/L (ref 21–28)
HCO3 BLDA-SCNC: 25.1 MMOL/L (ref 21–28)
HCO3 BLDA-SCNC: 25.5 MMOL/L (ref 21–28)
HCO3 BLDA-SCNC: 27.3 MMOL/L (ref 21–28)
HCO3 BLDV-SCNC: 27.7 MMOL/L (ref 22–29)
HCT VFR BLD AUTO: 40.9 % (ref 42–52)
HCT VFR BLD CALC: 36 % (ref 42–52)
HCT VFR BLD CALC: 36 % (ref 42–52)
HCT VFR BLD CALC: 38 % (ref 42–52)
HCT VFR BLD CALC: 40 % (ref 42–52)
HCT VFR BLD CALC: 47 % (ref 42–52)
HCT VFR BLD CALC: 51 % (ref 42–52)
HGB BLD-MCNC: 12.2 G/DL (ref 14–18)
HGB BLD-MCNC: 12.2 G/DL (ref 14–18)
HGB BLD-MCNC: 12.9 G/DL (ref 14–18)
HGB BLD-MCNC: 13.6 G/DL (ref 14–18)
HGB BLD-MCNC: 13.9 G/DL (ref 14–18)
HGB BLD-MCNC: 16 G/DL (ref 14–18)
HGB BLD-MCNC: 17.3 G/DL (ref 14–18)
HOROWITZ INDEX BLDA+IHG-RTO: 281 MM[HG]
HOROWITZ INDEX BLDA+IHG-RTO: 310 MM[HG]
HOROWITZ INDEX BLDA+IHG-RTO: 340 MM[HG]
HOROWITZ INDEX BLDA+IHG-RTO: 350 MM[HG]
HOROWITZ INDEX BLDA+IHG-RTO: 367 MM[HG]
INR PPP: 1.52 (ref 0.87–1.13)
LACTATE BLD-SCNC: 1.1 MMOL/L (ref 0.5–2)
LACTATE BLD-SCNC: 1.2 MMOL/L (ref 0.5–2)
LACTATE BLD-SCNC: 1.3 MMOL/L (ref 0.5–2)
LACTATE BLD-SCNC: 1.7 MMOL/L (ref 0.5–2)
MAGNESIUM SERPL-MCNC: 2.7 MG/DL (ref 1.5–2.5)
MODE IMODE: ABNORMAL
O2/TOTAL GAS SETTING VFR VENT: 100 %
O2/TOTAL GAS SETTING VFR VENT: 30 %
O2/TOTAL GAS SETTING VFR VENT: 50 %
PCO2 BLDA: 32.9 MMHG (ref 32–48)
PCO2 BLDA: 35.2 MMHG (ref 32–48)
PCO2 BLDA: 35.8 MMHG (ref 32–48)
PCO2 BLDA: 39.8 MMHG (ref 32–48)
PCO2 BLDA: 44.7 MMHG (ref 32–48)
PCO2 BLDA: 45.4 MMHG (ref 32–48)
PCO2 BLDA: 45.7 MMHG (ref 32–48)
PCO2 BLDA: 48.3 MMHG (ref 32–48)
PCO2 BLDA: 55 MMHG (ref 32–48)
PCO2 BLDA: 61.2 MMHG (ref 32–48)
PCO2 BLDV: 49.7 MMHG (ref 38–54)
PCO2 TEMP ADJ BLDA: 32 MMHG (ref 32–48)
PCO2 TEMP ADJ BLDA: 33.5 MMHG (ref 32–48)
PCO2 TEMP ADJ BLDA: 35.4 MMHG (ref 32–48)
PCO2 TEMP ADJ BLDA: 40.1 MMHG (ref 32–48)
PCO2 TEMP ADJ BLDA: 40.3 MMHG (ref 32–48)
PCO2 TEMP ADJ BLDA: 43.5 MMHG (ref 32–48)
PCO2 TEMP ADJ BLDA: 43.6 MMHG (ref 32–48)
PCO2 TEMP ADJ BLDA: 47.3 MMHG (ref 32–48)
PCO2 TEMP ADJ BLDA: 52.6 MMHG (ref 32–48)
PCO2 TEMP ADJ BLDA: 58.6 MMHG (ref 32–48)
PCO2 TEMP ADJ BLDV: 45.1 MMHG (ref 38–54)
PEEP END EXPIRATORY PRESSURE IPEEP: 8 CMH20
PERCENT MINUTE VOLUME IPMV: 100
PERCENT MINUTE VOLUME IPMV: 130
PERCENT MINUTE VOLUME IPMV: 160
PERCENT MINUTE VOLUME IPMV: 160
PH BLDA: 7.26 [PH] (ref 7.35–7.45)
PH BLDA: 7.27 [PH] (ref 7.35–7.45)
PH BLDA: 7.27 [PH] (ref 7.35–7.45)
PH BLDA: 7.31 [PH] (ref 7.35–7.45)
PH BLDA: 7.34 [PH] (ref 7.35–7.45)
PH BLDA: 7.35 [PH] (ref 7.35–7.45)
PH BLDA: 7.39 [PH] (ref 7.35–7.45)
PH BLDA: 7.4 [PH] (ref 7.35–7.45)
PH BLDA: 7.4 [PH] (ref 7.35–7.45)
PH BLDA: 7.41 [PH] (ref 7.35–7.45)
PH BLDV: 7.35 [PH] (ref 7.31–7.45)
PH TEMP ADJ BLDA: 7.27 [PH] (ref 7.35–7.45)
PH TEMP ADJ BLDA: 7.28 [PH] (ref 7.35–7.45)
PH TEMP ADJ BLDA: 7.29 [PH] (ref 7.35–7.45)
PH TEMP ADJ BLDA: 7.32 [PH] (ref 7.35–7.45)
PH TEMP ADJ BLDA: 7.36 [PH] (ref 7.35–7.45)
PH TEMP ADJ BLDA: 7.38 [PH] (ref 7.35–7.45)
PH TEMP ADJ BLDA: 7.38 [PH] (ref 7.35–7.45)
PH TEMP ADJ BLDA: 7.4 [PH] (ref 7.35–7.45)
PH TEMP ADJ BLDA: 7.42 [PH] (ref 7.35–7.45)
PH TEMP ADJ BLDA: 7.42 [PH] (ref 7.35–7.45)
PH TEMP ADJ BLDV: 7.39 [PH] (ref 7.31–7.45)
PLATELET # BLD AUTO: 146 K/UL (ref 164–446)
PO2 BLDA: 102 MMHG (ref 83–108)
PO2 BLDA: 105 MMHG (ref 83–108)
PO2 BLDA: 110 MMHG (ref 83–108)
PO2 BLDA: 126 MMHG (ref 83–108)
PO2 BLDA: 147 MMHG (ref 83–108)
PO2 BLDA: 155 MMHG (ref 83–108)
PO2 BLDA: 214 MMHG (ref 83–108)
PO2 BLDA: 281 MMHG (ref 83–108)
PO2 BLDA: 321 MMHG (ref 83–108)
PO2 BLDA: 85 MMHG (ref 83–108)
PO2 BLDV: 42 MMHG (ref 23–48)
PO2 TEMP ADJ BLDA: 104 MMHG (ref 83–108)
PO2 TEMP ADJ BLDA: 104 MMHG (ref 83–108)
PO2 TEMP ADJ BLDA: 105 MMHG (ref 83–108)
PO2 TEMP ADJ BLDA: 120 MMHG (ref 83–108)
PO2 TEMP ADJ BLDA: 141 MMHG (ref 83–108)
PO2 TEMP ADJ BLDA: 148 MMHG (ref 83–108)
PO2 TEMP ADJ BLDA: 202 MMHG (ref 83–108)
PO2 TEMP ADJ BLDA: 277 MMHG (ref 83–108)
PO2 TEMP ADJ BLDA: 316 MMHG (ref 83–108)
PO2 TEMP ADJ BLDA: 82 MMHG (ref 83–108)
PO2 TEMP ADJ BLDV: 36 MMHG (ref 23–48)
POTASSIUM BLD-SCNC: 4.2 MMOL/L (ref 3.6–5.5)
POTASSIUM BLD-SCNC: 4.5 MMOL/L (ref 3.6–5.5)
POTASSIUM BLD-SCNC: 5 MMOL/L (ref 3.6–5.5)
POTASSIUM BLD-SCNC: 5.5 MMOL/L (ref 3.6–5.5)
POTASSIUM BLD-SCNC: 6.6 MMOL/L (ref 3.6–5.5)
POTASSIUM BLD-SCNC: 6.8 MMOL/L (ref 3.6–5.5)
POTASSIUM BLD-SCNC: 7.1 MMOL/L (ref 3.6–5.5)
POTASSIUM SERPL-SCNC: 4.2 MMOL/L (ref 3.6–5.5)
POTASSIUM SERPL-SCNC: 4.3 MMOL/L (ref 3.6–5.5)
POTASSIUM SERPL-SCNC: 4.3 MMOL/L (ref 3.6–5.5)
POTASSIUM SERPL-SCNC: 4.6 MMOL/L (ref 3.6–5.5)
PRESSURE SUPPORT SETTING VENT: 5 CM[H2O]
PROTHROMBIN TIME: 18.4 SEC (ref 12–14.6)
RH BLD: NORMAL
SAO2 % BLDA: 100 % (ref 93–99)
SAO2 % BLDA: 95 % (ref 93–99)
SAO2 % BLDA: 98 % (ref 93–99)
SAO2 % BLDA: 99 % (ref 93–99)
SAO2 % BLDA: 99 % (ref 93–99)
SAO2 % BLDV: 74 % (ref 60–85)
SODIUM BLD-SCNC: 134 MMOL/L (ref 135–145)
SODIUM BLD-SCNC: 135 MMOL/L (ref 135–145)
SODIUM BLD-SCNC: 138 MMOL/L (ref 135–145)
SODIUM BLD-SCNC: 138 MMOL/L (ref 135–145)
SODIUM BLD-SCNC: 140 MMOL/L (ref 135–145)
SODIUM BLD-SCNC: 140 MMOL/L (ref 135–145)
SODIUM BLD-SCNC: 141 MMOL/L (ref 135–145)
SODIUM SERPL-SCNC: 138 MMOL/L (ref 135–145)
SPECIMEN DRAWN FROM PATIENT: ABNORMAL
SPECIMEN DRAWN FROM PATIENT: NORMAL

## 2025-05-08 PROCEDURE — 700105 HCHG RX REV CODE 258

## 2025-05-08 PROCEDURE — 82330 ASSAY OF CALCIUM: CPT | Mod: 91

## 2025-05-08 PROCEDURE — 700105 HCHG RX REV CODE 258: Performed by: NURSE PRACTITIONER

## 2025-05-08 PROCEDURE — A9270 NON-COVERED ITEM OR SERVICE: HCPCS | Performed by: NURSE PRACTITIONER

## 2025-05-08 PROCEDURE — C9248 INJ, CLEVIDIPINE BUTYRATE: HCPCS | Mod: JZ | Performed by: NURSE PRACTITIONER

## 2025-05-08 PROCEDURE — 94150 VITAL CAPACITY TEST: CPT

## 2025-05-08 PROCEDURE — 33508 ENDOSCOPIC VEIN HARVEST: CPT | Mod: 59 | Performed by: THORACIC SURGERY (CARDIOTHORACIC VASCULAR SURGERY)

## 2025-05-08 PROCEDURE — 71045 X-RAY EXAM CHEST 1 VIEW: CPT

## 2025-05-08 PROCEDURE — 33533 CABG ARTERIAL SINGLE: CPT | Performed by: THORACIC SURGERY (CARDIOTHORACIC VASCULAR SURGERY)

## 2025-05-08 PROCEDURE — 85018 HEMOGLOBIN: CPT

## 2025-05-08 PROCEDURE — 84132 ASSAY OF SERUM POTASSIUM: CPT | Mod: 91

## 2025-05-08 PROCEDURE — 33519 CABG ARTERY-VEIN THREE: CPT | Mod: AS | Performed by: NURSE PRACTITIONER

## 2025-05-08 PROCEDURE — 110371 HCHG SHELL REV 272: Performed by: THORACIC SURGERY (CARDIOTHORACIC VASCULAR SURGERY)

## 2025-05-08 PROCEDURE — 021209W BYPASS CORONARY ARTERY, THREE ARTERIES FROM AORTA WITH AUTOLOGOUS VENOUS TISSUE, OPEN APPROACH: ICD-10-PCS | Performed by: THORACIC SURGERY (CARDIOTHORACIC VASCULAR SURGERY)

## 2025-05-08 PROCEDURE — 160031 HCHG SURGERY MINUTES - 1ST 30 MINS LEVEL 5: Performed by: THORACIC SURGERY (CARDIOTHORACIC VASCULAR SURGERY)

## 2025-05-08 PROCEDURE — 160048 HCHG OR STATISTICAL LEVEL 1-5: Performed by: THORACIC SURGERY (CARDIOTHORACIC VASCULAR SURGERY)

## 2025-05-08 PROCEDURE — 5A1221Z PERFORMANCE OF CARDIAC OUTPUT, CONTINUOUS: ICD-10-PCS | Performed by: THORACIC SURGERY (CARDIOTHORACIC VASCULAR SURGERY)

## 2025-05-08 PROCEDURE — 83735 ASSAY OF MAGNESIUM: CPT

## 2025-05-08 PROCEDURE — 83605 ASSAY OF LACTIC ACID: CPT | Mod: 91

## 2025-05-08 PROCEDURE — 700102 HCHG RX REV CODE 250 W/ 637 OVERRIDE(OP): Performed by: STUDENT IN AN ORGANIZED HEALTH CARE EDUCATION/TRAINING PROGRAM

## 2025-05-08 PROCEDURE — 770022 HCHG ROOM/CARE - ICU (200)

## 2025-05-08 PROCEDURE — 700101 HCHG RX REV CODE 250

## 2025-05-08 PROCEDURE — 84295 ASSAY OF SERUM SODIUM: CPT | Mod: 91

## 2025-05-08 PROCEDURE — 37799 UNLISTED PX VASCULAR SURGERY: CPT

## 2025-05-08 PROCEDURE — 80048 BASIC METABOLIC PNL TOTAL CA: CPT

## 2025-05-08 PROCEDURE — 82962 GLUCOSE BLOOD TEST: CPT | Mod: 91

## 2025-05-08 PROCEDURE — 160015 HCHG STAT PREOP MINUTES: Performed by: THORACIC SURGERY (CARDIOTHORACIC VASCULAR SURGERY)

## 2025-05-08 PROCEDURE — 85610 PROTHROMBIN TIME: CPT

## 2025-05-08 PROCEDURE — 02L70CK OCCLUSION OF LEFT ATRIAL APPENDAGE WITH EXTRALUMINAL DEVICE, OPEN APPROACH: ICD-10-PCS | Performed by: THORACIC SURGERY (CARDIOTHORACIC VASCULAR SURGERY)

## 2025-05-08 PROCEDURE — 06BP4ZZ EXCISION OF RIGHT SAPHENOUS VEIN, PERCUTANEOUS ENDOSCOPIC APPROACH: ICD-10-PCS | Performed by: THORACIC SURGERY (CARDIOTHORACIC VASCULAR SURGERY)

## 2025-05-08 PROCEDURE — 93005 ELECTROCARDIOGRAM TRACING: CPT | Mod: TC | Performed by: NURSE PRACTITIONER

## 2025-05-08 PROCEDURE — 82803 BLOOD GASES ANY COMBINATION: CPT | Mod: 91

## 2025-05-08 PROCEDURE — C1898 LEAD, PMKR, OTHER THAN TRANS: HCPCS | Performed by: THORACIC SURGERY (CARDIOTHORACIC VASCULAR SURGERY)

## 2025-05-08 PROCEDURE — 33268 EXCL LAA OPN OTH PX ANY METH: CPT | Performed by: THORACIC SURGERY (CARDIOTHORACIC VASCULAR SURGERY)

## 2025-05-08 PROCEDURE — 93325 DOPPLER ECHO COLOR FLOW MAPG: CPT

## 2025-05-08 PROCEDURE — 700102 HCHG RX REV CODE 250 W/ 637 OVERRIDE(OP): Performed by: NURSE PRACTITIONER

## 2025-05-08 PROCEDURE — 85730 THROMBOPLASTIN TIME PARTIAL: CPT

## 2025-05-08 PROCEDURE — 85014 HEMATOCRIT: CPT | Mod: 91

## 2025-05-08 PROCEDURE — 33268 EXCL LAA OPN OTH PX ANY METH: CPT | Mod: AS | Performed by: NURSE PRACTITIONER

## 2025-05-08 PROCEDURE — C1894 INTRO/SHEATH, NON-LASER: HCPCS | Performed by: THORACIC SURGERY (CARDIOTHORACIC VASCULAR SURGERY)

## 2025-05-08 PROCEDURE — 85049 AUTOMATED PLATELET COUNT: CPT

## 2025-05-08 PROCEDURE — 33519 CABG ARTERY-VEIN THREE: CPT | Performed by: THORACIC SURGERY (CARDIOTHORACIC VASCULAR SURGERY)

## 2025-05-08 PROCEDURE — 160042 HCHG SURGERY MINUTES - EA ADDL 1 MIN LEVEL 5: Performed by: THORACIC SURGERY (CARDIOTHORACIC VASCULAR SURGERY)

## 2025-05-08 PROCEDURE — 700111 HCHG RX REV CODE 636 W/ 250 OVERRIDE (IP)

## 2025-05-08 PROCEDURE — 700102 HCHG RX REV CODE 250 W/ 637 OVERRIDE(OP): Performed by: THORACIC SURGERY (CARDIOTHORACIC VASCULAR SURGERY)

## 2025-05-08 PROCEDURE — 85347 COAGULATION TIME ACTIVATED: CPT | Mod: 91

## 2025-05-08 PROCEDURE — 160009 HCHG ANES TIME/MIN: Performed by: THORACIC SURGERY (CARDIOTHORACIC VASCULAR SURGERY)

## 2025-05-08 PROCEDURE — 94002 VENT MGMT INPAT INIT DAY: CPT

## 2025-05-08 PROCEDURE — 33508 ENDOSCOPIC VEIN HARVEST: CPT | Mod: AS,59 | Performed by: NURSE PRACTITIONER

## 2025-05-08 PROCEDURE — 33533 CABG ARTERIAL SINGLE: CPT | Mod: AS | Performed by: NURSE PRACTITIONER

## 2025-05-08 PROCEDURE — 503001 HCHG PERFUSION: Performed by: THORACIC SURGERY (CARDIOTHORACIC VASCULAR SURGERY)

## 2025-05-08 PROCEDURE — 93010 ELECTROCARDIOGRAM REPORT: CPT | Performed by: INTERNAL MEDICINE

## 2025-05-08 PROCEDURE — 02100Z9 BYPASS CORONARY ARTERY, ONE ARTERY FROM LEFT INTERNAL MAMMARY, OPEN APPROACH: ICD-10-PCS | Performed by: THORACIC SURGERY (CARDIOTHORACIC VASCULAR SURGERY)

## 2025-05-08 PROCEDURE — 86900 BLOOD TYPING SEROLOGIC ABO: CPT

## 2025-05-08 PROCEDURE — 86850 RBC ANTIBODY SCREEN: CPT

## 2025-05-08 PROCEDURE — 99291 CRITICAL CARE FIRST HOUR: CPT | Performed by: INTERNAL MEDICINE

## 2025-05-08 PROCEDURE — P9047 ALBUMIN (HUMAN), 25%, 50ML: HCPCS | Mod: JZ

## 2025-05-08 PROCEDURE — 700105 HCHG RX REV CODE 258: Performed by: STUDENT IN AN ORGANIZED HEALTH CARE EDUCATION/TRAINING PROGRAM

## 2025-05-08 PROCEDURE — B24BZZ4 ULTRASONOGRAPHY OF HEART WITH AORTA, TRANSESOPHAGEAL: ICD-10-PCS | Performed by: THORACIC SURGERY (CARDIOTHORACIC VASCULAR SURGERY)

## 2025-05-08 PROCEDURE — A9270 NON-COVERED ITEM OR SERVICE: HCPCS | Performed by: THORACIC SURGERY (CARDIOTHORACIC VASCULAR SURGERY)

## 2025-05-08 PROCEDURE — 700111 HCHG RX REV CODE 636 W/ 250 OVERRIDE (IP): Performed by: THORACIC SURGERY (CARDIOTHORACIC VASCULAR SURGERY)

## 2025-05-08 PROCEDURE — 94799 UNLISTED PULMONARY SVC/PX: CPT

## 2025-05-08 PROCEDURE — 700105 HCHG RX REV CODE 258: Performed by: THORACIC SURGERY (CARDIOTHORACIC VASCULAR SURGERY)

## 2025-05-08 PROCEDURE — C1751 CATH, INF, PER/CENT/MIDLINE: HCPCS | Performed by: THORACIC SURGERY (CARDIOTHORACIC VASCULAR SURGERY)

## 2025-05-08 PROCEDURE — 86901 BLOOD TYPING SEROLOGIC RH(D): CPT

## 2025-05-08 PROCEDURE — 700111 HCHG RX REV CODE 636 W/ 250 OVERRIDE (IP): Mod: JZ | Performed by: STUDENT IN AN ORGANIZED HEALTH CARE EDUCATION/TRAINING PROGRAM

## 2025-05-08 PROCEDURE — 700101 HCHG RX REV CODE 250: Performed by: STUDENT IN AN ORGANIZED HEALTH CARE EDUCATION/TRAINING PROGRAM

## 2025-05-08 PROCEDURE — 700111 HCHG RX REV CODE 636 W/ 250 OVERRIDE (IP): Mod: JZ | Performed by: THORACIC SURGERY (CARDIOTHORACIC VASCULAR SURGERY)

## 2025-05-08 PROCEDURE — 700111 HCHG RX REV CODE 636 W/ 250 OVERRIDE (IP): Mod: JZ | Performed by: NURSE PRACTITIONER

## 2025-05-08 RX ORDER — INDOMETHACIN 25 MG/1
50 CAPSULE ORAL
Status: DISCONTINUED | OUTPATIENT
Start: 2025-05-08 | End: 2025-05-08

## 2025-05-08 RX ORDER — ENOXAPARIN SODIUM 100 MG/ML
40 INJECTION SUBCUTANEOUS DAILY
Status: DISCONTINUED | OUTPATIENT
Start: 2025-05-09 | End: 2025-05-12 | Stop reason: HOSPADM

## 2025-05-08 RX ORDER — ALUMINA, MAGNESIA, AND SIMETHICONE 2400; 2400; 240 MG/30ML; MG/30ML; MG/30ML
30 SUSPENSION ORAL EVERY 4 HOURS PRN
Status: DISCONTINUED | OUTPATIENT
Start: 2025-05-08 | End: 2025-05-12 | Stop reason: HOSPADM

## 2025-05-08 RX ORDER — POLYETHYLENE GLYCOL 3350 17 G/17G
1 POWDER, FOR SOLUTION ORAL DAILY
Status: DISCONTINUED | OUTPATIENT
Start: 2025-05-09 | End: 2025-05-12 | Stop reason: HOSPADM

## 2025-05-08 RX ORDER — ETHYL ALCOHOL 62 %
1 SWAB, MEDICATED TOPICAL
Status: DISCONTINUED | OUTPATIENT
Start: 2025-05-08 | End: 2025-05-12 | Stop reason: HOSPADM

## 2025-05-08 RX ORDER — VANCOMYCIN HYDROCHLORIDE 500 MG/10ML
INJECTION, POWDER, LYOPHILIZED, FOR SOLUTION INTRAVENOUS
Status: COMPLETED | OUTPATIENT
Start: 2025-05-08 | End: 2025-05-08

## 2025-05-08 RX ORDER — MORPHINE SULFATE 4 MG/ML
2 INJECTION INTRAVENOUS
Status: DISCONTINUED | OUTPATIENT
Start: 2025-05-08 | End: 2025-05-08

## 2025-05-08 RX ORDER — OXYCODONE HYDROCHLORIDE 10 MG/1
10 TABLET ORAL
Status: DISCONTINUED | OUTPATIENT
Start: 2025-05-08 | End: 2025-05-12 | Stop reason: HOSPADM

## 2025-05-08 RX ORDER — PROCHLORPERAZINE EDISYLATE 5 MG/ML
10 INJECTION INTRAMUSCULAR; INTRAVENOUS EVERY 6 HOURS PRN
Status: DISCONTINUED | OUTPATIENT
Start: 2025-05-08 | End: 2025-05-12 | Stop reason: HOSPADM

## 2025-05-08 RX ORDER — ACETAMINOPHEN 650 MG/1
650 SUPPOSITORY RECTAL EVERY 4 HOURS PRN
Status: DISCONTINUED | OUTPATIENT
Start: 2025-05-08 | End: 2025-05-12 | Stop reason: HOSPADM

## 2025-05-08 RX ORDER — SODIUM CHLORIDE 9 MG/ML
INJECTION, SOLUTION INTRAVENOUS CONTINUOUS
Status: DISCONTINUED | OUTPATIENT
Start: 2025-05-08 | End: 2025-05-09

## 2025-05-08 RX ORDER — ACETAMINOPHEN 500 MG
1000 TABLET ORAL ONCE
Status: COMPLETED | OUTPATIENT
Start: 2025-05-08 | End: 2025-05-08

## 2025-05-08 RX ORDER — METOPROLOL TARTRATE 25 MG/1
12.5 TABLET, FILM COATED ORAL 2 TIMES DAILY
Status: COMPLETED | OUTPATIENT
Start: 2025-05-09 | End: 2025-05-09

## 2025-05-08 RX ORDER — HYDROMORPHONE HYDROCHLORIDE 2 MG/ML
INJECTION, SOLUTION INTRAMUSCULAR; INTRAVENOUS; SUBCUTANEOUS PRN
Status: DISCONTINUED | OUTPATIENT
Start: 2025-05-08 | End: 2025-05-08 | Stop reason: SURG

## 2025-05-08 RX ORDER — ASPIRIN 81 MG/1
81 TABLET ORAL DAILY
Status: DISCONTINUED | OUTPATIENT
Start: 2025-05-09 | End: 2025-05-12 | Stop reason: HOSPADM

## 2025-05-08 RX ORDER — DEXMEDETOMIDINE HYDROCHLORIDE 4 UG/ML
0-1.5 INJECTION, SOLUTION INTRAVENOUS CONTINUOUS
Status: DISCONTINUED | OUTPATIENT
Start: 2025-05-08 | End: 2025-05-09

## 2025-05-08 RX ORDER — ACETAMINOPHEN 500 MG
1000 TABLET ORAL EVERY 6 HOURS
Status: DISCONTINUED | OUTPATIENT
Start: 2025-05-08 | End: 2025-05-12 | Stop reason: HOSPADM

## 2025-05-08 RX ORDER — SODIUM CHLORIDE, SODIUM GLUCONATE, SODIUM ACETATE, POTASSIUM CHLORIDE AND MAGNESIUM CHLORIDE 526; 502; 368; 37; 30 MG/100ML; MG/100ML; MG/100ML; MG/100ML; MG/100ML
INJECTION, SOLUTION INTRAVENOUS
Status: DISCONTINUED | OUTPATIENT
Start: 2025-05-08 | End: 2025-05-08 | Stop reason: SURG

## 2025-05-08 RX ORDER — SODIUM CHLORIDE 9 MG/ML
INJECTION, SOLUTION INTRAVENOUS CONTINUOUS
Status: DISCONTINUED | OUTPATIENT
Start: 2025-05-08 | End: 2025-05-12 | Stop reason: HOSPADM

## 2025-05-08 RX ORDER — NOREPINEPHRINE BITARTRATE 0.03 MG/ML
0-1 INJECTION, SOLUTION INTRAVENOUS CONTINUOUS
Status: DISCONTINUED | OUTPATIENT
Start: 2025-05-08 | End: 2025-05-09

## 2025-05-08 RX ORDER — DEXTROSE MONOHYDRATE 25 G/50ML
12.5-25 INJECTION, SOLUTION INTRAVENOUS PRN
Status: DISCONTINUED | OUTPATIENT
Start: 2025-05-08 | End: 2025-05-09

## 2025-05-08 RX ORDER — HEPARIN SODIUM,PORCINE 1000/ML
VIAL (ML) INJECTION PRN
Status: DISCONTINUED | OUTPATIENT
Start: 2025-05-08 | End: 2025-05-08 | Stop reason: SURG

## 2025-05-08 RX ORDER — AMOXICILLIN 250 MG
2 CAPSULE ORAL 2 TIMES DAILY
Status: DISCONTINUED | OUTPATIENT
Start: 2025-05-08 | End: 2025-05-12 | Stop reason: HOSPADM

## 2025-05-08 RX ORDER — CLOPIDOGREL BISULFATE 75 MG/1
75 TABLET ORAL DAILY
Status: DISCONTINUED | OUTPATIENT
Start: 2025-05-09 | End: 2025-05-12 | Stop reason: HOSPADM

## 2025-05-08 RX ORDER — SODIUM CHLORIDE, SODIUM LACTATE, POTASSIUM CHLORIDE, CALCIUM CHLORIDE 600; 310; 30; 20 MG/100ML; MG/100ML; MG/100ML; MG/100ML
INJECTION, SOLUTION INTRAVENOUS
Status: DISCONTINUED | OUTPATIENT
Start: 2025-05-08 | End: 2025-05-08 | Stop reason: SURG

## 2025-05-08 RX ORDER — EPINEPHRINE HCL IN 0.9 % NACL 4MG/250ML
0-.5 PLASTIC BAG, INJECTION (ML) INTRAVENOUS CONTINUOUS
Status: DISCONTINUED | OUTPATIENT
Start: 2025-05-08 | End: 2025-05-09

## 2025-05-08 RX ORDER — LIDOCAINE HYDROCHLORIDE 20 MG/ML
INJECTION, SOLUTION EPIDURAL; INFILTRATION; INTRACAUDAL; PERINEURAL PRN
Status: DISCONTINUED | OUTPATIENT
Start: 2025-05-08 | End: 2025-05-08 | Stop reason: SURG

## 2025-05-08 RX ORDER — ACETAMINOPHEN 325 MG/1
650 TABLET ORAL EVERY 4 HOURS PRN
Status: DISCONTINUED | OUTPATIENT
Start: 2025-05-08 | End: 2025-05-12 | Stop reason: HOSPADM

## 2025-05-08 RX ORDER — ONDANSETRON 2 MG/ML
8 INJECTION INTRAMUSCULAR; INTRAVENOUS EVERY 6 HOURS PRN
Status: DISCONTINUED | OUTPATIENT
Start: 2025-05-08 | End: 2025-05-12 | Stop reason: HOSPADM

## 2025-05-08 RX ORDER — SODIUM CHLORIDE, SODIUM GLUCONATE, SODIUM ACETATE, POTASSIUM CHLORIDE AND MAGNESIUM CHLORIDE 526; 502; 368; 37; 30 MG/100ML; MG/100ML; MG/100ML; MG/100ML; MG/100ML
INJECTION, SOLUTION INTRAVENOUS PRN
Status: DISCONTINUED | OUTPATIENT
Start: 2025-05-08 | End: 2025-05-08

## 2025-05-08 RX ORDER — MIDAZOLAM HYDROCHLORIDE 1 MG/ML
2 INJECTION INTRAMUSCULAR; INTRAVENOUS
Status: DISCONTINUED | OUTPATIENT
Start: 2025-05-08 | End: 2025-05-08

## 2025-05-08 RX ORDER — ROSUVASTATIN CALCIUM 20 MG/1
20 TABLET, COATED ORAL
Status: DISCONTINUED | OUTPATIENT
Start: 2025-05-08 | End: 2025-05-12 | Stop reason: HOSPADM

## 2025-05-08 RX ORDER — METOPROLOL TARTRATE 25 MG/1
25 TABLET, FILM COATED ORAL 2 TIMES DAILY
Status: DISCONTINUED | OUTPATIENT
Start: 2025-05-10 | End: 2025-05-12 | Stop reason: HOSPADM

## 2025-05-08 RX ORDER — NITROGLYCERIN 20 MG/100ML
0-100 INJECTION INTRAVENOUS CONTINUOUS
Status: DISCONTINUED | OUTPATIENT
Start: 2025-05-08 | End: 2025-05-09

## 2025-05-08 RX ORDER — ACETAMINOPHEN 500 MG
1000 TABLET ORAL EVERY 6 HOURS PRN
Status: DISCONTINUED | OUTPATIENT
Start: 2025-05-18 | End: 2025-05-12 | Stop reason: HOSPADM

## 2025-05-08 RX ORDER — POTASSIUM CHLORIDE 7.45 MG/ML
10 INJECTION INTRAVENOUS ONCE
Status: COMPLETED | OUTPATIENT
Start: 2025-05-09 | End: 2025-05-09

## 2025-05-08 RX ORDER — OXYCODONE HYDROCHLORIDE 5 MG/1
5 TABLET ORAL
Status: DISCONTINUED | OUTPATIENT
Start: 2025-05-08 | End: 2025-05-12 | Stop reason: HOSPADM

## 2025-05-08 RX ORDER — PAPAVERINE HYDROCHLORIDE 30 MG/ML
INJECTION INTRAMUSCULAR; INTRAVENOUS
Status: DISCONTINUED | OUTPATIENT
Start: 2025-05-08 | End: 2025-05-08

## 2025-05-08 RX ORDER — DEXTROSE MONOHYDRATE 25 G/50ML
INJECTION, SOLUTION INTRAVENOUS
Status: DISCONTINUED | OUTPATIENT
Start: 2025-05-08 | End: 2025-05-08 | Stop reason: SURG

## 2025-05-08 RX ORDER — OMEPRAZOLE 20 MG/1
20 CAPSULE, DELAYED RELEASE ORAL DAILY
Status: DISCONTINUED | OUTPATIENT
Start: 2025-05-09 | End: 2025-05-12 | Stop reason: HOSPADM

## 2025-05-08 RX ORDER — TRAMADOL HYDROCHLORIDE 50 MG/1
50 TABLET ORAL EVERY 4 HOURS PRN
Status: DISCONTINUED | OUTPATIENT
Start: 2025-05-08 | End: 2025-05-12 | Stop reason: HOSPADM

## 2025-05-08 RX ORDER — INSULIN LISPRO 100 [IU]/ML
0-14 INJECTION, SOLUTION INTRAVENOUS; SUBCUTANEOUS
Status: DISPENSED | OUTPATIENT
Start: 2025-05-09 | End: 2025-05-09

## 2025-05-08 RX ORDER — CEFAZOLIN SODIUM 1 G/3ML
INJECTION, POWDER, FOR SOLUTION INTRAMUSCULAR; INTRAVENOUS PRN
Status: DISCONTINUED | OUTPATIENT
Start: 2025-05-08 | End: 2025-05-08 | Stop reason: SURG

## 2025-05-08 RX ORDER — METOPROLOL TARTRATE 25 MG/1
12.5 TABLET, FILM COATED ORAL ONCE
Status: COMPLETED | OUTPATIENT
Start: 2025-05-08 | End: 2025-05-08

## 2025-05-08 RX ORDER — MAGNESIUM SULFATE 1 G/100ML
1 INJECTION INTRAVENOUS DAILY
Status: COMPLETED | OUTPATIENT
Start: 2025-05-08 | End: 2025-05-10

## 2025-05-08 RX ORDER — BISACODYL 10 MG
10 SUPPOSITORY, RECTAL RECTAL
Status: DISCONTINUED | OUTPATIENT
Start: 2025-05-08 | End: 2025-05-12 | Stop reason: HOSPADM

## 2025-05-08 RX ORDER — ROCURONIUM BROMIDE 10 MG/ML
INJECTION, SOLUTION INTRAVENOUS PRN
Status: DISCONTINUED | OUTPATIENT
Start: 2025-05-08 | End: 2025-05-08 | Stop reason: SURG

## 2025-05-08 RX ORDER — PROTAMINE SULFATE 10 MG/ML
INJECTION, SOLUTION INTRAVENOUS PRN
Status: DISCONTINUED | OUTPATIENT
Start: 2025-05-08 | End: 2025-05-08 | Stop reason: SURG

## 2025-05-08 RX ORDER — PHENYLEPHRINE HCL IN 0.9% NACL 1 MG/10 ML
SYRINGE (ML) INTRAVENOUS PRN
Status: DISCONTINUED | OUTPATIENT
Start: 2025-05-08 | End: 2025-05-08 | Stop reason: SURG

## 2025-05-08 RX ADMIN — CEFAZOLIN 2 G: 1 INJECTION, POWDER, FOR SOLUTION INTRAMUSCULAR; INTRAVENOUS at 12:01

## 2025-05-08 RX ADMIN — SODIUM CHLORIDE 2 UNITS/HR: 9 INJECTION, SOLUTION INTRAVENOUS at 23:09

## 2025-05-08 RX ADMIN — CEFAZOLIN 2 G: 1 INJECTION, POWDER, FOR SOLUTION INTRAMUSCULAR; INTRAVENOUS at 08:01

## 2025-05-08 RX ADMIN — SODIUM CHLORIDE: 9 INJECTION, SOLUTION INTRAVENOUS at 22:09

## 2025-05-08 RX ADMIN — HEPARIN SODIUM 45000 UNITS: 1000 INJECTION, SOLUTION INTRAVENOUS; SUBCUTANEOUS at 09:23

## 2025-05-08 RX ADMIN — NOREPINEPHRINE BITARTRATE 0.02 MCG/KG/MIN: 1 INJECTION, SOLUTION, CONCENTRATE INTRAVENOUS at 08:24

## 2025-05-08 RX ADMIN — HYDROMORPHONE HYDROCHLORIDE 2 MG: 2 INJECTION INTRAMUSCULAR; INTRAVENOUS; SUBCUTANEOUS at 09:02

## 2025-05-08 RX ADMIN — Medication 50 MCG: at 09:51

## 2025-05-08 RX ADMIN — FENTANYL CITRATE 50 MCG: 50 INJECTION, SOLUTION INTRAMUSCULAR; INTRAVENOUS at 22:17

## 2025-05-08 RX ADMIN — VANCOMYCIN HYDROCHLORIDE 1500 MG: 1 INJECTION, POWDER, LYOPHILIZED, FOR SOLUTION INTRAVENOUS at 07:39

## 2025-05-08 RX ADMIN — Medication 1 APPLICATOR: at 22:10

## 2025-05-08 RX ADMIN — AMINOCAPROIC ACID 2 G/HR: 250 INJECTION, SOLUTION INTRAVENOUS at 08:30

## 2025-05-08 RX ADMIN — MAGNESIUM SULFATE IN DEXTROSE 1 G: 10 INJECTION, SOLUTION INTRAVENOUS at 13:20

## 2025-05-08 RX ADMIN — INSULIN HUMAN 5 UNITS: 100 INJECTION, SOLUTION PARENTERAL at 11:27

## 2025-05-08 RX ADMIN — ROCURONIUM BROMIDE 50 MG: 10 INJECTION INTRAVENOUS at 09:52

## 2025-05-08 RX ADMIN — OXYCODONE 5 MG: 5 TABLET ORAL at 20:00

## 2025-05-08 RX ADMIN — ROCURONIUM BROMIDE 100 MG: 10 INJECTION INTRAVENOUS at 07:36

## 2025-05-08 RX ADMIN — ROSUVASTATIN CALCIUM 20 MG: 20 TABLET, FILM COATED ORAL at 21:59

## 2025-05-08 RX ADMIN — LIDOCAINE HYDROCHLORIDE 100 MG: 20 INJECTION, SOLUTION EPIDURAL; INFILTRATION; INTRACAUDAL; PERINEURAL at 07:36

## 2025-05-08 RX ADMIN — SODIUM CHLORIDE, POTASSIUM CHLORIDE, SODIUM LACTATE AND CALCIUM CHLORIDE: 600; 310; 30; 20 INJECTION, SOLUTION INTRAVENOUS at 07:30

## 2025-05-08 RX ADMIN — SENNOSIDES AND DOCUSATE SODIUM 2 TABLET: 50; 8.6 TABLET ORAL at 20:00

## 2025-05-08 RX ADMIN — PROPOFOL 100 MG: 10 INJECTION, EMULSION INTRAVENOUS at 08:56

## 2025-05-08 RX ADMIN — PROPOFOL 200 MG: 10 INJECTION, EMULSION INTRAVENOUS at 07:36

## 2025-05-08 RX ADMIN — SODIUM CHLORIDE, SODIUM GLUCONATE, SODIUM ACETATE, POTASSIUM CHLORIDE AND MAGNESIUM CHLORIDE: 526; 502; 368; 37; 30 INJECTION, SOLUTION INTRAVENOUS at 07:45

## 2025-05-08 RX ADMIN — METHADONE HYDROCHLORIDE 10 MG: 10 INJECTION, SOLUTION INTRAMUSCULAR; INTRAVENOUS; SUBCUTANEOUS at 08:01

## 2025-05-08 RX ADMIN — SODIUM CHLORIDE: 9 INJECTION, SOLUTION INTRAVENOUS at 13:20

## 2025-05-08 RX ADMIN — Medication 50 MCG: at 09:58

## 2025-05-08 RX ADMIN — METOPROLOL TARTRATE 12.5 MG: 25 TABLET, FILM COATED ORAL at 07:30

## 2025-05-08 RX ADMIN — SODIUM BICARBONATE 50 MEQ: 84 INJECTION INTRAVENOUS at 13:43

## 2025-05-08 RX ADMIN — CLEVIPIDINE 12 MG/HR: 0.5 EMULSION INTRAVENOUS at 22:34

## 2025-05-08 RX ADMIN — DEXTROSE MONOHYDRATE: 25 INJECTION, SOLUTION INTRAVENOUS at 11:27

## 2025-05-08 RX ADMIN — DEXMEDETOMIDINE 0.5 MCG/KG/HR: 100 INJECTION, SOLUTION INTRAVENOUS at 08:56

## 2025-05-08 RX ADMIN — PROTAMINE SULFATE 250 MG: 10 INJECTION, SOLUTION INTRAVENOUS at 11:42

## 2025-05-08 RX ADMIN — AMINOCAPROIC ACID 10 G: 250 INJECTION, SOLUTION INTRAVENOUS at 08:00

## 2025-05-08 RX ADMIN — MORPHINE SULFATE 2 MG: 4 INJECTION, SOLUTION INTRAMUSCULAR; INTRAVENOUS at 15:27

## 2025-05-08 RX ADMIN — CLEVIPIDINE 2 MG/HR: 0.5 EMULSION INTRAVENOUS at 20:15

## 2025-05-08 RX ADMIN — MORPHINE SULFATE 2 MG: 4 INJECTION, SOLUTION INTRAMUSCULAR; INTRAVENOUS at 14:47

## 2025-05-08 RX ADMIN — SODIUM CHLORIDE, SODIUM GLUCONATE, SODIUM ACETATE, POTASSIUM CHLORIDE AND MAGNESIUM CHLORIDE: 526; 502; 368; 37; 30 INJECTION, SOLUTION INTRAVENOUS at 13:15

## 2025-05-08 RX ADMIN — Medication 1 APPLICATOR: at 13:24

## 2025-05-08 RX ADMIN — ACETAMINOPHEN 1000 MG: 500 TABLET ORAL at 20:00

## 2025-05-08 RX ADMIN — SODIUM CHLORIDE, SODIUM GLUCONATE, SODIUM ACETATE, POTASSIUM CHLORIDE AND MAGNESIUM CHLORIDE: 526; 502; 368; 37; 30 INJECTION, SOLUTION INTRAVENOUS at 12:33

## 2025-05-08 RX ADMIN — ACETAMINOPHEN 1000 MG: 500 TABLET ORAL at 06:33

## 2025-05-08 RX ADMIN — METHADONE HYDROCHLORIDE 10 MG: 10 INJECTION, SOLUTION INTRAMUSCULAR; INTRAVENOUS; SUBCUTANEOUS at 07:35

## 2025-05-08 RX ADMIN — OXYCODONE 5 MG: 5 TABLET ORAL at 20:44

## 2025-05-08 ASSESSMENT — PULMONARY FUNCTION TESTS: FVC: 2.3

## 2025-05-08 ASSESSMENT — PAIN DESCRIPTION - PAIN TYPE
TYPE: ACUTE PAIN;SURGICAL PAIN
TYPE: ACUTE PAIN;SURGICAL PAIN
TYPE: ACUTE PAIN
TYPE: ACUTE PAIN;SURGICAL PAIN
TYPE: ACUTE PAIN;SURGICAL PAIN
TYPE: ACUTE PAIN
TYPE: ACUTE PAIN;SURGICAL PAIN
TYPE: ACUTE PAIN;SURGICAL PAIN

## 2025-05-08 NOTE — ANESTHESIA POSTPROCEDURE EVALUATION
Patient: Obinna SCHUMACHER Lujetic    Procedure Summary       Date: 05/08/25 Room / Location: [facility] OR 03 / SURGERY [facility]    Anesthesia Start: 0730 Anesthesia Stop: 1249    Procedures:       CABG, WITH ENDOSCOPIC VEIN PROCUREMENT  X4 (Chest)      CLIPPING, LEFT ATRIAL APPENDAGE (Chest)      ECHOCARDIOGRAM, TRANSESOPHAGEAL, INTRAOPERATIVE (Mouth) Diagnosis: (multivessel coronary artery disease)    Surgeons: Elayne Valdovinos M.D. Responsible Provider: Armen Cortes M.D.    Anesthesia Type: general ASA Status: 4            Final Anesthesia Type: general  Last vitals  BP   Blood Pressure: 96/59, Arterial BP: 101/57    Temp   (!) 35.8 °C (96.4 °F)    Pulse   70   Resp   19    SpO2   97 %      Anesthesia Post Evaluation    Patient location during evaluation: ICU  Patient participation: complete - patient cannot participate  Post-procedure mental status: sedated.    Airway patency: patent  Anesthetic complications: no  Cardiovascular status: hemodynamically stable  Respiratory status: acceptable, intubated and ventilator  Hydration status: euvolemic    PONV: none          No notable events documented.     Nurse Pain Score: 0 (NPRS)

## 2025-05-08 NOTE — CONSULTS
Critical Care Consultation    Date of consult: 5/8/2025    Referring Physician  Mello Huber M.D.    Reason for Consultation  Post CABG critical care consult    History of Presenting Illness  45 y.o. male who is a  and has a past medical history significant for dyslipidemia history of childhood asthma, obstructive sleep apnea who has presented after having CABG x 4 done 5/8/2025 by Dr. Elayne Valdovinos.  Patient had an uneventful perioperative course.  He was paced for a few minutes postoperatively.  V wires in place.  Pacer is off currently.  He received crystalloids 1.5 L intraoperatively and currently on norepinephrine and Precedex infusions.  He is post CABG EF was 55 to 60% in the RV function was okay.    Code Status  Full Code    Review of Systems  Review of Systems   Unable to perform ROS: Acuity of condition       Past Medical History   has a past medical history of Anesthesia, Asthma, Fatty liver, High cholesterol, Hypertension, Psychiatric problem, Sleep apnea, and Snoring.    Surgical History   has a past surgical history that includes hand surgery; [id number]; and shoulder surgery.    Family History  family history includes Diabetes in his maternal uncle; Stroke in his maternal grandmother.    Social History   reports that he quit smoking about 37 years ago. His smoking use included cigarettes. He has quit using smokeless tobacco.  His smokeless tobacco use included chew. He reports that he does not currently use alcohol. He reports that he does not use drugs.    Medications  Home Medications       Reviewed by Fabrizio Baird R.N. (Registered Nurse) on 05/08/25 at 0745  Med List Status: Complete     Medication Last Dose Status   acetaminophen (Tylenol) tablet 650 mg  Active   albuterol 108 (90 Base) MCG/ACT Aero Soln inhalation aerosol  Active   aminocaproic acid (Amicar) 10 g in  mL IV Bolus  Active   aminocaproic acid (Amicar) 5 g in  mL Infusion  Active   aspirin 81 MG EC  tablet 5/5/2025 Active   aspirin EC tablet 81 mg  Active   dexmedetomidine (Precedex) 400 MCG/100ML infusion  Active   EPINEPHrine (Adrenalin) infusion 4 mg/250 mL (premix)  Active   hydrALAZINE (Apresoline) injection 10 mg  Active   insulin regular (HumuLIN R/NovoLIN R) 100 Units in  mL infusion premix  Active   lidocaine (Xylocaine) 1 % injection 0.5 mL  Active   methadone 10 mg/mL (Dolophine) injection 20 mg  Active   metoprolol SR (TOPROL XL) 25 MG TABLET SR 24 HR 5/5/2025 Active   metoprolol SR (Toprol XL) tablet 25 mg  Active   morphine 4 MG/ML injection 1-5 mg  Active   nitroglycerin (Nitrostat) tablet 0.4 mg  Active   norepinephrine (Levophed) 8 mg in 250 mL NS infusion (premix)  Active   polyethylene glycol/lytes (Miralax) Packet 1 Packet  Active   rosuvastatin (CRESTOR) 20 MG Tab 5/5/2025 Active   rosuvastatin (Crestor) tablet 20 mg  Active   senna-docusate (Pericolace Or Senokot S) 8.6-50 MG per tablet 2 Tablet  Active                  Audit from Redirected Encounters    **Home medications have not yet been reviewed for this encounter**       Current Facility-Administered Medications   Medication Dose Route Frequency Provider Last Rate Last Admin    norepinephrine (Levophed) 8 mg in 250 mL NS infusion (premix)  0-1 mcg/kg/min (Ideal) Intravenous Continuous Elayne Valdovinos M.D. 1.4 mL/hr at 05/08/25 1310 0.01 mcg/kg/min at 05/08/25 1310    EPINEPHrine (Adrenalin) infusion 4 mg/250 mL (premix)  0-0.5 mcg/kg/min (Ideal) Intravenous Continuous Elayne Valdovinos M.D.   Dose not Required at 05/08/25 1300    Respiratory Therapy Consult   Nebulization Continuous RT TARIQ Hernandez.P.R.N.        NS infusion   Intravenous Continuous TARIQ Hernandez.P.R.N. 10 mL/hr at 05/08/25 1320 New Bag at 05/08/25 1320    NS infusion   Intravenous Continuous TARIQ Hernandez.P.R.N. 30 mL/hr at 05/08/25 1311 Associate Infusion Pump at 05/08/25 1311    electrolyte-A (Plasmalyte-A) infusion   Intravenous PRN Jose R Osorio,  A.P.R.N.        [START ON 5/9/2025] enoxaparin (Lovenox) inj 40 mg  40 mg Subcutaneous DAILY AT 1800 Jose R Osorio, A.P.R.N.        Nozin nasal  swab  1 Applicator Each Nostril BID Jose R Osorio, A.P.R.N.   1 Applicator at 05/08/25 1324    calcium CHLORIDE 10 % 1,000 mg in dextrose 5% 100 mL IVPB  1,000 mg Intravenous Once PRN Jose R Osorio, A.P.R.N.        [START ON 5/9/2025] calcium CHLORIDE 10 % 1,000 mg in dextrose 5% 100 mL IVPB  1,000 mg Intravenous Once PRN Jose R Osorio, A.P.R.N.        magnesium sulfate in D5W IVPB premix 1 g  1 g Intravenous DAILY Jose R Osorio, A.P.R.N. 100 mL/hr at 05/08/25 1320 1 g at 05/08/25 1320    K+ Scale: Goal of 4.5  1 Each Intravenous Q6HRS Jose R Osorio, A.P.R.N.        [START ON 5/9/2025] metoprolol tartrate (Lopressor) tablet 12.5 mg  12.5 mg Oral BID Jose R Osorio, A.P.R.N.        Followed by    [START ON 5/10/2025] metoprolol tartrate (Lopressor) tablet 25 mg  25 mg Oral BID Jose R Osorio, A.P.R.N.        [START ON 5/9/2025] aspirin EC tablet 81 mg  81 mg Oral DAILY Jose R Osorio, A.P.R.N.        [START ON 5/9/2025] clopidogrel (Plavix) tablet 75 mg  75 mg Oral DAILY Jose R Osorio, A.P.R.N.        clevidipine (Cleviprex) IV emulsion  0-21 mg/hr Intravenous Continuous Jose R Osorio, A.P.R.N.   Dose not Required at 05/08/25 1300    nitroglycerin 50 mg in D5W 250 ml infusion  0-100 mcg/min Intravenous Continuous Jose R Osorio, A.P.R.N.   Dose not Required at 05/08/25 1300    acetaminophen (Tylenol) tablet 1,000 mg  1,000 mg Oral Q6HRS Jose R Osorio, A.P.R.N.        Followed by    [START ON 5/18/2025] acetaminophen (Tylenol) tablet 1,000 mg  1,000 mg Oral Q6HRS PRN Jose R Osorio, A.P.R.N.        oxyCODONE immediate-release (Roxicodone) tablet 5 mg  5 mg Oral Q3HRS PRN Jose R Osorio, A.P.R.N.        Or    oxyCODONE immediate release (Roxicodone) tablet 10 mg  10 mg Oral Q3HRS PRN Jose R Osorio, A.P.R.N.        Or    fentaNYL (Sublimaze) injection 50 mcg  50 mcg  Intravenous Q3HRS PRN Jose R Osorio, A.P.R.N.        traMADol (Ultram) 50 MG tablet 50 mg  50 mg Oral Q4HRS PRN Jose R Osorio, A.P.R.N.        midazolam (Versed) injection 2 mg  2 mg Intravenous Q HOUR PRN Jose R Osorio, A.P.R.N.        dexmedetomidine (Precedex) 400 MCG/100ML infusion  0-1.5 mcg/kg/hr (Ideal) Intravenous Continuous Jose R Osorio, A.P.R.N. 9.4 mL/hr at 05/08/25 1313 0.5 mcg/kg/hr at 05/08/25 1313    sodium bicarbonate 8.4 % injection 50 mEq  50 mEq Intravenous Q HOUR PRN Jose R Osorio, A.P.R.N.   50 mEq at 05/08/25 1343    morphine 4 MG/ML injection 2 mg  2 mg Intravenous Q HOUR PRN Jose R Osorio, A.P.R.N.        ondansetron (Zofran) syringe/vial injection 8 mg  8 mg Intravenous Q6HRS PRN Jose R Osorio, A.P.R.N.        Or    prochlorperazine (Compazine) injection 10 mg  10 mg Intravenous Q6HRS PRN Jose R Osorio, A.P.R.N.        acetaminophen (Tylenol) tablet 650 mg  650 mg Oral Q4HRS PRN Jose R Osorio, A.P.R.N.        Or    acetaminophen (Tylenol) suppository 650 mg  650 mg Rectal Q4HRS PRN Jose R Osorio, A.P.R.N.        senna-docusate (Pericolace Or Senokot S) 8.6-50 MG per tablet 2 Tablet  2 Tablet Oral BID Jose R Osorio, A.P.R.N.        And    [START ON 5/9/2025] polyethylene glycol/lytes (Miralax) Packet 1 Packet  1 Packet Oral DAILY Jose R Osorio, A.P.R.N.        And    [START ON 5/10/2025] magnesium hydroxide (Milk Of Magnesia) suspension 30 mL  30 mL Oral DAILY Jose R Osorio, A.P.R.N.        And    bisacodyl (Dulcolax) suppository 10 mg  10 mg Rectal QDAY PRN Jose R Osorio, A.P.R.N.        [START ON 5/9/2025] omeprazole (PriLOSEC) capsule 20 mg  20 mg Oral DAILY MICHAEL Hernandez.        mag hydrox-al hydrox-simeth (Maalox Plus Es Or Mylanta Ds) suspension 30 mL  30 mL Oral Q4HRS PRN BECKY HernandezRFRED        rosuvastatin (Crestor) tablet 20 mg  20 mg Oral QHS QI HernandezPMalinaRNAPOLEON.           Allergies  No Known Allergies    Vital Signs last 24 hours  Temp:  [35.9 °C (96.6 °F)-36.7  °C (98.1 °F)] 35.9 °C (96.6 °F)  Pulse:  [56-69] 64  Resp:  [14-37] 21  BP: (107-138)/(60-84) 107/65  SpO2:  [89 %-99 %] 98 %    Physical Exam  Physical Exam  Constitutional:       Comments: Sedated on mechanical ventilation.   HENT:      Head: Normocephalic and atraumatic.      Nose: Nose normal.      Mouth/Throat:      Mouth: Mucous membranes are moist.   Eyes:      Pupils: Pupils are equal, round, and reactive to light.   Cardiovascular:      Rate and Rhythm: Normal rate.      Pulses: Normal pulses.   Pulmonary:      Comments: Mechanical breath sounds.  Wound VAC in situ.  Mediastinal and chest tubes in place.  Pacing wires in situ.    Abdominal:      General: Abdomen is flat.      Palpations: Abdomen is soft.   Musculoskeletal:         General: Normal range of motion.   Skin:     General: Skin is warm.      Capillary Refill: Capillary refill takes less than 2 seconds.         Fluids    Intake/Output Summary (Last 24 hours) at 2025 1357  Last data filed at 2025 1347  Gross per 24 hour   Intake 7752.5 ml   Output 2981 ml   Net 4771.5 ml       Laboratory  Recent Results (from the past 48 hours)   HEMOGLOBIN A1C    Collection Time: 25  4:14 AM   Result Value Ref Range    Glycohemoglobin 5.9 (H) 4.0 - 5.6 %    Est Avg Glucose 123 mg/dL   ABO Rh Confirm    Collection Time: 25  4:14 AM   Result Value Ref Range    ABO Rh Confirm O POS    EC-ECHOCARDIOGRAM COMPLETE W/O CONT    Collection Time: 25 10:30 AM   Result Value Ref Range    Eject.Frac. MOD BP 53.9     Eject.Frac. MOD 4C 57.03     Eject.Frac. MOD 2C 51.1     Left Ventrical Ejection Fraction 55    EKG    Collection Time: 25  9:00 PM   Result Value Ref Range    Report       [facility]    Test Date:  2025  Pt Name:    SHYANNE SILVERIO              Department: 171  MRN:        [id number]                      Room:       T613  Gender:     Male                         Technician: BROOKLYN  :        1980                    Requested By:EFRAÍN PACHECO  Order #:    [id number]                    Reading MD: Mello Huber MD    Measurements  Intervals                                Axis  Rate:       62                           P:          43  NJ:         157                          QRS:        79  QRSD:       105                          T:          72  QT:         396  QTc:        402    Interpretive Statements  Sinus rhythm  Electronically Signed On 05- 21:00:04 PDT by Mello Huber MD     POCT glucose device results    Collection Time: 05/08/25  5:40 AM   Result Value Ref Range    POC Glucose, Blood 109 (H) 65 - 99 mg/dL   Basic Metabolic Panel (BMP) Tomorrow AM (LAB)    Collection Time: 05/08/25  5:42 AM   Result Value Ref Range    Sodium 138 135 - 145 mmol/L    Potassium 4.3 3.6 - 5.5 mmol/L    Chloride 104 96 - 112 mmol/L    Co2 23 20 - 33 mmol/L    Glucose 114 (H) 65 - 99 mg/dL    Bun 16 8 - 22 mg/dL    Creatinine 0.99 0.50 - 1.40 mg/dL    Calcium 9.7 8.5 - 10.5 mg/dL    Anion Gap 11.0 7.0 - 16.0   ESTIMATED GFR    Collection Time: 05/08/25  5:42 AM   Result Value Ref Range    GFR (CKD-EPI) 95 >60 mL/min/1.73 m 2   COD (Adult)    Collection Time: 05/08/25  7:23 AM   Result Value Ref Range    ABO Grouping Only O     Rh Grouping Only POS     Antibody Screen-Cod NEG    POCT arterial blood gas device results    Collection Time: 05/08/25  8:09 AM   Result Value Ref Range    Ph 7.257 (LL) 7.350 - 7.450    Pco2 61.2 (HH) 32.0 - 48.0 mmHg    Po2 321 (H) 83 - 108 mmHg    Tco2 29 (L) 32 - 48 mmol/L    S02 100 (H) 93 - 99 %    Hco3 27.3 21.0 - 28.0 mmol/L    BE -2 -4 - 3 mmol/L    Body Temp 36.0 C degrees    Ph Temp Cande 7.271 (LL) 7.350 - 7.450    Pco2 Temp Co 58.6 (HH) 32.0 - 48.0 mmHg    Po2 Temp Cor 316 (H) 83 - 108 mmHg    Specimen Arterial    POCT sodium device results    Collection Time: 05/08/25  8:09 AM   Result Value Ref Range    Istat Sodium 140 135 - 145 mmol/L   POCT potassium device results    Collection Time:  05/08/25  8:09 AM   Result Value Ref Range    Istat Potassium 4.2 3.6 - 5.5 mmol/L   POCT ionized CA device results    Collection Time: 05/08/25  8:09 AM   Result Value Ref Range    Istat Ionized Calcium 1.29 1.10 - 1.30 mmol/L   POCT hematocrit and hemoglobin device results    Collection Time: 05/08/25  8:09 AM   Result Value Ref Range    Istat Hematocrit 51 42 - 52 %    Istat Hemoglobin 17.3 14.0 - 18.0 g/dL   POCT activated clotting time device results    Collection Time: 05/08/25  8:09 AM   Result Value Ref Range    Istat Activated Clotting Time 141 (H) 74 - 137 sec   POCT arterial blood gas device results    Collection Time: 05/08/25  9:30 AM   Result Value Ref Range    Ph 7.274 (LL) 7.350 - 7.450    Pco2 55.0 (HH) 32.0 - 48.0 mmHg    Po2 126 (H) 83 - 108 mmHg    Tco2 27 (L) 32 - 48 mmol/L    S02 98 93 - 99 %    Hco3 25.5 21.0 - 28.0 mmol/L    BE -2 -4 - 3 mmol/L    Body Temp 36.0 C degrees    Ph Temp Cande 7.288 (LL) 7.350 - 7.450    Pco2 Temp Co 52.6 (HH) 32.0 - 48.0 mmHg    Po2 Temp Cor 120 (H) 83 - 108 mmHg    Specimen Arterial    POCT sodium device results    Collection Time: 05/08/25  9:30 AM   Result Value Ref Range    Istat Sodium 138 135 - 145 mmol/L   POCT potassium device results    Collection Time: 05/08/25  9:30 AM   Result Value Ref Range    Istat Potassium 5.0 3.6 - 5.5 mmol/L   POCT ionized CA device results    Collection Time: 05/08/25  9:30 AM   Result Value Ref Range    Istat Ionized Calcium 1.23 1.10 - 1.30 mmol/L   POCT hematocrit and hemoglobin device results    Collection Time: 05/08/25  9:30 AM   Result Value Ref Range    Istat Hematocrit 47 42 - 52 %    Istat Hemoglobin 16.0 14.0 - 18.0 g/dL   POCT activated clotting time device results    Collection Time: 05/08/25  9:46 AM   Result Value Ref Range    Istat Activated Clotting Time >1000 (H) 74 - 137 sec   POCT activated clotting time device results    Collection Time: 05/08/25 10:02 AM   Result Value Ref Range    Istat Activated Clotting  Time 855 (H) 74 - 137 sec   POCT venous blood gas device results    Collection Time: 05/08/25 10:21 AM   Result Value Ref Range    Ph 7.354 7.310 - 7.450    Pco2 49.7 38.0 - 54.0 mmHg    Po2 42 23 - 48 mmHg    Tco2 29 20 - 33 mmol/L    SO2 74 60 - 85 %    Hco3 27.7 22.0 - 29.0 mmol/L    BE 1 -2 - 3 mmol/L    Body Temp 34.8 C degrees    Ph Temp Correc 7.386 7.310 - 7.450    Pco2 Temp Cande 45.1 38.0 - 54.0 mmHg    Po2 Temp Corre 36 23 - 48 mmHg    Specimen Venous    POCT sodium device results    Collection Time: 05/08/25 10:21 AM   Result Value Ref Range    Istat Sodium 134 (L) 135 - 145 mmol/L   POCT potassium device results    Collection Time: 05/08/25 10:21 AM   Result Value Ref Range    Istat Potassium 6.6 (HH) 3.6 - 5.5 mmol/L   POCT ionized CA device results    Collection Time: 05/08/25 10:21 AM   Result Value Ref Range    Istat Ionized Calcium 0.99 (L) 1.10 - 1.30 mmol/L   POCT hematocrit and hemoglobin device results    Collection Time: 05/08/25 10:21 AM   Result Value Ref Range    Istat Hematocrit 36 (L) 42 - 52 %    Istat Hemoglobin 12.2 (L) 14.0 - 18.0 g/dL   POCT activated clotting time device results    Collection Time: 05/08/25 10:31 AM   Result Value Ref Range    Istat Activated Clotting Time 878 (H) 74 - 137 sec   POCT arterial blood gas device results    Collection Time: 05/08/25 10:59 AM   Result Value Ref Range    Ph 7.344 (L) 7.350 - 7.450    Pco2 44.7 32.0 - 48.0 mmHg    Po2 214 (H) 83 - 108 mmHg    Tco2 26 (L) 32 - 48 mmol/L    S02 100 (H) 93 - 99 %    Hco3 24.4 21.0 - 28.0 mmol/L    BE -2 -4 - 3 mmol/L    Body Temp 34.5 C degrees    Ph Temp Cande 7.380 7.350 - 7.450    Pco2 Temp Co 40.1 32.0 - 48.0 mmHg    Po2 Temp Cor 202 (H) 83 - 108 mmHg    Specimen Arterial    POCT sodium device results    Collection Time: 05/08/25 10:59 AM   Result Value Ref Range    Istat Sodium 135 135 - 145 mmol/L   POCT potassium device results    Collection Time: 05/08/25 10:59 AM   Result Value Ref Range    Istat  Potassium 7.1 (HH) 3.6 - 5.5 mmol/L   POCT ionized CA device results    Collection Time: 05/08/25 10:59 AM   Result Value Ref Range    Istat Ionized Calcium 1.02 (L) 1.10 - 1.30 mmol/L   POCT hematocrit and hemoglobin device results    Collection Time: 05/08/25 10:59 AM   Result Value Ref Range    Istat Hematocrit 36 (L) 42 - 52 %    Istat Hemoglobin 12.2 (L) 14.0 - 18.0 g/dL   POCT activated clotting time device results    Collection Time: 05/08/25 11:06 AM   Result Value Ref Range    Istat Activated Clotting Time 654 (H) 74 - 137 sec   POCT arterial blood gas device results    Collection Time: 05/08/25 11:23 AM   Result Value Ref Range    Ph 7.348 (L) 7.350 - 7.450    Pco2 45.7 32.0 - 48.0 mmHg    Po2 147 (H) 83 - 108 mmHg    Tco2 26 (L) 32 - 48 mmol/L    S02 99 93 - 99 %    Hco3 25.1 21.0 - 28.0 mmol/L    BE -1 -4 - 3 mmol/L    Body Temp 35.9 C degrees    Ph Temp Cande 7.364 7.350 - 7.450    Pco2 Temp Co 43.6 32.0 - 48.0 mmHg    Po2 Temp Cor 141 (H) 83 - 108 mmHg    Specimen Arterial    POCT sodium device results    Collection Time: 05/08/25 11:23 AM   Result Value Ref Range    Istat Sodium 138 135 - 145 mmol/L   POCT potassium device results    Collection Time: 05/08/25 11:23 AM   Result Value Ref Range    Istat Potassium 6.8 (HH) 3.6 - 5.5 mmol/L   POCT ionized CA device results    Collection Time: 05/08/25 11:23 AM   Result Value Ref Range    Istat Ionized Calcium 0.98 (L) 1.10 - 1.30 mmol/L   POCT hematocrit and hemoglobin device results    Collection Time: 05/08/25 11:23 AM   Result Value Ref Range    Istat Hematocrit 40 (L) 42 - 52 %    Istat Hemoglobin 13.6 (L) 14.0 - 18.0 g/dL   POCT activated clotting time device results    Collection Time: 05/08/25 11:28 AM   Result Value Ref Range    Istat Activated Clotting Time 504 (H) 74 - 137 sec   POCT arterial blood gas device results    Collection Time: 05/08/25 11:54 AM   Result Value Ref Range    Ph 7.269 (LL) 7.350 - 7.450    Pco2 48.3 (H) 32.0 - 48.0 mmHg     Po2 85 83 - 108 mmHg    Tco2 24 (L) 32 - 48 mmol/L    S02 95 93 - 99 %    Hco3 22.1 21.0 - 28.0 mmol/L    BE -5 (L) -4 - 3 mmol/L    Body Temp 36.5 C degrees    Ph Temp Cande 7.276 (LL) 7.350 - 7.450    Pco2 Temp Co 47.3 32.0 - 48.0 mmHg    Po2 Temp Cor 82 (L) 83 - 108 mmHg    Specimen Arterial    POCT sodium device results    Collection Time: 05/08/25 11:54 AM   Result Value Ref Range    Istat Sodium 140 135 - 145 mmol/L   POCT potassium device results    Collection Time: 05/08/25 11:54 AM   Result Value Ref Range    Istat Potassium 5.5 3.6 - 5.5 mmol/L   POCT ionized CA device results    Collection Time: 05/08/25 11:54 AM   Result Value Ref Range    Istat Ionized Calcium 1.25 1.10 - 1.30 mmol/L   POCT hematocrit and hemoglobin device results    Collection Time: 05/08/25 11:54 AM   Result Value Ref Range    Istat Hematocrit 38 (L) 42 - 52 %    Istat Hemoglobin 12.9 (L) 14.0 - 18.0 g/dL   POCT activated clotting time device results    Collection Time: 05/08/25 11:55 AM   Result Value Ref Range    Istat Activated Clotting Time 124 74 - 137 sec   Magnesium    Collection Time: 05/08/25 12:49 PM   Result Value Ref Range    Magnesium 2.7 (H) 1.5 - 2.5 mg/dL   Prothrombin time (INR)    Collection Time: 05/08/25 12:49 PM   Result Value Ref Range    PT 18.4 (H) 12.0 - 14.6 sec    INR 1.52 (H) 0.87 - 1.13   APTT (PTT)    Collection Time: 05/08/25 12:49 PM   Result Value Ref Range    APTT 53.2 (H) 24.7 - 36.0 sec   POCT arterial blood gas device results    Collection Time: 05/08/25 12:49 PM   Result Value Ref Range    Ph 7.307 (L) 7.350 - 7.450    Pco2 45.4 32.0 - 48.0 mmHg    Po2 281 (H) 83 - 108 mmHg    Tco2 24 (L) 32 - 48 mmol/L    S02 100 (H) 93 - 99 %    Hco3 22.7 21.0 - 28.0 mmol/L    BE -4 -4 - 3 mmol/L    Body Temp 36.0 C degrees    O2 Therapy 100 %    iPF Ratio 281     Ph Temp Cande 7.321 (L) 7.350 - 7.450    Pco2 Temp Co 43.5 32.0 - 48.0 mmHg    Po2 Temp Cor 277 (H) 83 - 108 mmHg    Specimen Arterial     Sonido Test  N/A     DelSys Vent     End Tidal Carbon Dioxide 35 mmhg    Peep End Expiratory Pressure 8 cmh20    Percent Minute Volume 160     Mode ASV    POCT sodium device results    Collection Time: 25 12:49 PM   Result Value Ref Range    Istat Sodium 141 135 - 145 mmol/L   POCT potassium device results    Collection Time: 25 12:49 PM   Result Value Ref Range    Istat Potassium 4.5 3.6 - 5.5 mmol/L   POCT ionized CA device results    Collection Time: 25 12:49 PM   Result Value Ref Range    Istat Ionized Calcium 1.20 1.10 - 1.30 mmol/L   POTASSIUM SERUM (K)    Collection Time: 25 12:49 PM   Result Value Ref Range    Potassium 4.6 3.6 - 5.5 mmol/L   EKG on arrival to CSU    Collection Time: 25  1:17 PM   Result Value Ref Range    Report       [facility]    Test Date:  2025  Pt Name:    SHYANNE SILVERIO              Department: 161  MRN:        [id number]                      Room:       13  Gender:     Male                         Technician: TALYA  :        1980                   Requested By:EARLE GA  Order #:    [id number]                    Reading MD:    Measurements  Intervals                                Axis  Rate:       66                           P:          77  AL:         170                          QRS:        79  QRSD:       104                          T:          55  QT:         450  QTc:        472    Interpretive Statements  Sinus rhythm  Inferior infarct, old  Compared to ECG 2025 19:15:48  Myocardial infarct finding now present         Imaging  DX-CHEST-PORTABLE (1 VIEW)   Final Result      1.  Recent median sternotomy.   2.  Tubes and lines in good position.   3.  Mild cardiomegaly.   4.  Left perihilar atelectasis.      DX-CHEST-2 VIEWS   Final Result      No active disease.      EC-ECHOCARDIOGRAM COMPLETE W/O CONT   Final Result      US-VEIN MAPPING LOWER EXTREMITY BILAT   Final Result      US-CAROTID DOPPLER BILAT   Final Result      CL-LEFT  HEART CATHETERIZATION WITH POSSIBLE INTERVENTION    (Results Pending)   EC-FABIO W/O CONT    (Results Pending)       Assessment/Plan  * CAD in native artery- (present on admission)  Assessment & Plan  MVCAD with critical LAD stenosis.   Post CABG echo findings postoperatively showed EF 55 to 60%.  Good RV function.  ASA/Plavix/BBL/Statin.      Encounter for weaning from ventilator ([facility])  Assessment & Plan  Post cardiac surgery ventilator management for acute postoperative respiratory insufficiency due to sternotomy, anesthesia, sedative, narcotic, and decreased mobility.   Titrate FiO2 to SpO2 92-96%  Vent bundle, vent weaning as per protocol.  Incentive spirometry  Increase activity; up to chair as tolerated.     Obstructive sleep apnea  Assessment & Plan  Known MIRELLA.  Monitor oxygen saturations at night.  May need home oxygen on discharge  Consider follow-up as outpatient with sleep medicine.    Mixed hyperlipidemia- (present on admission)  Assessment & Plan  On Statin  Continue    Obesity- (present on admission)  Assessment & Plan  BMI 36  Counseling    Asthma  Assessment & Plan  History of asthma.  As needed bronchodilators.        Discussed patient condition and risk of morbidity and/or mortality with RN, RT, Therapies, Pharmacy, and .    The patient remains critically ill.  Critical care time = 45 minutes in directly providing and coordinating critical care and extensive data review.  No time overlap and excludes procedures.

## 2025-05-08 NOTE — ANESTHESIA PROCEDURE NOTES
Airway    Date/Time: 5/8/2025 7:38 AM    Performed by: Armen Cortes M.D.  Authorized by: Armen Cortes M.D.    Location:  OR  Urgency:  Elective  Difficult Airway: No    Indications for Airway Management:  Anesthesia      Spontaneous Ventilation: absent    Sedation Level:  Deep  Preoxygenated: Yes    Patient Position:  Sniffing  Mask Difficulty Assessment:  1 - vent by mask  Final Airway Type:  Endotracheal airway  Final Endotracheal Airway:  ETT  Cuffed: Yes    Technique Used for Successful ETT Placement:  Direct laryngoscopy  Devices/Methods Used in Placement:  Intubating stylet    Insertion Site:  Oral  Blade Type:  Keen  Laryngoscope Blade/Videolaryngoscope Blade Size:  3  ETT Size (mm):  8.0  Measured from:  Lips  ETT to Lips (cm):  24  Placement Verified by: capnometry    Cormack-Lehane Classification:  Grade I - full view of glottis  Number of Attempts at Approach:  1

## 2025-05-08 NOTE — ANESTHESIA PROCEDURE NOTES
Arterial Line    Performed by: Armen Cortes M.D.  Authorized by: Armen Cortes M.D.    Start Time:  5/8/2025 7:40 AM  End Time:  5/8/2025 7:42 AM  Localization: ultrasound guidance and surface landmarks    Patient Location:  OR  Indication: continuous blood pressure monitoring and blood sampling needed        Catheter Size:  20 G  Seldinger Technique?: Yes    Laterality:  Left  Site:  Radial artery  Line Secured:  Antimicrobial disc, tape and transparent dressing  Events: patient tolerated procedure well with no complications

## 2025-05-08 NOTE — ANESTHESIA PROCEDURE NOTES
Peripheral IV    Date/Time: 5/8/2025 7:45 AM    Performed by: Armen Cortes M.D.  Authorized by: Armen Cortes M.D.    Size:  16 G  Laterality:  Left  Peripheral IV Location:  Upper arm  Local Anesthetic:  None  Site Prep:  Alcohol  Technique:  Ultrasound guided  Attempts:  1  Difficult IV necessitating physician skill: IV access difficult    Ultrasound Guidance: Yes

## 2025-05-08 NOTE — ANESTHESIA TIME REPORT
Anesthesia Start and Stop Event Times       Date Time Event    5/8/2025 0653 Ready for Procedure     0730 Anesthesia Start     1249 Anesthesia Stop          Responsible Staff  05/08/25      Name Role Begin End    Armen Cortes M.D. Anesth 0730 1249          Overtime Reason:  no overtime (within assigned shift)    Comments:

## 2025-05-08 NOTE — OP REPORT
OPERATIVE NOTE   Obinna Bryan   05/08/25              PRE-OP DIAGNOSIS: multivessel CAD with unstable angina, HTN, HLD, palpitations and high risk for post op arrhythmia with urgent surgery in setting of ACS with high CHADSVasc score of 2, obesity BMI 36, MIRELLA, fatty liver disease, exercise-induced asthma            POST-OP DIAGNOSIS: same            PROCEDURE:Coronary artery bypass grafting x4  Left internal mammary artery to left anterior descending artery  Reversed saphenous vein graft to ramus artery  Reversed saphenous vein graft to obtuse marginal artery #1  Reversed saphenous vein graft to posterior descending artery  Endo-vein procurement  Left Atrial appendage ligation with 40mm Atriclip           SURGEON: Dr. Elayne Valdovinos    FIRST ASSISTANT/ VEIN PROCUREMENT: GEORGIA Kessler            ANESTHESIA: General endotracheal, Dr. Armen Cortes    CARDIOPULMONARY BYPASS TIME: 95 minutes    AORTIC CROSSCLAMP TIME: 81 minutes                  DRAINS: mediastinal 24F desean, 32F chest tubes x2            SPECIMENS: none     FINDINGS: medium LIMA with great flow, good quality and length venous conduit, good targets with ramus being intramyocardial target, palpable proximal disease in all bypassed vessels, diagonal #2 evaluated for bypass but too small of an epicardial vessel           COMPLICATIONS: none identified            DISPOSITION: ICU            CONDITION: intubated, hemodynamically stable             Procedure details:      The patient was taken to the operating room. Standard monitoring lines and Roper catheter were placed. General anesthesia was induced. The patient was prepped and draped in sterile fashion. An endoscopic procurement of the greater saphenous vein in the right leg was carried out by GEORGIA Kessler. Standard median sternotomy was performed. The left internal mammary artery was taken down between cautery and clips. The patient was heparinized and the vessel divided distally. The  heart was exposed and pericardial traction sutures placed. The distal aortic and triple stage right atrial venous cannulation was performed. Antegrade cardioplegia catheter was placed. The patient was placed on cardiopulmonary bypass. The aorta was cross-clamped and the heart arrested with [place] cardioplegia. Myocardial protection was maintained with topical cooling.  The heart was elevated and left atrial appendage was measure at its base. It corresponded to a 40mm Atriclip. The Atriclip was applied without problems and appeared well positioned at the junction of the appendage and the left atrium.  The first graft was the reversed saphenous vein to the posterior descending artery. The artery at the chosen grafting site was exposed and entered. Then the anastomosis was performed in end-to-side fashion with 7-0 prolene suture. The second graft was the reversed saphenous vein to the obtuse marginal artery #1. The artery at the chosen grafting site was exposed and entered. Then the anastomosis was performed in end-to-side fashion with 7-0 prolene suture. The third graft was the reversed saphenous vein to the ramus artery; this turned out to be a completely intramyocardial target. The artery at the chosen grafting site was exposed and entered. Then the anastomosis was performed in end-to-side fashion with 7-0 prolene suture.The last graft was the left internal mammary artery to the left anterior descending artery. The artery at the chosen grafting site was exposed and entered. Then the anastomosis was performed in end-to-side fashion with 7-0 prolene suture.The pedicle was secured to the epicardium with two 6-0 Prolene sutures. The proximal anastomoses were done in end-to-side fashion utilizing single clamp technique.   The heart was allowed 10 minutes to re-perfuse and FABIO was used to verify appropriate wall motion. The patient was then weaned and  from cardiopulmonary bypass. Protamine was given to reverse  the heparin. The heart was decannulated. Ventricular pacing wires were placed. The chest tubes were placed. Hemostasis was secured then the sternum was closed using stainless steel wires. The incision was closed in three layers with sutures. Sterile dressings were placed. At the end of the operation, all sponge and needle counts were correct.

## 2025-05-08 NOTE — ANESTHESIA PREPROCEDURE EVALUATION
Case: [id number] Date/Time: 05/08/25 0715    Procedures:       CABG, WITH ENDOSCOPIC VEIN PROCUREMENT - X2-3      CLIPPING, LEFT ATRIAL APPENDAGE      ECHOCARDIOGRAM, TRANSESOPHAGEAL, INTRAOPERATIVE    Location: [facility] OR 03 / SURGERY [facility]    Surgeons: Elayne Valdovinos M.D.            Relevant Problems   CARDIAC   (positive) Agatston coronary artery calcium score greater than 400   (positive) CAD in native artery     No prior anesthetic complications. Appropriately NPO.    Vitals:    05/08/25 0634   BP: 107/65   Pulse: 62   Resp: 20   Temp: 35.9 °C (96.6 °F)   SpO2: 91%        Recent Labs     05/05/25  1504   WBC 7.8   RBC 5.34   HEMOGLOBIN 16.3   HEMATOCRIT 48.8   MCV 91.4   MCH 30.5   RDW 42.7   PLATELETCT 218   MPV 8.8*       Recent Labs     05/05/25  1504 05/08/25  0542   SODIUM 141 138   POTASSIUM 4.2 4.3   CHLORIDE 103 104   CO2 30 23   GLUCOSE 148* 114*   BUN 14 16         Physical Exam    Airway   Mallampati: II  TM distance: >3 FB  Neck ROM: full       Cardiovascular - normal exam  Rhythm: regular  Rate: normal  (-) murmur     Dental - normal exam           Pulmonary - normal exam  Breath sounds clear to auscultation     Abdominal    Neurological - normal exam                   Anesthesia Plan    ASA 4   ASA physical status 4 criteria: CAD - recent (< 3 months)    Plan - general       Airway plan will be ETT  FABIO Planned  (Plan for arterial line, central line, FABIO, and postoperative ventilation.)      Induction: intravenous    Postoperative Plan: Postoperative administration of opioids is intended.    Pertinent diagnostic labs and testing reviewed    Informed Consent:    Anesthetic plan and risks discussed with patient.    Use of blood products discussed with: patient whom consented to blood products.

## 2025-05-08 NOTE — ANESTHESIA PROCEDURE NOTES
Central Venous Line    Performed by: Armen Cortes M.D.  Authorized by: Armen Cortes M.D.    Start Time:  5/8/2025 7:50 AM  End Time:  5/8/2025 8:00 AM  Patient Location:  OR  Indication: central venous access and hemodynamic monitoring        provider hand hygiene performed prior to central venous catheter insertion, all 5 sterile barriers used (gloves, gown, cap, mask, large sterile drape) during central venous catheter insertion and skin prep agent completely dried prior to procedure    Patient Position:  Trendelenburg  Laterality:  Left  Site:  Subclavian  Prep:  Chlorhexidine  Catheter Size:  7 Fr  Catheter Length (cm):  20  Number of Lumens:  Triple lumen  target vein identified, needle advanced into vein and blood aspirated and guidewire advanced into vein    Seldinger Technique?: Yes    Ultrasound-Guided: surface landmarks    Intravenous Verification: venous blood return and chest x-ray pending    Other::  Verified by echo  all ports aspirated, all ports flushed easily, guidewire was removed intact, biopatch was applied, line was sutured in place and dressing was applied    Events: patient tolerated procedure well with no complications    PA Catheter Placed?: No

## 2025-05-08 NOTE — INTERVAL H&P NOTE
Consented Procedure: CABG, WITH ENDOSCOPIC VEIN PROCUREMENT - X2-3, CLIPPING, LEFT ATRIAL APPENDAGE, ECHOCARDIOGRAM, TRANSESOPHAGEAL, INTRAOPERATIVE  I have examined the patient, provided the risks, benefits, and alternatives to the procedure(s) indicated on the signed consent form, and the patient wishes to proceed.    H&P reviewed. The patient was examined and there are no changes to the H&P      Elayne Valdovinos M.D.  05/08/25 7:22 AM

## 2025-05-08 NOTE — ANESTHESIA PROCEDURE NOTES
FABIO    Date/Time: 5/8/2025 7:39 AM    Performed by: Armen Cortes M.D.  Authorized by: Armen Cortes M.D.    Start Time:5/8/2025 7:39 AM  Preanesthetic Checklist: patient identified, IV checked, site marked, risks and benefits discussed, surgical consent, monitors and equipment checked, pre-op evaluation and timeout performed    Indication for FABIO: diagnostic   Patient Location: OR  Intubated: Yes  Bite Block: Yes  Heart Visualized: Yes  Insertion: atraumatic    **See FULL FABIO report in patient's chart via CV Synapse**

## 2025-05-08 NOTE — PROGRESS NOTES
Pt brought up to [facility] T613 with Anesthesia and OR Team, met by ICU MD, this RN, RT, and RN Fariha. Vitals initiated, chest tubes to suction, all drips verified. Post op care initiated.

## 2025-05-08 NOTE — CARE PLAN
The patient is Watcher - Medium risk of patient condition declining or worsening    Shift Goals  Clinical Goals: OHS  Patient Goals: be ready for surgery  Family Goals: education, updates    Progress made toward(s) clinical / shift goals:    Problem: Knowledge Deficit - Standard  Goal: Patient and family/care givers will demonstrate understanding of plan of care, disease process/condition, diagnostic tests and medications  Outcome: Progressing     Problem: Communication  Goal: The ability to communicate needs accurately and effectively will improve  Outcome: Progressing     Problem: Hemodynamics  Goal: Patient's hemodynamics, fluid balance and neurologic status will be stable or improve  Outcome: Progressing     Problem: Pre Op  Goal: Optimal preparation for CABG/Heart Valve surgery  Outcome: Progressing  Intervention: Pre Op education to patient/significant other. Provide patient [facility] Patient Guideline for Cardiac Surgery (See Pt. Ed.)  Note: Completed by charge RN.  Intervention: Consents obtained for Surgery, Anesthesia and Transfusion/Bloodless Program Participant  Note: Consents printed. Transfusion signed and scanned.  Intervention: Pre op checklist completed.  Sign and held orders released. Admit profile completed. Advanced directive verified.  Note: Pre op checklist in the works, completed by this AM.  Intervention: Pre op labs per MD order: CBC, CMP, INR, PTT, COD and UA if not done in past 72 hours.  HbA1C if diabetic.  Note: Labs ordered.  Intervention: Pre op diagnostics per MD order (EKG, echo, cath, CXR, Bilateral carotid doppler study and vein mapping)  Note: Completed.  Intervention: Baseline assessment documented to include IS volume, weight, bilateral BP and peripheral pulses.  Note: Baselines completed and charted.  Intervention: NPO at midnight except cardiac medications and PowerAde drink 2 hours prior to surgery (NO ASA, Coumadin, Plavix, Lovenox, or ACE/ARB)  Note: NPO at  midnight.  Intervention: Shower with Chlorhexidine x 2 (12 hours apart), night before surgery and morning of surgery  Note: Showered and shaved with chlorhexidine night before surgery. Will complete morning of surgery in the AM.   Intervention: Prep with clippers - chin to toes bedline  Note: Clipped from chin to toes.  Intervention: Remove dentures, valuables, jewelry, piercings, contacts, dentures and hearing aids  Note: Removed.    Intervention: CABG patients - Determine if patient is taking beta blockers  Note: Assessed.    Intervention: Beta blocker, gabapentin and Tylenol given at least 2 hours prior to surgery  Note: To be given before surgery.    Intervention: If diabetic, administer insulin night before surgery  Note: N/a  Intervention: If diabetic, FSBS in am and follow sliding scale if indicated  Note: N/a  Intervention: Pre op antibiotics ordered and on MAR  Note: Assessed.  Intervention: Chart back and consents sent to surgery with patient  Note: Completed.  Intervention: Transport to surgery with cardiac monitor, oxygen, and ensure hand off report is given to pre op RN  Note: Will be completed in the AM before going down to surgery.     Problem: Self Care  Goal: Patient will have the ability to perform ADLs independently or with assistance (bathe, groom, dress, toilet and feed)  Outcome: Progressing

## 2025-05-08 NOTE — ASSESSMENT & PLAN NOTE
Patient is post cardiac surgery performed by: Dr Elayne Valdovinos on 5/8 date.   Procedure: Coronary artery bypass grafting x4  Left internal mammary artery to left anterior descending artery  Reversed saphenous vein graft to ramus artery  Reversed saphenous vein graft to obtuse marginal artery #1  Reversed saphenous vein graft to posterior descending artery  Endo-vein procurement  Left Atrial appendage ligation with 40mm Atriclip  Will monitor hemodynamics and titrate pressor and inotropic support.   Will follow chest tube output and correct coagulopathy.   Will monitor for post cardiac surgery complication such as myocardial dysfunction, bleeding, tamponade, graft dysfunction, arrhythmia, respiratory failure, neurologic, renal and infectious complication.    Will also monitor and treat stress hyperglycemia.

## 2025-05-09 ENCOUNTER — APPOINTMENT (OUTPATIENT)
Dept: RADIOLOGY | Facility: MEDICAL CENTER | Age: 45
DRG: 234 | End: 2025-05-09
Attending: NURSE PRACTITIONER
Payer: COMMERCIAL

## 2025-05-09 LAB
ANION GAP SERPL CALC-SCNC: 8 MMOL/L (ref 7–16)
BUN SERPL-MCNC: 20 MG/DL (ref 8–22)
CALCIUM SERPL-MCNC: 7.7 MG/DL (ref 8.5–10.5)
CHLORIDE SERPL-SCNC: 105 MMOL/L (ref 96–112)
CO2 SERPL-SCNC: 24 MMOL/L (ref 20–33)
CREAT SERPL-MCNC: 0.98 MG/DL (ref 0.5–1.4)
EKG IMPRESSION: NORMAL
ERYTHROCYTE [DISTWIDTH] IN BLOOD BY AUTOMATED COUNT: 42.3 FL (ref 35.9–50)
GFR SERPLBLD CREATININE-BSD FMLA CKD-EPI: 96 ML/MIN/1.73 M 2
GLUCOSE BLD STRIP.AUTO-MCNC: 100 MG/DL (ref 65–99)
GLUCOSE BLD STRIP.AUTO-MCNC: 100 MG/DL (ref 65–99)
GLUCOSE BLD STRIP.AUTO-MCNC: 107 MG/DL (ref 65–99)
GLUCOSE BLD STRIP.AUTO-MCNC: 109 MG/DL (ref 65–99)
GLUCOSE BLD STRIP.AUTO-MCNC: 110 MG/DL (ref 65–99)
GLUCOSE BLD STRIP.AUTO-MCNC: 118 MG/DL (ref 65–99)
GLUCOSE BLD STRIP.AUTO-MCNC: 120 MG/DL (ref 65–99)
GLUCOSE BLD STRIP.AUTO-MCNC: 124 MG/DL (ref 65–99)
GLUCOSE BLD STRIP.AUTO-MCNC: 128 MG/DL (ref 65–99)
GLUCOSE BLD STRIP.AUTO-MCNC: 130 MG/DL (ref 65–99)
GLUCOSE BLD STRIP.AUTO-MCNC: 133 MG/DL (ref 65–99)
GLUCOSE BLD STRIP.AUTO-MCNC: 134 MG/DL (ref 65–99)
GLUCOSE BLD STRIP.AUTO-MCNC: 143 MG/DL (ref 65–99)
GLUCOSE BLD STRIP.AUTO-MCNC: 147 MG/DL (ref 65–99)
GLUCOSE BLD STRIP.AUTO-MCNC: 149 MG/DL (ref 65–99)
GLUCOSE BLD STRIP.AUTO-MCNC: 151 MG/DL (ref 65–99)
GLUCOSE BLD STRIP.AUTO-MCNC: 155 MG/DL (ref 65–99)
GLUCOSE BLD STRIP.AUTO-MCNC: 157 MG/DL (ref 65–99)
GLUCOSE BLD STRIP.AUTO-MCNC: 166 MG/DL (ref 65–99)
GLUCOSE BLD STRIP.AUTO-MCNC: 168 MG/DL (ref 65–99)
GLUCOSE BLD STRIP.AUTO-MCNC: 175 MG/DL (ref 65–99)
GLUCOSE BLD STRIP.AUTO-MCNC: 175 MG/DL (ref 65–99)
GLUCOSE BLD STRIP.AUTO-MCNC: 176 MG/DL (ref 65–99)
GLUCOSE BLD STRIP.AUTO-MCNC: 178 MG/DL (ref 65–99)
GLUCOSE BLD STRIP.AUTO-MCNC: 179 MG/DL (ref 65–99)
GLUCOSE BLD STRIP.AUTO-MCNC: 182 MG/DL (ref 65–99)
GLUCOSE BLD STRIP.AUTO-MCNC: 185 MG/DL (ref 65–99)
GLUCOSE BLD STRIP.AUTO-MCNC: 204 MG/DL (ref 65–99)
GLUCOSE BLD STRIP.AUTO-MCNC: 98 MG/DL (ref 65–99)
GLUCOSE SERPL-MCNC: 200 MG/DL (ref 65–99)
HCT VFR BLD AUTO: 37.2 % (ref 42–52)
HGB BLD-MCNC: 12.7 G/DL (ref 14–18)
MCH RBC QN AUTO: 31.2 PG (ref 27–33)
MCHC RBC AUTO-ENTMCNC: 34.1 G/DL (ref 32.3–36.5)
MCV RBC AUTO: 91.4 FL (ref 81.4–97.8)
PLATELET # BLD AUTO: 145 K/UL (ref 164–446)
PMV BLD AUTO: 8.8 FL (ref 9–12.9)
POTASSIUM SERPL-SCNC: 4.2 MMOL/L (ref 3.6–5.5)
POTASSIUM SERPL-SCNC: 4.6 MMOL/L (ref 3.6–5.5)
RBC # BLD AUTO: 4.07 M/UL (ref 4.7–6.1)
SODIUM SERPL-SCNC: 137 MMOL/L (ref 135–145)
WBC # BLD AUTO: 13.4 K/UL (ref 4.8–10.8)

## 2025-05-09 PROCEDURE — 93005 ELECTROCARDIOGRAM TRACING: CPT | Mod: TC | Performed by: NURSE PRACTITIONER

## 2025-05-09 PROCEDURE — 99024 POSTOP FOLLOW-UP VISIT: CPT | Performed by: NURSE PRACTITIONER

## 2025-05-09 PROCEDURE — C9248 INJ, CLEVIDIPINE BUTYRATE: HCPCS | Mod: JZ | Performed by: NURSE PRACTITIONER

## 2025-05-09 PROCEDURE — A9270 NON-COVERED ITEM OR SERVICE: HCPCS | Performed by: NURSE PRACTITIONER

## 2025-05-09 PROCEDURE — 82962 GLUCOSE BLOOD TEST: CPT | Mod: 91

## 2025-05-09 PROCEDURE — 700102 HCHG RX REV CODE 250 W/ 637 OVERRIDE(OP): Performed by: NURSE PRACTITIONER

## 2025-05-09 PROCEDURE — 71045 X-RAY EXAM CHEST 1 VIEW: CPT

## 2025-05-09 PROCEDURE — 700102 HCHG RX REV CODE 250 W/ 637 OVERRIDE(OP): Performed by: THORACIC SURGERY (CARDIOTHORACIC VASCULAR SURGERY)

## 2025-05-09 PROCEDURE — 93010 ELECTROCARDIOGRAM REPORT: CPT | Performed by: INTERNAL MEDICINE

## 2025-05-09 PROCEDURE — 85027 COMPLETE CBC AUTOMATED: CPT

## 2025-05-09 PROCEDURE — 99291 CRITICAL CARE FIRST HOUR: CPT | Performed by: INTERNAL MEDICINE

## 2025-05-09 PROCEDURE — 770022 HCHG ROOM/CARE - ICU (200)

## 2025-05-09 PROCEDURE — 306310 ANTI-EMBOLISM STOCKINGS XXLRG REG: Performed by: NURSE PRACTITIONER

## 2025-05-09 PROCEDURE — 700105 HCHG RX REV CODE 258: Performed by: NURSE PRACTITIONER

## 2025-05-09 PROCEDURE — 80048 BASIC METABOLIC PNL TOTAL CA: CPT

## 2025-05-09 PROCEDURE — 700111 HCHG RX REV CODE 636 W/ 250 OVERRIDE (IP): Performed by: NURSE PRACTITIONER

## 2025-05-09 PROCEDURE — 84132 ASSAY OF SERUM POTASSIUM: CPT

## 2025-05-09 RX ORDER — DEXTROSE MONOHYDRATE 25 G/50ML
25 INJECTION, SOLUTION INTRAVENOUS
Status: DISCONTINUED | OUTPATIENT
Start: 2025-05-09 | End: 2025-05-10

## 2025-05-09 RX ORDER — LIDOCAINE HYDROCHLORIDE 20 MG/ML
INJECTION, SOLUTION INTRAVENOUS
Status: DISCONTINUED | OUTPATIENT
Start: 2025-05-09 | End: 2025-05-12 | Stop reason: HOSPADM

## 2025-05-09 RX ORDER — POTASSIUM CHLORIDE 7.45 MG/ML
10 INJECTION INTRAVENOUS ONCE
Status: COMPLETED | OUTPATIENT
Start: 2025-05-09 | End: 2025-05-09

## 2025-05-09 RX ORDER — INSULIN LISPRO 100 [IU]/ML
2-9 INJECTION, SOLUTION INTRAVENOUS; SUBCUTANEOUS
Status: DISCONTINUED | OUTPATIENT
Start: 2025-05-09 | End: 2025-05-10

## 2025-05-09 RX ADMIN — CLEVIPIDINE 12 MG/HR: 0.5 EMULSION INTRAVENOUS at 00:39

## 2025-05-09 RX ADMIN — ACETAMINOPHEN 1000 MG: 500 TABLET ORAL at 23:06

## 2025-05-09 RX ADMIN — OXYCODONE HYDROCHLORIDE 10 MG: 10 TABLET ORAL at 20:20

## 2025-05-09 RX ADMIN — MAGNESIUM SULFATE IN DEXTROSE 1 G: 10 INJECTION, SOLUTION INTRAVENOUS at 06:03

## 2025-05-09 RX ADMIN — SENNOSIDES AND DOCUSATE SODIUM 2 TABLET: 50; 8.6 TABLET ORAL at 17:15

## 2025-05-09 RX ADMIN — ASPIRIN 81 MG: 81 TABLET, COATED ORAL at 06:05

## 2025-05-09 RX ADMIN — METOPROLOL TARTRATE 12.5 MG: 25 TABLET, FILM COATED ORAL at 18:00

## 2025-05-09 RX ADMIN — POTASSIUM CHLORIDE 10 MEQ: 7.46 INJECTION, SOLUTION INTRAVENOUS at 08:55

## 2025-05-09 RX ADMIN — Medication 1 APPLICATOR: at 08:50

## 2025-05-09 RX ADMIN — OXYCODONE HYDROCHLORIDE 10 MG: 10 TABLET ORAL at 16:31

## 2025-05-09 RX ADMIN — ACETAMINOPHEN 1000 MG: 500 TABLET ORAL at 00:18

## 2025-05-09 RX ADMIN — OXYCODONE HYDROCHLORIDE 10 MG: 10 TABLET ORAL at 10:20

## 2025-05-09 RX ADMIN — POLYETHYLENE GLYCOL 3350 1 PACKET: 17 POWDER, FOR SOLUTION ORAL at 06:03

## 2025-05-09 RX ADMIN — INSULIN LISPRO 4 UNITS: 100 INJECTION, SOLUTION INTRAVENOUS; SUBCUTANEOUS at 12:59

## 2025-05-09 RX ADMIN — TRAMADOL HYDROCHLORIDE 50 MG: 50 TABLET, COATED ORAL at 11:56

## 2025-05-09 RX ADMIN — ENOXAPARIN SODIUM 40 MG: 100 INJECTION SUBCUTANEOUS at 17:17

## 2025-05-09 RX ADMIN — SODIUM CHLORIDE: 9 INJECTION, SOLUTION INTRAVENOUS at 06:01

## 2025-05-09 RX ADMIN — METOPROLOL TARTRATE 12.5 MG: 25 TABLET, FILM COATED ORAL at 08:49

## 2025-05-09 RX ADMIN — OXYCODONE HYDROCHLORIDE 10 MG: 10 TABLET ORAL at 13:33

## 2025-05-09 RX ADMIN — TRAMADOL HYDROCHLORIDE 50 MG: 50 TABLET, COATED ORAL at 08:48

## 2025-05-09 RX ADMIN — OXYCODONE HYDROCHLORIDE 10 MG: 10 TABLET ORAL at 23:55

## 2025-05-09 RX ADMIN — TRAMADOL HYDROCHLORIDE 50 MG: 50 TABLET, COATED ORAL at 23:05

## 2025-05-09 RX ADMIN — OMEPRAZOLE 20 MG: 20 CAPSULE, DELAYED RELEASE ORAL at 06:05

## 2025-05-09 RX ADMIN — CLOPIDOGREL BISULFATE 75 MG: 75 TABLET, FILM COATED ORAL at 06:05

## 2025-05-09 RX ADMIN — OXYCODONE HYDROCHLORIDE 10 MG: 10 TABLET ORAL at 06:05

## 2025-05-09 RX ADMIN — ACETAMINOPHEN 1000 MG: 500 TABLET ORAL at 17:15

## 2025-05-09 RX ADMIN — OXYCODONE HYDROCHLORIDE 10 MG: 10 TABLET ORAL at 00:18

## 2025-05-09 RX ADMIN — POTASSIUM CHLORIDE 10 MEQ: 7.46 INJECTION, SOLUTION INTRAVENOUS at 00:18

## 2025-05-09 RX ADMIN — ACETAMINOPHEN 1000 MG: 500 TABLET ORAL at 06:05

## 2025-05-09 RX ADMIN — ROSUVASTATIN CALCIUM 20 MG: 20 TABLET, FILM COATED ORAL at 20:21

## 2025-05-09 RX ADMIN — Medication 1 APPLICATOR: at 20:22

## 2025-05-09 RX ADMIN — ACETAMINOPHEN 1000 MG: 500 TABLET ORAL at 11:57

## 2025-05-09 RX ADMIN — SENNOSIDES AND DOCUSATE SODIUM 2 TABLET: 50; 8.6 TABLET ORAL at 06:05

## 2025-05-09 ASSESSMENT — ENCOUNTER SYMPTOMS
TINGLING: 0
VOMITING: 0
DOUBLE VISION: 0
SHORTNESS OF BREATH: 0
SORE THROAT: 0
COUGH: 0
HEADACHES: 0
ABDOMINAL PAIN: 0
HALLUCINATIONS: 0
SENSORY CHANGE: 0
NAUSEA: 0
FEVER: 0
SPUTUM PRODUCTION: 0
SPEECH CHANGE: 0
FOCAL WEAKNESS: 0
DEPRESSION: 0

## 2025-05-09 ASSESSMENT — PAIN DESCRIPTION - PAIN TYPE
TYPE: ACUTE PAIN;SURGICAL PAIN
TYPE: SURGICAL PAIN
TYPE: ACUTE PAIN;SURGICAL PAIN
TYPE: SURGICAL PAIN
TYPE: ACUTE PAIN;SURGICAL PAIN
TYPE: SURGICAL PAIN
TYPE: ACUTE PAIN;SURGICAL PAIN
TYPE: SURGICAL PAIN

## 2025-05-09 ASSESSMENT — FIBROSIS 4 INDEX: FIB4 SCORE: 1.76

## 2025-05-09 NOTE — CARE PLAN
The patient is Watcher - Medium risk of patient condition declining or worsening    Shift Goals  Clinical Goals: Wean off vasoactive medications, pain control, mobilize.  Patient Goals: Pain control, sleep.  Family Goals: Visit at bedside.    Progress made toward(s) clinical / shift goals:      Problem: Day of surgery post CABG/Heart valve replacement  Goal: Stabilization in immediate post op period  Intervention: VS q 15 min x 4 hours, then q 1 hour. Include temperature immediately upon arrival. Check CO/CI q 2-4 hours and PRN  Note: VS taken and recorded Q 15 minutes while pt remains on vasoactive medications.  Intervention: If radial artery used, elevate arm, no BP checks or needle sticks from affected arm, monitor ulnar pulse and capillary refill  Note: Left radial art line in use. Left arm elevated. No needle sticks/ BP cuff to left arm. Left hand warm, pink, with BCR. +2 radial pulse.   Intervention: First post op hour labs and EKG per order  Note: Completed on day shift.  Intervention: Serum K q 6 hours x 24 hours.  ABG and CBC prn.  Note: Q 6 hour serum potassium checked. Potassium replaced per ordered scale. Pt required 10 mEq KCl replacement at midnight.   Intervention: Initiate post cardiac insulin infusion protocol orders for FSBS greater than 140 and check frequency per protocol  Note: Post cardiac insulin infusion protocol initiated at 2300. Pt's BG increased to 185.  Intervention: FSBS frequency as per Cardiac Surgery Insulin Drip Protocol  Note: Q 1 hour BS checked. Insulin drip titrated per order.   Intervention: Chest tube to 20 cm suction, record CT drainage with VS, and check for air leak  Note: Chest tubes to - 20 cm suction. Chest tubes patent. No air leaks noted. Drainage serosanguinous. Mediastinal chest tubes averaging 20-30 ml out every hour.   Intervention: For CT drainage >300 mL in first hour post op and/or 150 mL in subsequent hours: Stat platelets, PT, INR, TEG, iSTAT, and H&H per  order  Note: N/A.  Intervention: Titrate and wean off vasoactive drips per patient's condition and per MD order while maintaining SBP  mmHg per MD order  Note: Pt has been on/ off of both norepinephrine and clevidipine to maintain SBP goals  mmHg. Currently pt is off all vasoactive drips.   Intervention: VAP protocol in place  Note: Completed on day shift.  Intervention: Wean from Vent per protocol (see protocol), extubation goal within 6 hours post op  Note: Completed on day shift.  Intervention: Bedrest until extubated and groin lines out  Note: Completed on day shift.  Intervention: Dangle within 4 hours post extubation  Note: Pt was dangled at EOB 2 hours post- extubation. Pt tolerated well. VSS. Pt experienced some pain and dizziness.   Intervention: Up in chair 4 hours, day of extubation  Note: N/A.  Intervention: Maintain all original surgical dressings per provider orders and specifications  Note: Midline sternal incision with Prevena intact and in place. RLE ace wrap intact and in place. No drainage noted.   Intervention: Clear liquids post extubation, order carbohydrate free (post cardiac surgery) diet, advance as tolerated  Note: Pt passed bedside RN swallow evaluation. Pt tolerating sips and chips well. No dysphagia noted. No nausea/ vomiting. Clear liquid, diabetic diet ordered.   Intervention: A-Fib and DVT prophylaxis per MD order or contraindications documented (refer to DVT/VTE problem on Care Plan)  Note: SCD's in place and on while pt is in bed through the night. Taz hose to be placed in AM before ambulating up out of bed.

## 2025-05-09 NOTE — PROGRESS NOTES
Patient alert, following. Repeat ABG complete, On 1 pressor.     RSBI 37  Pinsp 5  RR 14  PEEP 8  FiO2 30%  NIF -30   VC 2.3   PACO2 39.2  pH  7.38  SpO2 96%     Hemodynamics stable on current settings.     Extubated with no acute events @1840.     Total intubation time was 5 hours and 55 minutes.

## 2025-05-09 NOTE — DIETARY
Nutrition Services: Diet Education Consult   Day 3 of admit.  Obinna Bryan is a 45 y.o. male with admitting DX of Abnormal result of other cardiovascular function study [R94.39]  CAD in native artery [I25.10]    RD received referral for cardiac diet education. Pt is POD #1 CABG x4, current diet order is clear liquids, CHO-free. Dx includes CAD. A1c is elevated at 5.9%. RD provided information via discharge instructions which includes nutrition recommendations and tips to support a heart healthy dietary consistent CHO pattern. This includes outpatient resources to [facility] affiliated [facility] for continued nutrition education and guidance as desired.     No other education needs identified at this time. Please re-consult RD for supplemental education or at the request of patient.    Please re-consult RD PRN

## 2025-05-09 NOTE — CARE PLAN
Problem: Knowledge Deficit - Standard  Goal: Patient and family/care givers will demonstrate understanding of plan of care, disease process/condition, diagnostic tests and medications  Outcome: Progressing     Problem: Day of surgery post CABG/Heart valve replacement  Goal: Stabilization in immediate post op period  Outcome: Progressing  Intervention: VS q 15 min x 4 hours, then q 1 hour. Include temperature immediately upon arrival. Check CO/CI q 2-4 hours and PRN  Note: All completed, see vitals flowsheets. No CO/CI, no swan in place.   Intervention: If radial artery used, elevate arm, no BP checks or needle sticks from affected arm, monitor ulnar pulse and capillary refill  Note: Complete.   Intervention: First post op hour labs and EKG per order  Note: Both completed, see results.   Intervention: Serum K q 6 hours x 24 hours.  ABG and CBC prn.  Note: K+ not requiring replacement at this time. ABGs and CBC completed as indicated.   Intervention: Initiate post cardiac insulin infusion protocol orders for FSBS greater than 140 and check frequency per protocol  Note: Insulin gtt not initiated, BS remained < 180 during recovery.   Intervention: FSBS frequency as per Cardiac Surgery Insulin Drip Protocol  Note: N/A at this time, BS remained under 180.   Intervention: For patients on Beta Blockers: verify dose given prior to surgery or within 6 hours after arrival to the unit  Note: Patient takes 25 mg of metoprolol daily at home. Patient received 12.5 mg prior to surgery.   Intervention: Chest tube to 20 cm suction, record CT drainage with VS, and check for air leak  Note: Mediastinal chest tube total: 250  Santy tube total: 86  No air leak in either chest tube.   Intervention: For CT drainage >300 mL in first hour post op and/or 150 mL in subsequent hours: Stat platelets, PT, INR, TEG, iSTAT, and H&H per order  Note: Drainage did not exceed these parameters.   Intervention: Titrate and wean off vasoactive drips per  patient's condition and per MD order while maintaining SBP  mmHg per MD order  Note: 2L of plasmalyte given, levo @ 0.04. On and off levo all shift.   Intervention: VAP protocol in place  Note: Complete.   Intervention: Wean from Vent per protocol (see protocol), extubation goal within 6 hours post op  Note: In progress. Patient extubated @ 1840  Intervention: IS q 1 hour while awake post extubation  Note: Not complete. Extubated @ 1840.   Intervention: Bedrest until extubated and groin lines out  Note: Complete. No groin lines. Extubated @ 1840.  Intervention: Dangle within 4 hours post extubation  Note: Not complete. Extubated @ 1840.   Intervention: Up in chair 4 hours, day of extubation  Note: N/A. Extubated @ 1840.   Intervention: Maintain all original surgical dressings per provider orders and specifications  Note: Prevena to remain in place. ACE wrap still in place.   Intervention: Clear liquids post extubation, order carbohydrate free (post cardiac surgery) diet, advance as tolerated  Note: Extubated @ 1840. Pending Swallow.   Intervention: Discontinue Deposit zoe and arterial line 12-18 hours post op if hemodynamically stable and off vasoactive drips  Note: N/A no swan. Art line to remain  in place at this time.   Intervention: A-Fib and DVT prophylaxis per MD order or contraindications documented (refer to DVT/VTE problem on Care Plan)  Note: SCDs ordered.   Intervention: Amiodarone protocol per MD order  Note: N/A,.      Problem: Pain - Standard  Goal: Alleviation of pain or a reduction in pain to the patient’s comfort goal  Outcome: Progressing   The patient is Watcher - Medium risk of patient condition declining or worsening    Shift Goals  Clinical Goals: post op open heart care  Patient Goals: satinder  Family Goals: Updates    Progress made toward(s) clinical / shift goals:  medicated per mar, pending extubation. Weaning pressors. Awake and alert.     Patient is not progressing towards the following  goals:

## 2025-05-09 NOTE — PROGRESS NOTES
Critical Care Progress Note    Date of admission  5/6/2025    Chief Complaint  45 y.o. male admitted 5/6/2025 with past medical history significant for dyslipidemia history of childhood asthma, obstructive sleep apnea who has presented after having CABG x 4 done 5/8/2025 by Dr. Elayne Valdovinos.  Patient had an uneventful perioperative course.  He was paced for a few minutes postoperatively.  V wires in place.  Pacer is off currently.  He received crystalloids 1.5 L intraoperatively and currently on norepinephrine and Precedex infusions.  He is post CABG EF was 55 to 60% in the RV function was okay.     Hospital Course  5/8 post op from CABG    Interval Problem Update  Reviewed last 24 hour events:  Neuro: rass -1 to 0, oxy 10  HR: 70's  SBP:  no gtts  Tmax: afebrile  GI: insulin 1.5, BM pta  UOP: 4.2L   Lines: CT w/ 266ml, central line, peripheral IV, domínguez  Resp: 3l n/c, 3750 IS  Vte: lovenox  PPI/H2:PPI  Antibx: none  +7.1L net +8.2L   Level 4 mobility  Transition off insulin gtt this afternoon  Social work    Review of Systems  Review of Systems   Constitutional:  Positive for malaise/fatigue. Negative for fever.   HENT:  Negative for sore throat.    Eyes:  Negative for double vision.   Respiratory:  Negative for cough, sputum production and shortness of breath.    Cardiovascular:  Positive for chest pain.   Gastrointestinal:  Negative for abdominal pain, nausea and vomiting.   Neurological:  Negative for tingling, sensory change, speech change, focal weakness and headaches.   Psychiatric/Behavioral:  Negative for depression and hallucinations.         Vital Signs for last 24 hours   Temp:  [35.4 °C (95.7 °F)-37.1 °C (98.8 °F)] 37 °C (98.6 °F)  Pulse:  [63-86] 67  Resp:  [10-41] 34  BP: ()/(47-70) 102/59  SpO2:  [93 %-99 %] 96 %    Hemodynamic parameters for last 24 hours  CVP:  [3 MM HG-16 MM HG] 16 MM HG    Respiratory Information for the last 24 hours  Vent Mode: Spont  PEEP/CPAP: 8  P Support (PS  + PEEP): 5  MAP: 10  Control VTE (exp VT): 535    Physical Exam   Physical Exam  Vitals and nursing note reviewed.   Constitutional:       General: He is not in acute distress.     Appearance: Normal appearance. He is not ill-appearing.      Comments: Sitting up in bed with friend at bedside   HENT:      Head: Normocephalic.      Mouth/Throat:      Mouth: Mucous membranes are moist.   Eyes:      Pupils: Pupils are equal, round, and reactive to light.   Cardiovascular:      Rate and Rhythm: Normal rate.      Heart sounds:      Friction rub present.   Pulmonary:      Effort: No respiratory distress.      Breath sounds: No stridor. No wheezing or rhonchi.      Comments: Some limited excursion from splinting no w/r/r CT and wires in place  Abdominal:      General: There is no distension.      Palpations: There is no mass.      Tenderness: There is no abdominal tenderness.      Hernia: No hernia is present.   Musculoskeletal:         General: No swelling or tenderness.      Cervical back: No rigidity or tenderness.      Comments: Ace wrap on right leg   Skin:     Coloration: Skin is not jaundiced or pale.      Comments: Warm ext   Neurological:      General: No focal deficit present.      Mental Status: He is alert and oriented to person, place, and time.      Cranial Nerves: No cranial nerve deficit.      Sensory: No sensory deficit.      Motor: No weakness.      Coordination: Coordination normal.   Psychiatric:         Mood and Affect: Mood normal.         Medications  Current Facility-Administered Medications   Medication Dose Route Frequency Provider Last Rate Last Admin    insulin lispro (HumaLOG,AdmeLOG) subcutaneous injection  2-9 Units Subcutaneous 4X/DAY ACHS Jose R Osorio A.P.R.N.        And    dextrose 50 % (D50W) injection 25 g  25 g Intravenous Q15 MIN PRN TARIQ Hernandez.P.R.N.        Respiratory Therapy Consult   Nebulization Continuous RT TARIQ Hernandez.P.R.N.        NS infusion   Intravenous  Continuous Jose R Osorio, A.P.R.N. 10 mL/hr at 05/09/25 0601 New Bag at 05/09/25 0601    enoxaparin (Lovenox) inj 40 mg  40 mg Subcutaneous DAILY AT 1800 Jose R Osorio, A.P.R.N.        Nozin nasal  swab  1 Applicator Each Nostril BID Jose R Osorio, A.P.R.N.   1 Applicator at 05/09/25 0850    magnesium sulfate in D5W IVPB premix 1 g  1 g Intravenous DAILY Jose R Osorio, A.P.R.N.   Stopped at 05/09/25 0703    K+ Scale: Goal of 4.5  1 Each Intravenous Q6HRS Jose R Osorio, A.P.R.N.   1 Each at 05/09/25 0500    metoprolol tartrate (Lopressor) tablet 12.5 mg  12.5 mg Oral BID Jose R Osorio, A.P.R.N.   12.5 mg at 05/09/25 0849    Followed by    [START ON 5/10/2025] metoprolol tartrate (Lopressor) tablet 25 mg  25 mg Oral BID Jose R Osorio, A.P.R.N.        aspirin EC tablet 81 mg  81 mg Oral DAILY Jose R Osorio, A.P.R.N.   81 mg at 05/09/25 0605    clopidogrel (Plavix) tablet 75 mg  75 mg Oral DAILY Jose R Osorio, A.P.R.N.   75 mg at 05/09/25 0605    acetaminophen (Tylenol) tablet 1,000 mg  1,000 mg Oral Q6HRS Jose R Osorio, A.P.R.N.   1,000 mg at 05/09/25 0605    Followed by    [START ON 5/18/2025] acetaminophen (Tylenol) tablet 1,000 mg  1,000 mg Oral Q6HRS PRN Jose R Osorio, A.P.R.N.        oxyCODONE immediate-release (Roxicodone) tablet 5 mg  5 mg Oral Q3HRS PRN Jose R Osorio, A.P.R.N.   5 mg at 05/08/25 2044    Or    oxyCODONE immediate release (Roxicodone) tablet 10 mg  10 mg Oral Q3HRS PRN Jose R Osorio, A.P.R.N.   10 mg at 05/09/25 1020    Or    fentaNYL (Sublimaze) injection 50 mcg  50 mcg Intravenous Q3HRS PRN Jose R Osorio A.P.R.N.   50 mcg at 05/08/25 2217    traMADol (Ultram) 50 MG tablet 50 mg  50 mg Oral Q4HRS PRN Jose R Osorio A.P.R.N.   50 mg at 05/09/25 0848    ondansetron (Zofran) syringe/vial injection 8 mg  8 mg Intravenous Q6HRS PRN Jose R Osorio, A.P.R.N.        Or    prochlorperazine (Compazine) injection 10 mg  10 mg Intravenous Q6HRS PRN Jose R Osorio, A.P.R.N.        acetaminophen  (Tylenol) tablet 650 mg  650 mg Oral Q4HRS PRN Jose R W Ashleyding, A.P.R.N.        Or    acetaminophen (Tylenol) suppository 650 mg  650 mg Rectal Q4HRS PRN Jose R W Kading, A.P.R.N.        senna-docusate (Pericolace Or Senokot S) 8.6-50 MG per tablet 2 Tablet  2 Tablet Oral BID Jose R Osorio, A.P.R.N.   2 Tablet at 05/09/25 0605    And    polyethylene glycol/lytes (Miralax) Packet 1 Packet  1 Packet Oral DAILY Jose R JULIA Osorio, A.P.R.N.   1 Packet at 05/09/25 0603    And    [START ON 5/10/2025] magnesium hydroxide (Milk Of Magnesia) suspension 30 mL  30 mL Oral DAILY Jose R W Kading, A.P.R.N.        And    bisacodyl (Dulcolax) suppository 10 mg  10 mg Rectal QDAY PRN Jose R Osorio, A.P.R.N.        omeprazole (PriLOSEC) capsule 20 mg  20 mg Oral DAILY Jose R W Kading, A.P.R.N.   20 mg at 05/09/25 0605    mag hydrox-al hydrox-simeth (Maalox Plus Es Or Mylanta Ds) suspension 30 mL  30 mL Oral Q4HRS PRN Jose R Osorio, A.P.R.N.        rosuvastatin (Crestor) tablet 20 mg  20 mg Oral QHS Jose R Osorio, A.P.R.N.   20 mg at 05/08/25 2159    insulin lispro (HumaLOG,AdmeLOG) subcutaneous injection  0-14 Units Subcutaneous TID AC Elayne Valdovinos M.D.        insulin regular (HumuLIN R/NovoLIN R) 100 Units in  mL infusion premix  0-29 Units/hr Intravenous Continuous Elayne Valdovinos M.D.   Held at 05/09/25 0900       Fluids    Intake/Output Summary (Last 24 hours) at 5/9/2025 1043  Last data filed at 5/9/2025 0600  Gross per 24 hour   Intake 43098.19 ml   Output 5381 ml   Net 7548.19 ml       Laboratory  Recent Labs     05/08/25  1503 05/08/25  1606 05/08/25  1831   ISTATAPH 7.406 7.398 7.388   ISTATAPCO2 32.9 35.8 39.8   ISTATAPO2 110* 105 102   ISTATATCO2 22* 23* 25*   EYHERCD5QBZ 98 98 98   ISTATARTHCO3 20.7* 22.0 24.0   ISTATARTBE -3 -2 -1   ISTATTEMP 36.3 C 36.8 C 37.3 C   ISTATFIO2 30 30 30   ISTATSPEC Arterial Arterial Arterial   ISTATAPHTC 7.416 7.401 7.384   HHZQXBDF8NH 105 104 104         Recent Labs     05/08/25  0542  05/08/25  1249 05/08/25  1700 05/08/25  2300 05/09/25  0200 05/09/25  0500   SODIUM 138  --   --   --  137  --    POTASSIUM 4.3 4.6   < > 4.2 4.6 4.2   CHLORIDE 104  --   --   --  105  --    CO2 23  --   --   --  24  --    BUN 16  --   --   --  20  --    CREATININE 0.99  --   --   --  0.98  --    MAGNESIUM  --  2.7*  --   --   --   --    CALCIUM 9.7  --   --   --  7.7*  --     < > = values in this interval not displayed.     Recent Labs     05/08/25  0542 05/09/25  0200   GLUCOSE 114* 200*     Recent Labs     05/09/25  0200   WBC 13.4*     Recent Labs     05/08/25  1249 05/09/25  0200   RBC  --  4.07*   HEMOGLOBIN 13.9* 12.7*   HEMATOCRIT 40.9* 37.2*   PLATELETCT 146* 145*   PROTHROMBTM 18.4*  --    APTT 53.2*  --    INR 1.52*  --        Imaging  X-Ray:  I have personally reviewed the images and compared with prior images.  EKG:  I have personally reviewed the images and compared with prior images.  Echo:   Reviewed    Assessment/Plan  * CAD in native artery- (present on admission)  Assessment & Plan  Patient is post cardiac surgery performed by: Dr Elayne Valdovinos on 5/8 date.   Procedure: Coronary artery bypass grafting x4  Left internal mammary artery to left anterior descending artery  Reversed saphenous vein graft to ramus artery  Reversed saphenous vein graft to obtuse marginal artery #1  Reversed saphenous vein graft to posterior descending artery  Endo-vein procurement  Left Atrial appendage ligation with 40mm Atriclip  Will monitor hemodynamics and titrate pressor and inotropic support.   Will follow chest tube output and correct coagulopathy.   Will monitor for post cardiac surgery complication such as myocardial dysfunction, bleeding, tamponade, graft dysfunction, arrhythmia, respiratory failure, neurologic, renal and infectious complication.    Will also monitor and treat stress hyperglycemia.       Asthma  Assessment & Plan  History of asthma.  As needed bronchodilators.    Obstructive sleep  apnea  Assessment & Plan  Known MIRELLA.  Monitor oxygen saturations at night.  May need home oxygen on discharge  Consider follow-up as outpatient with sleep medicine.    Encounter for weaning from ventilator (HCC)  Assessment & Plan  S/p extubation 5/8  IS pain control, mobilize  Force diuresis when able    Mixed hyperlipidemia- (present on admission)  Assessment & Plan  On Statin  Continue    Obesity- (present on admission)  Assessment & Plan  BMI 36  Counseling         VTE:  Lovenox  Ulcer: PPI  Lines: Central Line  Ongoing indication addressed, Roper Catheter  Ongoing indication addressed, and CT    I have performed a physical exam and reviewed and updated ROS and Plan today (5/9/2025). In review of yesterday's note (5/8/2025), there are no changes except as documented above.     Discussed patient condition and risk of morbidity and/or mortality with RN, RT, Pharmacy, Charge nurse / hot rounds, Patient, and CVS    The patient remains critically ill on insulin gtt post cardiac surgery.  Critical care time = 40 minutes in directly providing and coordinating critical care and extensive data review.  No time overlap and excludes procedures.   O-Z Flap Text: The defect edges were debeveled with a #15 scalpel blade.  Given the location of the defect, shape of the defect and the proximity to free margins an O-Z flap was deemed most appropriate.  Using a sterile surgical marker, an appropriate transposition flap was drawn incorporating the defect and placing the expected incisions within the relaxed skin tension lines where possible. The area thus outlined was incised deep to adipose tissue with a #15 scalpel blade.  The skin margins were undermined to an appropriate distance in all directions utilizing iris scissors.

## 2025-05-09 NOTE — DISCHARGE INSTR - DIET
Heart-Healthy Consistent Carbohydrate Nutrition Therapy    A heart-healthy and consistent carbohydrate diet is recommended to manage heart disease and diabetes.  To follow a heart-healthy and consistent carbohydrate diet,  Eat a balanced diet with whole grains, fruits and vegetables, and lean protein sources.  Choose heart-healthy unsaturated fats. Limit saturated fats, trans fats, and cholesterol intake. Eat more plant-based or vegetarian meals using beans and soy foods for protein.  Eat whole, unprocessed foods to limit the amount of sodium (salt) you eat.  Choose a consistent amount of carbohydrate at each meal and snack. Limit refined carbohydrates especially sugar, sweets and sugar-sweetened beverages.  If you drink alcohol, do so in moderation: one serving per day (women) and two servings per day (men).  o One serving is equivalent to 12 ounces beer, 5 ounces wine, or 1.5 ounces distilled spirits    Tips for Choosing Heart-Healthy Fats    Choose lean protein and low-fat dairy foods to reduce saturated fat intake.  Saturated fat is usually found in animal-based protein and is associated with certain health risks. Saturated fat is the biggest contributor to raise low-density lipoprotein (LDL) cholesterol levels. Research shows that limiting saturated fat lowers unhealthy cholesterol levels. Eat no more than 7% of your total calories each day from saturated fat. Ask your RDN to help you determine how much saturated fat is right for you.  There are many foods that do not contain large amounts of saturated fats. Swapping these foods to replace foods high in saturated fats will help you limit the saturated fat you eat and improve your cholesterol levels. You can also try eating more plant-based or vegetarian meals.    Instead of… Try:   Whole milk, cheese, yogurt, and ice cream 1% or skim milk, low-fat cheese, non-fat yogurt, and low-fat ice cream   Fatty, marbled beef and pork Lean beef, pork, or venison   Poultry  with skin Poultry without skin   Butter, stick margarine Reduced-fat, whipped, or liquid spreads   Coconut oil, palm oil Liquid vegetable oils: corn, canola, olive, soybean and safflower oils     Avoid foods that contain trans fats.  Trans fats increase levels of LDL-cholesterol. Hydrogenated fat in processed foods is the main source of trans fats in foods.   Trans fats can be found in stick margarine, shortening, processed sweets, baked goods, some fried foods, and packaged foods made with hydrogenated oils. Avoid foods with “partially hydrogenated oil” on the ingredient list such as: cookies, pastries, baked goods, biscuits, crackers, microwave popcorn, and frozen dinners.  Choose foods with heart healthy fats.  Polyunsaturated and monounsaturated fat are unsaturated fats that may help lower your blood cholesterol level when used in place of saturated fat in your diet.  Ask your RDN about taking a dietary supplement with plant sterols and stanols to help lower your cholesterol level.  Research shows that substituting saturated fats with unsaturated fats is beneficial to cholesterol levels. Try these easy swaps:     Instead of… Try:   Butter, stick margarine, or solid shortening Reduced-fat, whipped, or liquid spreads   Beef, pork, or poultry with skin    Fish and seafood   Chips, crackers, snack foods Raw or unsalted nuts and seeds or nut butters  Hummus with vegetables  Avocado on toast   Coconut oil, palm oil Liquid vegetable oils: corn, canola, olive, soybean and safflower oils      Limit the amount of cholesterol you eat to less than 200 milligrams per day.  Cholesterol is a substance carried through the bloodstream via lipoproteins, which are known as “transporters” of fat. Some body functions need cholesterol to work properly, but too much cholesterol in the bloodstream can damage arteries and build up blood vessel linings (which can lead to heart attack and stroke). You should eat less than 200 milligrams  cholesterol per day.  People respond differently to eating cholesterol. There is no test available right now that can figure out which people will respond more to dietary cholesterol and which will respond less. For individuals with high intake of dietary cholesterol, different types of increase (none, small, moderate, large) in LDL-cholesterol levels are all possible.    Food sources of cholesterol include egg yolks and organ meats such as liver, gizzards.  Limit egg yolks to two to four per week and avoid organ meats like liver and gizzards to control cholesterol intake.    Tips for Choosing Heart-Healthy Carbohydrates  Consume a consistent amount of carbohydrate  It is important to eat foods with carbohydrates in moderation because they impact your blood glucose level. Carbohydrates can be found in many foods such as:  Grains (breads, crackers, rice, pasta, and cereals)  Starchy Vegetables (potatoes, corn, and peas)  Beans and legumes  Milk, soy milk, and yogurt  Fruit and fruit juice  Sweets (cakes, cookies, ice cream, jam and jelly)  Your RDN will help you set a goal for how many carbohydrate servings to eat at your meals and snacks. For many adults, eating 3 to 5 servings of carbohydrate foods at each meal and 1 or 2 carbohydrate servings for each snack works well.  Check your blood glucose level regularly. It can tell you if you need to adjust when you eat carbohydrates.    Choose foods rich in viscous (soluble) fiber  Viscous, or soluble, is found in the walls of plant cells. Viscous fiber is found only in plant-based foods. Eating foods with fiber helps to lower your unhealthy cholesterol and keep your blood glucose in range  Rich sources of viscous fiber include vegetables (asparagus, [place] sprouts, sweet potatoes, turnips) fruit (apricots, mangoes, oranges), legumes, and whole grains (barley, oats, and oat bran).  As you increase your fiber intake gradually, also increase the amount of water you drink.  This will help prevent constipation.  If you have difficulty achieving this goal, ask your RDN about fiber laxatives. Choose fiber supplements made with viscous fibers such as psyllium seed husks or methylcellulose to help lower unhealthy cholesterol.     Limit refined carbohydrates  There are three types of carbohydrates: starches, sugar, and fiber. Some carbohydrates occur naturally in food, like the starches in rice or corn or the sugars in fruits and milk. Refined carbohydrates--foods with high amounts of simple sugars--can raise triglyceride levels. High triglyceride levels are associated with coronary heart disease.  Some examples of refined carbohydrate foods are table sugar, sweets, and beverages sweetened with added sugar.    Tips for Reducing Sodium (Salt)  Although sodium is important for your body to function, too much sodium can be harmful for people with high blood pressure. As sodium and fluid buildup in your tissues and bloodstream, your blood pressure increases. High blood pressure may cause damage to other organs and increase your risk for a stroke.  Even if you take a pill for blood pressure or a water pill (diuretic) to remove fluid, it is still important to have less salt in your diet. Ask your doctor and RDN what amount of sodium is right for you.  Avoid processed foods. Eat more fresh foods.  Fresh fruits and vegetables are naturally low in sodium, as well as frozen vegetables and fruits that have no added juices or sauces.  Fresh meats are lower in sodium than processed meats, such as temple, sausage, and hotdogs. Read the nutrition label or ask your  to help you find a fresh meat that is low in sodium.  Eat less salt--at the table and when cooking.  A single teaspoon of table salt has 2,300 mg of sodium.  Leave the salt out of recipes for pasta, casseroles, and soups.  Ask your RDN how to cook your favorite recipes without sodium  Be a smart .  Look for food packages that say  “salt-free” or “sodium-free.” These items contain less than 5 milligrams of sodium per serving.  “Very low-sodium” products contain less than 35 milligrams of sodium per serving.  “Low-sodium” products contain less than 140 milligrams of sodium per serving.  Beware for “Unsalted” or “No Added Salt” products. These items may still be high in sodium. Check the nutrition label.  Add flavors to your food without adding sodium.  Try lemon juice, lime juice, fruit juice or vinegar.  Dry or fresh herbs add flavor. Try basil, bay leaf, dill, rosemary, parsley, tyler, dry mustard, nutmeg, thyme, and paprika.  Pepper, red pepper flakes, and cayenne pepper can add spice t your meals without adding sodium. Hot sauce contains sodium, but if you use just a drop or two, it will not add up to much.  Buy a sodium-free seasoning blend or make your own at home.    Additional Lifestyle Tips  Achieve and maintain a healthy weight  Talk with your RDN or your doctor about what is a healthy weight for you.  Set goals to reach and maintain that weight.   To lose weight, reduce your calorie intake along with increasing your physical activity. A weight loss of 10 to 15 pounds could reduce LDL-cholesterol by 5 milligrams per deciliter.    Participate in physical activity  Talk with your health care team to find out what types of physical activity are best for you. Set a plan to get about 30 minutes of exercise on most days.    Food Group Foods Recommended   Grains Whole grain breads and cereals, including whole wheat, barley, rye, buckwheat, corn, teff, quinoa, millet, amaranth, brown or wild rice, sorghum, and oats  Pasta, especially whole wheat or other whole grain types  Brown rice, quinoa or wild rice  Whole grain crackers, bread, rolls, pitas  Home-made bread with reduced-sodium baking soda   Protein Foods Lean cuts of beef and pork (loin, leg, round, extra lean hamburger)  Skinless poultry  Fish  Venison and other wild game  Dried beans  and peas  Nuts and nut butters  Meat alternatives made with soy or textured vegetable protein  Egg whites or egg substitute  Cold cuts made with lean meat or soy protein   Dairy Nonfat (skim), low-fat, or 1%-fat milk  Nonfat or low-fat yogurt or cottage cheese  Fat-free and low-fat cheese   Vegetables Fresh, frozen, or canned vegetables without added fat or salt   Fruits Fresh, frozen, canned, or dried fruit   Oils Unsaturated oils (corn, olive, peanut, soy, sunflower, canola)  Soft or liquid margarines and vegetable oil spreads  Salad dressings  Seeds and nuts  Avocado     Foods Not Recommended  Food Group Foods Not Recommended   Grains Breads or crackers topped with salt  Cereals (hot or cold) with more than 300 mg sodium per serving  Biscuits, cornbread, and other “quick” breads prepared with baking soda  Bread crumbs or stuffing mix from a store  High-fat bakery products, such as doughnuts, biscuits, croissants, Hebrew pastries, pies, cookies  Instant cooking foods to which you add hot water and stir--potatoes, noodles, rice, etc.  Packaged starchy foods--seasoned noodle or rice dishes, stuffing mix, macaroni and cheese dinner  Snacks made with partially hydrogenated oils, including chips, cheese puffs, snack mixes, regular crackers, butter-flavored popcorn   Protein Foods Higher-fat cuts of meats (ribs, t-bone steak, regular hamburger)  Noe, sausage, or hot dogs  Cold cuts, such as salami or bologna, deli meats, cured meats, corned beef  Organ meats (liver, brains, gizzards, sweetbreads)  Poultry with skin  Fried or smoked meat, poultry, and fish  Whole eggs and egg yolks (more than 2-4 per week)  Salted legumes, nuts, seeds, or nut/seed butters  Meat alternatives with high levels of sodium (>300 mg per serving) or saturated fat (>5 g per serving)   Dairy Whole milk, 2% fat milk, buttermilk  Whole milk yogurt or ice cream  Cream  Half-&-half  Cream cheese  Sour cream  Cheese   Vegetables Canned or frozen  vegetables with salt, fresh vegetables prepared with salt, butter, cheese, or cream sauce  Fried vegetables  Pickled vegetables such as olives, pickles, or sauerkraut   Fruits Fried fruits  Fruits served with butter or cream   Oils Butter, stick margarine, shortening  Partially hydrogenated oils or trans fats  Tropical oils (coconut, palm, palm kernel oils)   Other Candy, sugar sweetened soft drinks and desserts  Salt, sea salt, garlic salt, and seasoning mixes containing salt  Bouillon cubes  Ketchup, barbecue sauce, Worcestershire sauce, soy sauce, teriyaki sauce  Miso  Salsa  Pickles, olives, relish     Heart Healthy Consistent Carbohydrate Sample 1-Day Menu   Breakfast 1 cup cooked oatmeal (2 carbohydrate servings)  3/4 cup blueberries (1 carbohydrate serving)  1 ounce almonds  1 cup skim milk (1 carbohydrate serving)  1 cup coffee   Morning Snack 1 cup sugar-free nonfat yogurt (1 carbohydrate serving)   Lunch 2 slices whole-wheat bread (2 carbohydrate servings)  2 ounces lean turkey breast  1 ounce low-fat Swiss cheese  1 teaspoon mustard  1 slice tomato  1 lettuce leaf  1 small pear (1 carbohydrate serving)  1 cup skim milk (1 carbohydrate serving)   Afternoon Snack 1 ounce trail mix with unsalted nuts, seeds, and raisins (1 carbohydrate serving)   Evening Meal 3 ounces salmon  2/3 cup cooked brown rice (2 carbohydrate servings)  1 teaspoon soft margarine  1 cup cooked broccoli with 1/2 cup cooked carrots (1 carbohydrate serving  Carrots, cooked, boiled, drained, without salt  1 cup lettuce  1 teaspoon olive oil with vinegar for dressing  1 small whole grain roll (1 carbohydrate serving)  1 teaspoon soft margarine  1 cup unsweetened tea   Evening Snack 1 extra-small banana (1 carbohydrate serving)         Nutrition Counseling  Our expert team offers:   Medical Nutrition Therapy for Chronic Conditions   Weight Management   Diabetes Education and Management   Wellness Services   Body Composition Measurements    Gastrointestinal Health    [facility] are located at:  [address], [place] [address]  For more information and to schedule a consultation, please call [phone].  A physician referral may be required by your insurance for coverage.

## 2025-05-09 NOTE — PROGRESS NOTES
Cardiovascular Surgery Progress Note    Name: Obinna Bryan  MRN: [id number]  : 1980  Admit Date: 2025  5:22 AM  1 Day Post-Op     Procedure:  Procedure(s) and Anesthesia Type:     * CABG, WITH ENDOSCOPIC VEIN PROCUREMENT  X4 - General     * CLIPPING, LEFT ATRIAL APPENDAGE - General     * ECHOCARDIOGRAM, TRANSESOPHAGEAL, INTRAOPERATIVE - General    Vitals:  Vitals:    25 0545 25 0600 25 0615 25 0630   BP: 105/56 102/57 99/57 102/59   Pulse: 73 68 67 67   Resp: 12 (!) 10 (!) 11 (!) 34   Temp:  37 °C (98.6 °F)     TempSrc:  Bladder     SpO2: 97% 95% 94% 96%   Weight:   122 kg (268 lb 8.3 oz)    Height:          Temp (24hrs), Av.7 °C (98 °F), Min:35.4 °C (95.7 °F), Max:37.1 °C (98.8 °F)      Respiratory:  Vent Mode: Spont, PEEP/CPAP: 8, PIP: 15, MAP: 10 Respiration: (!) 34, Pulse Oximetry: 96 %       Fluids:    Intake/Output Summary (Last 24 hours) at 2025 0725  Last data filed at 2025 0600  Gross per 24 hour   Intake 84947.19 ml   Output 5381 ml   Net 7798.19 ml     Admit weight: Weight: 119 kg (261 lb 7.5 oz)  Current weight: Weight: 122 kg (268 lb 8.3 oz) (25 0615)    Labs:  Recent Labs     25  1249 25  0200   WBC  --  13.4*   RBC  --  4.07*   HEMOGLOBIN 13.9* 12.7*   HEMATOCRIT 40.9* 37.2*   MCV  --  91.4   MCH  --  31.2   MCHC  --  34.1   RDW  --  42.3   PLATELETCT 146* 145*   MPV  --  8.8*     Recent Labs     25  0542 25  1249 25  2300 25  0200 25  0500   SODIUM 138  --   --  137  --    POTASSIUM 4.3   < > 4.2 4.6 4.2   CHLORIDE 104  --   --  105  --    CO2 23  --   --  24  --    GLUCOSE 114*  --   --  200*  --    BUN 16  --   --  20  --    CREATININE 0.99  --   --  0.98  --    CALCIUM 9.7  --   --  7.7*  --     < > = values in this interval not displayed.     Recent Labs     25  1249   APTT 53.2*   INR 1.52*       HgbA1c:  5.9    Diabetic Educator Consult:  N/A    Medications:  Scheduled Medications    Medication Dose Frequency    potassium chloride  10 mEq Once    insulin lispro  2-9 Units 4X/DAY ACHS    enoxaparin (LOVENOX) injection  40 mg DAILY AT 1800    Nozin nasal  swab  1 Applicator BID    magnesium sulfate  1 g DAILY    K+ Scale: Goal of 4.5  1 Each Q6HRS    metoprolol tartrate  12.5 mg BID    Followed by    [START ON 5/10/2025] metoprolol tartrate  25 mg BID    aspirin  81 mg DAILY    clopidogrel  75 mg DAILY    acetaminophen  1,000 mg Q6HRS    senna-docusate  2 Tablet BID    And    polyethylene glycol/lytes  1 Packet DAILY    And    [START ON 5/10/2025] magnesium hydroxide  30 mL DAILY    omeprazole  20 mg DAILY    rosuvastatin  20 mg QHS    insulin lispro  0-14 Units TID AC        Exam:   Physical Exam  Vitals and nursing note reviewed.   Constitutional:       General: He is not in acute distress.     Appearance: Normal appearance.   HENT:      Head: Normocephalic.      Right Ear: External ear normal.      Left Ear: External ear normal.      Nose: Nose normal. No congestion.      Mouth/Throat:      Mouth: Mucous membranes are moist.      Pharynx: Oropharynx is clear.   Eyes:      Extraocular Movements: Extraocular movements intact.      Pupils: Pupils are equal, round, and reactive to light.   Cardiovascular:      Rate and Rhythm: Normal rate and regular rhythm.      Pulses: Normal pulses.      Heart sounds: Normal heart sounds.   Pulmonary:      Effort: Pulmonary effort is normal.      Breath sounds: Decreased breath sounds present.   Abdominal:      General: Bowel sounds are decreased. There is no distension.      Palpations: Abdomen is soft.   Musculoskeletal:      Cervical back: Normal range of motion and neck supple. No tenderness.      Right lower leg: Edema present.      Left lower leg: Edema present.   Skin:     General: Skin is warm and dry.      Comments: Midline surgical incision   Neurological:      General: No focal deficit present.      Mental Status: He is alert and oriented  to person, place, and time. Mental status is at baseline.   Psychiatric:         Mood and Affect: Mood normal.         Behavior: Behavior normal.         Thought Content: Thought content normal.         Cardiac Medications:    ASA - Yes    Plavix - Yes      Post-operative Beta Blockers - Yes    Ace/ARB- No; contraindicated because of Normal EF    ARNI-  No; contraindicated because of Normal EF    Statin - Yes    Aldactone- No; contraindicated because of Normal EF    SGLT2i-  No; contraindicated because of Normal EF    Ejection Fraction:  55%    Telemetry:   5/9 SR    Assessment/Plan:  POD 1  HDS, SR, neuro intact, wounds intact- prevena, abdomen soft, fluid balance positive, wt up, 3 L NC, moderate chest tube output.  Plan:  Keep chest tubes and pacing wires. Diurese when able. IS/ambulate.     Disposition:  D

## 2025-05-09 NOTE — PROGRESS NOTES
ADELAIDE Hughes notified of possible delay in 6 hour extubation time. Pt more awake, placed on spont, tolerating well. Pt received full 2L plasmalyte, vasoplegic at times with stimulation. Discussed with GEORGIA Hughes, plan to keep titrating norepi and add vaso if norepi increased to 0.14. Orders followed.

## 2025-05-09 NOTE — FLOWSHEET NOTE
05/08/25 1837   Weaning Parameters   RR (bpm) 24   $ FVC / Vital Capacity (liters)  2.3   NIF (cm H2O)  -30   Rapid Shallow Breathing Index (RR/VT) 25   Spontaneous VE 10.5   Spontaneous      Pt pulled adequate parameters for extubation. Pt extubated to 4L NC w/o complications. No distress noted

## 2025-05-10 LAB
ANION GAP SERPL CALC-SCNC: 6 MMOL/L (ref 7–16)
BUN SERPL-MCNC: 18 MG/DL (ref 8–22)
CALCIUM SERPL-MCNC: 8.3 MG/DL (ref 8.5–10.5)
CHLORIDE SERPL-SCNC: 103 MMOL/L (ref 96–112)
CO2 SERPL-SCNC: 27 MMOL/L (ref 20–33)
CREAT SERPL-MCNC: 0.8 MG/DL (ref 0.5–1.4)
ERYTHROCYTE [DISTWIDTH] IN BLOOD BY AUTOMATED COUNT: 43.8 FL (ref 35.9–50)
GFR SERPLBLD CREATININE-BSD FMLA CKD-EPI: 110 ML/MIN/1.73 M 2
GLUCOSE SERPL-MCNC: 138 MG/DL (ref 65–99)
HCT VFR BLD AUTO: 33.9 % (ref 42–52)
HGB BLD-MCNC: 11.5 G/DL (ref 14–18)
MCH RBC QN AUTO: 31.3 PG (ref 27–33)
MCHC RBC AUTO-ENTMCNC: 33.9 G/DL (ref 32.3–36.5)
MCV RBC AUTO: 92.4 FL (ref 81.4–97.8)
PLATELET # BLD AUTO: 137 K/UL (ref 164–446)
PMV BLD AUTO: 9.3 FL (ref 9–12.9)
POTASSIUM SERPL-SCNC: 4.5 MMOL/L (ref 3.6–5.5)
RBC # BLD AUTO: 3.67 M/UL (ref 4.7–6.1)
SODIUM SERPL-SCNC: 136 MMOL/L (ref 135–145)
WBC # BLD AUTO: 16.1 K/UL (ref 4.8–10.8)

## 2025-05-10 PROCEDURE — 700102 HCHG RX REV CODE 250 W/ 637 OVERRIDE(OP)

## 2025-05-10 PROCEDURE — 700111 HCHG RX REV CODE 636 W/ 250 OVERRIDE (IP): Mod: JZ

## 2025-05-10 PROCEDURE — 700111 HCHG RX REV CODE 636 W/ 250 OVERRIDE (IP): Mod: JZ | Performed by: NURSE PRACTITIONER

## 2025-05-10 PROCEDURE — A9270 NON-COVERED ITEM OR SERVICE: HCPCS | Performed by: NURSE PRACTITIONER

## 2025-05-10 PROCEDURE — 85027 COMPLETE CBC AUTOMATED: CPT

## 2025-05-10 PROCEDURE — 700102 HCHG RX REV CODE 250 W/ 637 OVERRIDE(OP): Performed by: NURSE PRACTITIONER

## 2025-05-10 PROCEDURE — 770020 HCHG ROOM/CARE - TELE (206)

## 2025-05-10 PROCEDURE — A9270 NON-COVERED ITEM OR SERVICE: HCPCS

## 2025-05-10 PROCEDURE — 80048 BASIC METABOLIC PNL TOTAL CA: CPT

## 2025-05-10 RX ORDER — FUROSEMIDE 10 MG/ML
20 INJECTION INTRAMUSCULAR; INTRAVENOUS 2 TIMES DAILY
Status: DISCONTINUED | OUTPATIENT
Start: 2025-05-10 | End: 2025-05-11

## 2025-05-10 RX ORDER — POTASSIUM CHLORIDE 1500 MG/1
20 TABLET, EXTENDED RELEASE ORAL DAILY
Status: DISCONTINUED | OUTPATIENT
Start: 2025-05-10 | End: 2025-05-11

## 2025-05-10 RX ADMIN — MAGNESIUM HYDROXIDE 30 ML: 2400 SUSPENSION ORAL at 05:40

## 2025-05-10 RX ADMIN — OXYCODONE 5 MG: 5 TABLET ORAL at 18:26

## 2025-05-10 RX ADMIN — SENNOSIDES AND DOCUSATE SODIUM 2 TABLET: 50; 8.6 TABLET ORAL at 17:32

## 2025-05-10 RX ADMIN — ROSUVASTATIN CALCIUM 20 MG: 20 TABLET, FILM COATED ORAL at 21:00

## 2025-05-10 RX ADMIN — MAGNESIUM SULFATE IN DEXTROSE 1 G: 10 INJECTION, SOLUTION INTRAVENOUS at 05:45

## 2025-05-10 RX ADMIN — FUROSEMIDE 20 MG: 10 INJECTION, SOLUTION INTRAVENOUS at 17:31

## 2025-05-10 RX ADMIN — FUROSEMIDE 20 MG: 10 INJECTION, SOLUTION INTRAVENOUS at 08:45

## 2025-05-10 RX ADMIN — Medication 1 APPLICATOR: at 08:45

## 2025-05-10 RX ADMIN — ACETAMINOPHEN 1000 MG: 500 TABLET ORAL at 05:40

## 2025-05-10 RX ADMIN — ENOXAPARIN SODIUM 40 MG: 100 INJECTION SUBCUTANEOUS at 17:31

## 2025-05-10 RX ADMIN — ACETAMINOPHEN 1000 MG: 500 TABLET ORAL at 13:17

## 2025-05-10 RX ADMIN — OXYCODONE HYDROCHLORIDE 10 MG: 10 TABLET ORAL at 04:11

## 2025-05-10 RX ADMIN — ASPIRIN 81 MG: 81 TABLET, COATED ORAL at 05:40

## 2025-05-10 RX ADMIN — POTASSIUM CHLORIDE 20 MEQ: 1500 TABLET, EXTENDED RELEASE ORAL at 08:45

## 2025-05-10 RX ADMIN — OMEPRAZOLE 20 MG: 20 CAPSULE, DELAYED RELEASE ORAL at 05:40

## 2025-05-10 RX ADMIN — Medication 1 APPLICATOR: at 21:00

## 2025-05-10 RX ADMIN — METOPROLOL TARTRATE 25 MG: 25 TABLET, FILM COATED ORAL at 05:40

## 2025-05-10 RX ADMIN — CLOPIDOGREL BISULFATE 75 MG: 75 TABLET, FILM COATED ORAL at 05:40

## 2025-05-10 RX ADMIN — OXYCODONE HYDROCHLORIDE 10 MG: 10 TABLET ORAL at 08:59

## 2025-05-10 RX ADMIN — SENNOSIDES AND DOCUSATE SODIUM 2 TABLET: 50; 8.6 TABLET ORAL at 05:40

## 2025-05-10 RX ADMIN — POLYETHYLENE GLYCOL 3350 1 PACKET: 17 POWDER, FOR SOLUTION ORAL at 05:40

## 2025-05-10 RX ADMIN — ACETAMINOPHEN 1000 MG: 500 TABLET ORAL at 17:30

## 2025-05-10 RX ADMIN — METOPROLOL TARTRATE 25 MG: 25 TABLET, FILM COATED ORAL at 17:32

## 2025-05-10 RX ADMIN — OXYCODONE HYDROCHLORIDE 10 MG: 10 TABLET ORAL at 22:35

## 2025-05-10 ASSESSMENT — PAIN DESCRIPTION - PAIN TYPE
TYPE: ACUTE PAIN;SURGICAL PAIN
TYPE: ACUTE PAIN
TYPE: ACUTE PAIN
TYPE: SURGICAL PAIN
TYPE: ACUTE PAIN;SURGICAL PAIN
TYPE: ACUTE PAIN
TYPE: ACUTE PAIN;SURGICAL PAIN
TYPE: ACUTE PAIN
TYPE: ACUTE PAIN
TYPE: ACUTE PAIN;SURGICAL PAIN
TYPE: ACUTE PAIN
TYPE: ACUTE PAIN;SURGICAL PAIN
TYPE: ACUTE PAIN

## 2025-05-10 ASSESSMENT — FIBROSIS 4 INDEX: FIB4 SCORE: 1.86

## 2025-05-10 NOTE — PROGRESS NOTES
4 Eyes Skin Assessment Completed by KIRK Boyce and KIRK Chou.    Head WDL  Ears WDL  Nose WDL  Mouth WDL  Neck WDL  Breast/Chest Incision Prevena in place over sternum incision   Shoulder Blades WDL  Spine WDL  (R) Arm/Elbow/Hand Bruising  (L) Arm/Elbow/Hand Bruising  Abdomen Redness and Incision mediastinal tube sites with gauze and Tegaderm  Groin Bruising  Scrotum/Coccyx/Buttocks Redness and Blanching  (R) Leg Redness, Bruising, Edema, and Incision Right EVH site with dermabond and open to air    (L) Leg Bruising  (R) Heel/Foot/Toe WDL  (L) Heel/Foot/Toe WDL          Devices In Places Tele Box, Blood Pressure Cuff, and Pulse Ox      Interventions In Place Pillows and Pressure Redistribution Mattress    Possible Skin Injury No    Pictures Uploaded Into Epic No, needs to be completed  Wound Consult Placed N/A  RN Wound Prevention Protocol Ordered No

## 2025-05-10 NOTE — CARE PLAN
The patient is Stable - Low risk of patient condition declining or worsening    Shift Goals  Clinical Goals: Wean off vasoactive medications, pain control, mobilize.  Patient Goals: Pain control, sleep.  Family Goals: Visit at bedside.    Progress made toward(s) clinical / shift goals:    Patient off all drips. Off insulin and transitioned to sliding scale. In chair dfor all meals. Ambulated X2  Problem: Knowledge Deficit - Standard  Goal: Patient and family/care givers will demonstrate understanding of plan of care, disease process/condition, diagnostic tests and medications  Outcome: Progressing     Problem: Communication  Goal: The ability to communicate needs accurately and effectively will improve  Outcome: Progressing     Problem: Hemodynamics  Goal: Patient's hemodynamics, fluid balance and neurologic status will be stable or improve  Outcome: Progressing     Problem: Pain - Standard  Goal: Alleviation of pain or a reduction in pain to the patient’s comfort goal  Outcome: Progressing       Patient is not progressing towards the following goals:

## 2025-05-10 NOTE — PROGRESS NOTES
Removed domínguez, pt provided urinal. Pt educated if he has trouble urinating or pressure in abdomen to notify RN, if patient does not urinate within 6 hours 1700 bladder scan per protocol.

## 2025-05-10 NOTE — PROGRESS NOTES
Patient doing well post cardiac surgery ambulating units low levels oxygen 1l n/c. Labs and vitals stable. CCM will sign off.     Edison Gage MD  Critical Care Medicine

## 2025-05-10 NOTE — PROGRESS NOTES
Cardiovascular Surgery Progress Note    Name: Obinna Bryan  MRN: [id number]  : 1980  Admit Date: 2025  5:22 AM  2 Days Post-Op     Procedure:  Procedure(s) and Anesthesia Type:  Dr Valdovinos; 2025     * CABG, WITH ENDOSCOPIC VEIN PROCUREMENT  X4 - General     * CLIPPING, LEFT ATRIAL APPENDAGE - General     * ECHOCARDIOGRAM, TRANSESOPHAGEAL, INTRAOPERATIVE - General    Vitals:  Vitals:    05/10/25 0300 05/10/25 0400 05/10/25 0500 05/10/25 0600   BP: 111/57 124/60 121/63 108/71   Pulse: 70 68 78 75   Resp:       Temp:       TempSrc:       SpO2: 94% 94% 93% 92%   Weight:    121 kg (265 lb 14 oz)   Height:          Temp (24hrs), Av.1 °C (96.9 °F), Min:33.9 °C (93 °F), Max:36.9 °C (98.4 °F)      Respiratory:    Respiration: 20, Pulse Oximetry: 92 %       Fluids:    Intake/Output Summary (Last 24 hours) at 5/10/2025 0711  Last data filed at 5/10/2025 0700  Gross per 24 hour   Intake 2463.85 ml   Output 4810 ml   Net -2346.15 ml     Admit weight: Weight: 119 kg (261 lb 7.5 oz)  Current weight: Weight: 121 kg (265 lb 14 oz) (05/10/25 0600)    Labs:  Recent Labs     25  1249 25  0200 05/10/25  0204   WBC  --  13.4* 16.1*   RBC  --  4.07* 3.67*   HEMOGLOBIN 13.9* 12.7* 11.5*   HEMATOCRIT 40.9* 37.2* 33.9*   MCV  --  91.4 92.4   MCH  --  31.2 31.3   MCHC  --  34.1 33.9   RDW  --  42.3 43.8   PLATELETCT 146* 145* 137*   MPV  --  8.8* 9.3     Recent Labs     25  0542 25  1249 25  0200 25  0500 05/10/25  0204   SODIUM 138  --  137  --  136   POTASSIUM 4.3   < > 4.6 4.2 4.5   CHLORIDE 104  --  105  --  103   CO2 23  --  24  --  27   GLUCOSE 114*  --  200*  --  138*   BUN 16  --  20  --  18   CREATININE 0.99  --  0.98  --  0.80   CALCIUM 9.7  --  7.7*  --  8.3*    < > = values in this interval not displayed.     Recent Labs     25  1249   APTT 53.2*   INR 1.52*       HgbA1c:  5.9    Diabetic Educator Consult:  N/A    Medications:  Scheduled Medications   Medication Dose  Frequency    insulin lispro  2-9 Units 4X/DAY ACHS    enoxaparin (LOVENOX) injection  40 mg DAILY AT 1800    Nozin nasal  swab  1 Applicator BID    metoprolol tartrate  25 mg BID    aspirin  81 mg DAILY    clopidogrel  75 mg DAILY    acetaminophen  1,000 mg Q6HRS    senna-docusate  2 Tablet BID    And    polyethylene glycol/lytes  1 Packet DAILY    And    magnesium hydroxide  30 mL DAILY    omeprazole  20 mg DAILY    rosuvastatin  20 mg QHS        Exam:   Physical Exam  Vitals and nursing note reviewed.   Constitutional:       General: He is not in acute distress.     Appearance: Normal appearance.   HENT:      Head: Normocephalic.      Right Ear: External ear normal.      Left Ear: External ear normal.      Nose: Nose normal. No congestion.      Mouth/Throat:      Mouth: Mucous membranes are moist.      Pharynx: Oropharynx is clear.   Eyes:      Extraocular Movements: Extraocular movements intact.      Pupils: Pupils are equal, round, and reactive to light.   Cardiovascular:      Rate and Rhythm: Normal rate and regular rhythm.      Pulses: Normal pulses.      Heart sounds: Normal heart sounds.   Pulmonary:      Effort: Pulmonary effort is normal.   Abdominal:      General: There is no distension.      Palpations: Abdomen is soft.   Musculoskeletal:      Cervical back: Normal range of motion and neck supple. No tenderness.      Right lower leg: Edema present.      Left lower leg: Edema present.   Skin:     General: Skin is warm and dry.      Comments: Sternum: open to air, CDI  SVG site: open to air, CDI   Neurological:      General: No focal deficit present.      Mental Status: He is alert and oriented to person, place, and time. Mental status is at baseline.   Psychiatric:         Mood and Affect: Mood normal.         Behavior: Behavior normal.         Thought Content: Thought content normal.         Cardiac Medications:    ASA - Yes    Plavix - Yes    Post-operative Beta Blockers - Yes    Ace/ARB- No;  contraindicated because of Normal EF    ARNI-  No; contraindicated because of Normal EF    Statin - Yes    Aldactone- No; contraindicated because of Normal EF    SGLT2i-  No; contraindicated because of Normal EF    Ejection Fraction:  55%    Telemetry:   5/9 SR  5/10 SR    Assessment/Plan:  POD 1  HDS, SR, neuro intact, wounds intact- prevena, abdomen soft, fluid balance positive, wt up, 3 L NC, moderate chest tube output.  Plan:  Keep chest tubes and pacing wires. Diurese when able. IS/ambulate.     POD 2 HDS, SR, room air/1L NC. Neuro intact, wounds CDI. Abd soft, NT, BM -. Fluid up, wt up, Cr: 0.80. Chest tubes with 240cc out overnight, H/h: 11.5/33.9. Plan: Remove pacer wires and domínguez. Remove chest tubes if output less than 200cc at noon. Diurese, wean O2. Transfer to tele. Therapies eval    Disposition:  TBD

## 2025-05-10 NOTE — PROGRESS NOTES
Report given to Carli BRADLEY, all questions answered. Pt transferred to new floor with all belongings and wife at beside.

## 2025-05-10 NOTE — CARE PLAN
Problem: Post Op Day 1 CABG/Heart Valve Replacement  Goal: Optimal care of the post op CABG/heart valve replacement Post Op Day 1  Intervention: EKG and CXR completed  Note: EKG and chest xray completed  Intervention: All valve patients: PT/INR daily  Note: Not a valve  Intervention: Antibiotics are discontinued within 24 hours of anesthesia end time unless indication documented for continuation beyond 24 hours  Note: No antibiotics ordered currently  Intervention: Daily weights in the morning  Note: Pt weighed this AM  Intervention: Up in chair for all meals  Note: Pt up to chair for all meals  Intervention: Ambulate in am if stable. First ambulation 25 feet. Repeat x 3 as tolerated  Note: Pt ambulated x2 and will be ambulated once more on night shift  Intervention: Discontinue domínguez catheter unless documented reason for continuation  Note: NA, per CT surgery leave in place  Intervention: Assess surgical dressing and check provider orders for potential removal  Note: Will be completed on night shift  Intervention: [facility] trained RN to remove chest tubes if ordered by provider  Note: NA, per CT surgery to remain in place  Intervention: IS q 1 hour while awake and record best IS volume  Note: 1500    Intervention: Knee high DANK hose, on during the day, off at night  Note: DANK hose in place  Intervention: Saline lock IV  Note: Pt saline locked  Intervention: Transfer to [facility] status, begin VS q 4 hours  Note: NA  Intervention: After 24th hour post-anesthesia end time, transition patient to Cardiac Surgery SQ Insulin Protocol  Note: Pt transitioned to sliding scale  Intervention: If patient is CABG or on home beta-blocker, start/resume beta-blocker on POD 1 or POD 2 or document contraindication  Note: Metoprolol given BID   The patient is Stable - Low risk of patient condition declining or worsening    Shift Goals  Clinical Goals: Wean off vasoactive medications, pain control, mobilize.  Patient Goals: Pain control,  sleep.  Family Goals: Visit at bedside.    Progress made toward(s) clinical / shift goals:  up to chair and walking, pain well controlled     Patient is not progressing towards the following goals:

## 2025-05-10 NOTE — PROGRESS NOTES
Phone report received from [facility] rn. Assumed care of pt. PT transferred to [id number]. Pt awake, laying in bed. A/Ox4, VSS. Pt is on RA. Pt oriented to unit and educated to call before getting out of bed. POC reviewed and white board updated. Tele box on. Call light in reach. Bed locked in lowest position with 2 upper bed rails up. Bed alarm on. Pt is a moderate fall risk with assistance of one person necessary.

## 2025-05-10 NOTE — CARE PLAN
The patient is Stable - Low risk of patient condition declining or worsening    Shift Goals  Clinical Goals: Rest, pain magement, ambulation  Patient Goals: Pain control, rest  Family Goals: Updates, safety, comfort    Progress made toward(s) clinical / shift goals:    Problem: Knowledge Deficit - Standard  Goal: Patient and family/care givers will demonstrate understanding of plan of care, disease process/condition, diagnostic tests and medications  Outcome: Progressing     Problem: Communication  Goal: The ability to communicate needs accurately and effectively will improve  Outcome: Progressing     Problem: Hemodynamics  Goal: Patient's hemodynamics, fluid balance and neurologic status will be stable or improve  Outcome: Progressing     Problem: Post op day 2 CABG/Heart Valve Replacement  Goal: Optimal care of the post op CABG/heart valve replacement post op day 2  Outcome: Progressing  Intervention: FSBS: when 2 consecutive BS < 130 after post op day 2, discontinue FSBS unless patient is insulin dependent diabetic  Note: Patient  overnight  Intervention: Daily weights in the morning  Note: Daily weight obtained in AM  Intervention: Up in chair for all meals  Note: Up in chair for dinner and breakfast  Intervention: Ambulate 4 times daily, increasing the distance each time  Note: Ambulated x2 overnight, max distance 150ft  Intervention: IS q 1 hour while awake and record best IS volume  Note: Best IS 1500  Intervention: Consider pacer wire removal by MD  Note: Pacer wires capped overnight  Intervention: Consider removal of domínguez and chest tube if not already done  Note: Chest tubes and domínguez in place overnight     Problem: Respiratory  Goal: Patient will achieve/maintain optimum respiratory ventilation and gas exchange  Outcome: Progressing     Problem: Nutrition - Advanced  Goal: Patient will display progressive weight gain toward goal have adequate food and fluid intake  Outcome: Progressing     Problem:  Skin Integrity  Goal: Skin integrity is maintained or improved  Outcome: Progressing     Problem: Fall Risk  Goal: Patient will remain free from falls  Outcome: Progressing     Problem: Pain - Standard  Goal: Alleviation of pain or a reduction in pain to the patient’s comfort goal  Outcome: Progressing       Patient is not progressing towards the following goals:

## 2025-05-10 NOTE — PROGRESS NOTES
4 Eyes Skin Assessment Completed by KIRK Lopez and KIRK Bishop.    Head WDL  Ears WDL  Nose WDL  Mouth WDL  Neck Incision Left subclavin in place  Breast/Chest Incision pervena in place over sternum incision   Shoulder Blades WDL  Spine WDL  (R) Arm/Elbow/Hand Bruising  (L) Arm/Elbow/Hand Bruising  Abdomen Redness and Incision mediastinal tube sites in place gauze and tape over   Groin WDL bruising   Scrotum/Coccyx/Buttocks WDL  (R) Leg Redness, Bruising, Edema, and Incision REVH site bruising incision dermabond and open to air    (L) Leg Bruising  (R) Heel/Foot/Toe WDL  (L) Heel/Foot/Toe WDL          Devices In Places Tele Box, Blood Pressure Cuff, and Pulse Ox      Interventions In Place Pillows, Low Air Loss Mattress, Barrier Cream, and Heels Loaded W/Pillows    Possible Skin Injury No    Pictures Uploaded Into Epic N/A  Wound Consult Placed N/A  RN Wound Prevention Protocol Ordered No

## 2025-05-10 NOTE — PROGRESS NOTES
Monitor summary:        Rhythm: Sinus rhythm  Rate: 60-80  Ectopy: (r)PVC  Measurements: .16/.09/.45        12hr chart check

## 2025-05-11 LAB
ANION GAP SERPL CALC-SCNC: 8 MMOL/L (ref 7–16)
BUN SERPL-MCNC: 15 MG/DL (ref 8–22)
CALCIUM SERPL-MCNC: 8.3 MG/DL (ref 8.5–10.5)
CHLORIDE SERPL-SCNC: 102 MMOL/L (ref 96–112)
CO2 SERPL-SCNC: 27 MMOL/L (ref 20–33)
CREAT SERPL-MCNC: 0.85 MG/DL (ref 0.5–1.4)
ERYTHROCYTE [DISTWIDTH] IN BLOOD BY AUTOMATED COUNT: 43 FL (ref 35.9–50)
GFR SERPLBLD CREATININE-BSD FMLA CKD-EPI: 108 ML/MIN/1.73 M 2
GLUCOSE SERPL-MCNC: 68 MG/DL (ref 65–99)
HCT VFR BLD AUTO: 37.4 % (ref 42–52)
HGB BLD-MCNC: 12.5 G/DL (ref 14–18)
MCH RBC QN AUTO: 30.7 PG (ref 27–33)
MCHC RBC AUTO-ENTMCNC: 33.4 G/DL (ref 32.3–36.5)
MCV RBC AUTO: 91.9 FL (ref 81.4–97.8)
PLATELET # BLD AUTO: 151 K/UL (ref 164–446)
PMV BLD AUTO: 9 FL (ref 9–12.9)
POTASSIUM SERPL-SCNC: 3.8 MMOL/L (ref 3.6–5.5)
RBC # BLD AUTO: 4.07 M/UL (ref 4.7–6.1)
SODIUM SERPL-SCNC: 137 MMOL/L (ref 135–145)
WBC # BLD AUTO: 13.5 K/UL (ref 4.8–10.8)

## 2025-05-11 PROCEDURE — 85027 COMPLETE CBC AUTOMATED: CPT

## 2025-05-11 PROCEDURE — A9270 NON-COVERED ITEM OR SERVICE: HCPCS

## 2025-05-11 PROCEDURE — 700111 HCHG RX REV CODE 636 W/ 250 OVERRIDE (IP): Mod: JZ

## 2025-05-11 PROCEDURE — 700111 HCHG RX REV CODE 636 W/ 250 OVERRIDE (IP): Mod: JZ | Performed by: NURSE PRACTITIONER

## 2025-05-11 PROCEDURE — 97535 SELF CARE MNGMENT TRAINING: CPT

## 2025-05-11 PROCEDURE — 700102 HCHG RX REV CODE 250 W/ 637 OVERRIDE(OP)

## 2025-05-11 PROCEDURE — 770020 HCHG ROOM/CARE - TELE (206)

## 2025-05-11 PROCEDURE — 97165 OT EVAL LOW COMPLEX 30 MIN: CPT

## 2025-05-11 PROCEDURE — 80048 BASIC METABOLIC PNL TOTAL CA: CPT

## 2025-05-11 PROCEDURE — 700102 HCHG RX REV CODE 250 W/ 637 OVERRIDE(OP): Performed by: NURSE PRACTITIONER

## 2025-05-11 PROCEDURE — A9270 NON-COVERED ITEM OR SERVICE: HCPCS | Performed by: NURSE PRACTITIONER

## 2025-05-11 PROCEDURE — 97163 PT EVAL HIGH COMPLEX 45 MIN: CPT

## 2025-05-11 RX ADMIN — OXYCODONE 5 MG: 5 TABLET ORAL at 21:24

## 2025-05-11 RX ADMIN — SENNOSIDES AND DOCUSATE SODIUM 2 TABLET: 50; 8.6 TABLET ORAL at 04:52

## 2025-05-11 RX ADMIN — OXYCODONE 5 MG: 5 TABLET ORAL at 17:43

## 2025-05-11 RX ADMIN — METOPROLOL TARTRATE 25 MG: 25 TABLET, FILM COATED ORAL at 17:41

## 2025-05-11 RX ADMIN — FUROSEMIDE 20 MG: 10 INJECTION, SOLUTION INTRAVENOUS at 04:52

## 2025-05-11 RX ADMIN — Medication 1 APPLICATOR: at 21:24

## 2025-05-11 RX ADMIN — OXYCODONE 5 MG: 5 TABLET ORAL at 13:02

## 2025-05-11 RX ADMIN — CLOPIDOGREL BISULFATE 75 MG: 75 TABLET, FILM COATED ORAL at 04:51

## 2025-05-11 RX ADMIN — OXYCODONE 5 MG: 5 TABLET ORAL at 04:52

## 2025-05-11 RX ADMIN — ACETAMINOPHEN 1000 MG: 500 TABLET ORAL at 13:05

## 2025-05-11 RX ADMIN — Medication 1 APPLICATOR: at 09:53

## 2025-05-11 RX ADMIN — ACETAMINOPHEN 1000 MG: 500 TABLET ORAL at 17:44

## 2025-05-11 RX ADMIN — POLYETHYLENE GLYCOL 3350 1 PACKET: 17 POWDER, FOR SOLUTION ORAL at 04:53

## 2025-05-11 RX ADMIN — ACETAMINOPHEN 1000 MG: 500 TABLET ORAL at 04:52

## 2025-05-11 RX ADMIN — METOPROLOL TARTRATE 25 MG: 25 TABLET, FILM COATED ORAL at 04:52

## 2025-05-11 RX ADMIN — ASPIRIN 81 MG: 81 TABLET, COATED ORAL at 04:52

## 2025-05-11 RX ADMIN — ENOXAPARIN SODIUM 40 MG: 100 INJECTION SUBCUTANEOUS at 17:44

## 2025-05-11 RX ADMIN — ACETAMINOPHEN 1000 MG: 500 TABLET ORAL at 00:01

## 2025-05-11 RX ADMIN — OXYCODONE 5 MG: 5 TABLET ORAL at 09:52

## 2025-05-11 RX ADMIN — OMEPRAZOLE 20 MG: 20 CAPSULE, DELAYED RELEASE ORAL at 04:53

## 2025-05-11 RX ADMIN — POTASSIUM CHLORIDE 20 MEQ: 1500 TABLET, EXTENDED RELEASE ORAL at 04:53

## 2025-05-11 RX ADMIN — SENNOSIDES AND DOCUSATE SODIUM 2 TABLET: 50; 8.6 TABLET ORAL at 17:44

## 2025-05-11 RX ADMIN — ROSUVASTATIN CALCIUM 20 MG: 20 TABLET, FILM COATED ORAL at 21:24

## 2025-05-11 ASSESSMENT — PAIN DESCRIPTION - PAIN TYPE
TYPE: ACUTE PAIN

## 2025-05-11 ASSESSMENT — COGNITIVE AND FUNCTIONAL STATUS - GENERAL
MOBILITY SCORE: 24
HELP NEEDED FOR BATHING: A LITTLE
DAILY ACTIVITIY SCORE: 20
TOILETING: A LITTLE
SUGGESTED CMS G CODE MODIFIER DAILY ACTIVITY: CJ
DRESSING REGULAR LOWER BODY CLOTHING: A LOT
SUGGESTED CMS G CODE MODIFIER MOBILITY: CH

## 2025-05-11 ASSESSMENT — ACTIVITIES OF DAILY LIVING (ADL): TOILETING: INDEPENDENT

## 2025-05-11 ASSESSMENT — GAIT ASSESSMENTS
DEVIATION: BRADYKINETIC;INCREASED BASE OF SUPPORT
DISTANCE (FEET): 500
GAIT LEVEL OF ASSIST: SUPERVISED

## 2025-05-11 ASSESSMENT — FIBROSIS 4 INDEX: FIB4 SCORE: 1.69

## 2025-05-11 NOTE — CARE PLAN
The patient is Stable - Low risk of patient condition declining or worsening    Shift Goals  Clinical Goals: moblize, IS use, pull chest tube  Patient Goals: pain control  Family Goals: Updates, safety, comfort    Progress made toward(s) clinical / shift goals:    Problem: Hemodynamics  Goal: Patient's hemodynamics, fluid balance and neurologic status will be stable or improve  Description: Target End Date:  Prior to discharge or change in level of careDocument on Assessment and I/O flowsheet templates1.  Monitor vital signs, pulse oximetry and cardiac monitor per provider order and/or policy2.  Maintain blood pressure per provider order3.  Hemodynamic monitoring per provider order4.  Manage IV fluids and IV infusions5.  Monitor intake and output6.  Daily weights per unit policy or provider order7.  Assess peripheral pulses and capillary refill8.  Assess color and body temperature9.  Position patient for maximum circulation/cardiac [id number]. Monitor for signs/symptoms of excessive ovnqrwvr42. Assess mental status, restlessness and changes in level of [id number]. Monitor temperature and report fever or hypothermia to provider immediately. Consideration of targeted temperature management.  Target End Date:  Prior to discharge or change in level of careDocument on Assessment and I/O flowsheet templates1.  Monitor vital signs, pulse oximetry and cardiac monitor per provider order and/or policy2.  Maintain blood pressure per provider order3.  Hemodynamic monitoring per provider order4.  Manage IV fluids and IV infusions5.  Monitor intake and output6.  Daily weights per unit policy or provider order7.  Assess peripheral pulses and capillary refill8.  Assess color and body temperature9.  Position patient for maximum circulation/cardiac [id number]. Monitor for signs/symptoms of excessive pdxynwyf31. Assess mental status, restlessness and changes in level of [id number]. Monitor temperature and report fever or  hypothermia to provider immediately. Consideration of targeted temperature management.       Problem: Post op day 2 CABG/Heart Valve Replacement  Goal: Optimal care of the post op CABG/heart valve replacement post op day 2  Outcome: Progressing  Intervention: Daily weights in the morning  Intervention: FSBS: when 2 consecutive BS < 130 after post op day 2, discontinue FSBS unless patient is insulin dependent diabetic  Note: FSBS discontinued  Intervention: Daily weights in the morning  Note: Completed   Intervention: Up in chair for all meals  Note: Completed   Intervention: Ambulate 4 times daily, increasing the distance each time  Note: Patient ambulated 2 timed in CIC, and walked 1000ft with this RN.   Intervention: Stand at sink and wash up with assistance. Clean incisions twice daily with soap and water.  Note: Incisions were cleaned during shower with soap and water.  Intervention: IS q 1 hour while awake and record best IS volume  Note: Best volume: 1500.  Intervention: Consider pacer wire removal by MD  Note: Pacer wire removed on 5/9  Intervention: Consider removal of domínguez and chest tube if not already done  Note: Domínguez and chest removed this AM.     Patient is not progressing towards the following goals:

## 2025-05-11 NOTE — PROGRESS NOTES
" EMERGENCY DEPARTMENT ENCOUNTER    CHIEF COMPLAINT  Chief Complaint: Flair of behcets  History gi2ven by: Pt  History limited by: Nothing  Room Number: 45/45  PMD: No Known Provider      HPI:  Pt is a 25 y.o. female, history of Behcet's disease, who presents complaining of a flair up that began one week ago.  Pt states that she is unable to speak due to pain and is writing her responses on a pad of paper.  She reports mouth swelling and ulcers, painful swallowing, and painful gentiles.  PT denies fever. Pt visits the ED about once per month due to flairs of Behcet's syndrome.  She has not seen a Rheumatologist.  Pt states that she has not been able able to get in to see a Rheumatologist (has been trying since January) and just found out today that she has passport.  She has not tried recently to make an appointment.        Duration:  One week  Onset: Gradual  Timing: Constant  Location: Mouth and genitals.   Radiation: None  Quality: \"Pain\"  Intensity/Severity: Moderate  Progression: Unchanged  Associated Symptoms: None  Aggravating Factors: None  Alleviating Factors: None  Previous Episodes: Pt visits the ED about once per month due to flairs of Behcet's syndrome.   Treatment before arrival: None    PAST MEDICAL HISTORY  Active Ambulatory Problems     Diagnosis Date Noted   • Anxiety 01/04/2016   • ADD (attention deficit disorder) 01/04/2016   • Behcet's syndrome 01/04/2016   • Autoimmune disease    • Depression      Resolved Ambulatory Problems     Diagnosis Date Noted   • No Resolved Ambulatory Problems     Past Medical History:   Diagnosis Date   • ADD (attention deficit disorder)    • Anxiety    • Autoimmune disease    • Behcet's disease    • Depression    • Eye exam, routine 2013   • Pap smear, as part of routine gynecological examination 01/2015       PAST SURGICAL HISTORY  Past Surgical History:   Procedure Laterality Date   • BREAST CYST EXCISION  2010   • DECUBITUS ULCER EXCISION     • TONSILLECTOMY  2004 " Monitor Summary  Rhythm: SR/ST  Rate:   Ectopy: none  Measurements: .17/.08/.38                   FAMILY HISTORY  Family History   Problem Relation Age of Onset   • Diabetes Mother        SOCIAL HISTORY  Social History     Social History   • Marital status: Single     Spouse name: N/A   • Number of children: N/A   • Years of education: N/A     Occupational History   • Not on file.     Social History Main Topics   • Smoking status: Former Smoker     Packs/day: 0.50     Years: 5.00     Types: Cigarettes   • Smokeless tobacco: Never Used   • Alcohol use Yes      Comment: occ   • Drug use: No   • Sexual activity: Defer     Other Topics Concern   • Not on file     Social History Narrative       ALLERGIES  Review of patient's allergies indicates no known allergies.    REVIEW OF SYSTEMS  Review of Systems   Constitutional: Negative for fever.   HENT: Negative for sore throat.         Mouth ulcers   Eyes: Negative.    Respiratory: Negative for cough and shortness of breath.    Cardiovascular: Negative for chest pain.   Gastrointestinal: Negative for abdominal pain, diarrhea and vomiting.   Genitourinary: Negative for dysuria.        Genital pain   Musculoskeletal: Negative for neck pain.   Skin: Negative for rash.   Allergic/Immunologic: Negative.    Neurological: Negative for weakness, numbness and headaches.   Hematological: Negative.    Psychiatric/Behavioral: Negative.    All other systems reviewed and are negative.      PHYSICAL EXAM  ED Triage Vitals   Temp Heart Rate Resp BP SpO2   03/08/18 1157 03/08/18 1157 03/08/18 1157 03/08/18 1157 03/08/18 1157   97 °F (36.1 °C) 78 15 95/54 97 %      Temp src Heart Rate Source Patient Position BP Location FiO2 (%)   -- -- -- -- --              Physical Exam   Constitutional: She is oriented to person, place, and time and well-developed, well-nourished, and in no distress. No distress.   Pt is hesitant to speak due to pain.  Communicated with writing.  There are no airway issues.   HENT:   Head: Normocephalic and atraumatic.   Lips are swollen and cracked.  There are  "multiple superficial laceration in oropharynx.  There is a white plaque over the toungue.  There is some submandibular lymphadenopaty.  Neck is normal.  Trach is midline.     Eyes: EOM are normal. Pupils are equal, round, and reactive to light.   Neck: Normal range of motion. Neck supple.   Cardiovascular: Normal rate, regular rhythm and normal heart sounds.    Pulmonary/Chest: Effort normal and breath sounds normal. No respiratory distress.   Abdominal: Soft. There is no tenderness. There is no rebound and no guarding.   Musculoskeletal: Normal range of motion. She exhibits edema (Trace edema lower bilateral extremities).   Neurological: She is alert and oriented to person, place, and time. She has normal sensation and normal strength.   Skin: Skin is warm and dry. No rash noted.   Psychiatric: Mood and affect normal.   Nursing note and vitals reviewed.        PROCEDURES  Procedures      PROGRESS AND CONSULTS  ED Course     Medications   sodium chloride 0.9 % flush 10 mL (not administered)   sodium chloride 0.9 % bolus 1,000 mL (1,000 mL Intravenous New Bag 3/8/18 1228)   methylPREDNISolone sodium succinate (SOLU-Medrol) injection 125 mg (125 mg Intravenous Given 3/8/18 1229)   lidocaine viscous (XYLOCAINE) 2 % mouth solution 5 mL (5 mL Mouth/Throat Given 3/8/18 1249)       1225  Will give a dose of IV steroids and discharge her with medrol and \"magic mouthwash\" which she says works better for her than carafate.  Stressed importance of setting up a Rheumatology appointment by calling [facility] weekly. I will give her the name of other rheumatologists that she can follow up with as well.      MEDICAL DECISION MAKING  Results were reviewed/discussed with the patient and they were also made aware of online access. Pt also made aware that some labs, such as cultures, will not be resulted during ER visit and follow up with PMD is necessary.     MDM  Number of Diagnoses or Management Options     Amount and/or Complexity of " Data Reviewed  Decide to obtain previous medical records or to obtain history from someone other than the patient: yes  Review and summarize past medical records: yes (Pt visits the ED about once per month due to flairs of Behcet's syndrome.  She has not seen a Rheumatologist recently.  )    Patient Progress  Patient progress: stable         DIAGNOSIS  Final diagnoses:   Behcet's syndrome involving oral mucosa       DISPOSITION  DISCHARGE    Patient discharged in stable condition.    Reviewed implications of results, diagnosis, meds, responsibility to follow up, warning signs and symptoms of possible worsening, potential complications and reasons to return to ER.    Patient/Family voiced understanding of above instructions.    Discussed plan for discharge, as there is no emergent indication for admission.  Pt/family is agreeable and understands need for follow up and repeat testing.  Pt is aware that discharge does not mean that nothing is wrong but it indicates no emergency is present that requires admission and they must continue care with follow-up as given below or physician of their choice.     FOLLOW-UP  [facility]  [address]  [place] [address]  [phone]        [facility]  [address]  [place] [address]  [phone]        [facility]  [address]  [place] [address]  [phone]        [place] [facility]  [address]  [place] [address] [phone]        [facility]  [address] [address]  [phone]             Medication List      Stop          ALPRAZolam 1 MG tablet   Commonly known as:  XANAX       doxycycline 100 MG capsule   Commonly known as:  MONODOX       HYDROcodone-acetaminophen 5-325 MG per tablet   Commonly known as:  NORCO       HYDROcodone-acetaminophen 7.5-325 MG per tablet   Commonly known as:  NORCO        metroNIDAZOLE 500 MG tablet   Commonly known as:  FLAGYL       NON FORMULARY       nystatin susp + lidocaine viscous oral suspension   Commonly known as:  MAGIC MOUTHWASH       oxyCODONE-acetaminophen 7.5-325 MG per tablet   Commonly known as:  PERCOCET       predniSONE 20 MG tablet   Commonly known as:  DELTASONE       sulfamethoxazole-trimethoprim 800-160 MG per tablet   Commonly known as:  BACTRIM DS,SEPTRA DS               Latest Documented Vital Signs:  As of 1:06 PM  BP- 95/54 HR- 78 Temp- 97 °F (36.1 °C) O2 sat- 97%    --  Documentation assistance provided by sarmad Luevano for Dr. Everett.  Information recorded by the scribe was done at my direction and has been verified and validated by me.     Claudia Luevano  03/08/18 4313       Josh Everett MD  03/08/18 9663

## 2025-05-11 NOTE — THERAPY
"Occupational Therapy   Initial Evaluation     Patient Name: Obinna Bryan  Age:  45 y.o., Sex:  male  Medical Record #: [id number]  Today's Date: 5/11/2025     Precautions  Precautions: Cardiac Precautions (See Comments), Sternal Precautions (See Comments)    Assessment  Patient is a very pleasant 45 y.o. male with a PMHx significant for hyperlipidemia, exercise induced asthma, fatty liver disease, CAD with unstable angina, HTN and obstructive sleep apnea. Presented to the hospital for elective OHS; now s/p CABGx4 . Pt works full-time as a  and was independent with ADLs/IADLs prior to admission. Wife is taking time off work to assist PRN.    Pt greeted and seen for OT eval and treatment; wife present and supportive. Pt completed functional mobility with no more than SBA, bathing with min A and LB dressing with max A. Began patient and family education on sternal precautions, appropriate AE/DME to utilize during ADL routine, energy conservation strategies, compensatory strategies during ADLs and functional txfs to maximize independence and safety, and time spent reviewing \"Recovering from Heart Surgery\" handout . AE/DME education included information on use of reacher and sock-aid and shower chair. Patient and family  verbalized understanding. All questions answered at this time. Currently limited by impaired AROM, strength, balance, activity tolerance, functional mobility, adherence to precautions and pain which are currently affecting patients ability to complete ADL/IADLs at baseline. Will continue to follow.    Plan    Occupational Therapy Initial Treatment Plan   Treatment Interventions: Self Care / Activities of Daily Living, Adaptive Equipment, Neuro Re-Education / Balance, Therapeutic Exercises, Therapeutic Activity, Family / Caregiver Training  Treatment Frequency: (P) 4 Times per Week  Duration: (P) Until Therapy Goals Met    DC Equipment Recommendations: (P) Hand Held Shower, Reacher  Discharge " Recommendations: (P) Anticipate that the patient will have no further occupational therapy needs after discharge from the hospital     Objective     05/11/25 1107   Prior Living Situation   Prior Services Home-Independent   Housing / Facility 2 Story House   Steps Into Home 2   Steps In Home 14   Rail   (R rail then L rail up the stairs)   Bathroom Set up Walk In Shower;Shower Chair  (tubshower downstairs with grab bars)   Equipment Owned Tub / Shower Seat   Lives with - Patient's Self Care Capacity Spouse;Child Less than 18 Years of Age   Comments Pt lives with his wife and 2 step-children ages 14 and 16. Wife works full-time as a dispatcher but is on FMLA to assist PRN. Good support through friends and family.   Prior Level of ADL Function   Self Feeding Independent   Grooming / Hygiene Independent   Bathing Independent   Dressing Independent   Toileting Independent   Prior Level of IADL Function   Medication Management Independent   Laundry Independent   Kitchen Mobility Independent   Finances Independent   Home Management Independent   Shopping Independent   Prior Level Of Mobility Independent Without Device in Community;Independent Without Device in Home   Driving / Transportation Driving Independent   Occupation (Pre-Hospital Vocational) Employed Full Time  ()   History of Falls   History of Falls No   Precautions   Precautions Cardiac Precautions (See Comments);Sternal Precautions (See Comments)   Vitals   O2 Delivery Device None - Room Air   Vitals Comments VSS; no c/o dizziness or light headedness   Pain 0 - 10 Group   Therapist Pain Assessment During Activity;Post Activity Pain Same as Prior to Activity;Nurse Notified  (Increased pain with mobility; not quantified)   Cognition    Cognition / Consciousness WDL   Level of Consciousness Alert   Comments Very pleasant and participatory; receptive to edu   Active ROM Upper Body   Dominant Hand Right   Comments Limited by precautions and recent  shoulder surgeries; functional for ADLs   Strength Upper Body   Comments Limited by pain and precautions; functional for ADLs   Coordination Upper Body   Coordination WDL   Balance Assessment   Sitting Balance (Static) Good   Sitting Balance (Dynamic) Good   Standing Balance (Static) Good   Standing Balance (Dynamic) Good   Weight Shift Sitting Good   Weight Shift Standing Good   Comments No AD in standing   Bed Mobility    Sit to Supine Supervised   Scooting Supervised   Rolling Supervised   Comments HOB elevated with luis of rail; plans to sleep in recliner initially   ADL Assessment   Eating Supervision   Grooming Standby Assist;Standing   Bathing Minimal Assist   Upper Body Dressing Supervision   Lower Body Dressing Maximal Assist   Toileting   (declined need)   Functional Mobility   Sit to Stand Supervised   Bed, Chair, Wheelchair Transfer Supervised   Tub / Shower Transfers Standby Assist   Transfer Method Stand Step   Mobility No AD; chair > shower > bed   Visual Perception   Comments Denies changes   Edema / Skin Assessment   Comments Sternal incision CDI   Activity Tolerance   Comments Limited by fatigue and pain   Patient / Family Goals   Patient / Family Goal #1 To go home   Short Term Goals   Short Term Goal # 1 Pt will complete LB dressing with SPV and AE PRN   Short Term Goal # 2 Pt will complete toileting ADLs includingclothing management with SPV   Short Term Goal # 3 Pt will verbalize and maintain sternal precautions during ADLs/transfers with SPV and no v/cs   Education Group   Education Provided Cardiac Precautions;Sternal Precautions;Energy Conservation;Home Safety;Role of Occupational Therapist;Adaptive Equipment;Activities of Daily Living;Pathology of bedrest   Role of Occupational Therapist Patient Response Patient;Significant Other;Acceptance;Explanation;Verbal Demonstration   Cardiac Precautions Patient Response Patient;Significant Other;Acceptance;Explanation;Verbal Demonstration   Sternal  Precautions Patient Response Patient;Significant Other;Acceptance;Explanation;Verbal Demonstration;Handout;Action Demonstration;Reinforcement Needed   Energy Conservation Patient Response Patient;Significant Other;Acceptance;Explanation;Verbal Demonstration;Reinforcement Needed   Home Safety Patient Response Patient;Significant Other;Acceptance;Explanation;Verbal Demonstration;Reinforcement Needed   ADL Patient Response Patient;Significant Other;Acceptance;Explanation;Verbal Demonstration;Action Demonstration;Reinforcement Needed   Adaptive Equipment Patient Response Patient;Significant Other;Acceptance;Explanation;Verbal Demonstration;Demonstration;Reinforcement Needed   Pathology of Bedrest Patient Response Patient;Significant Other;Acceptance;Explanation;Verbal Demonstration;Action Demonstration;Reinforcement Needed   Occupational Therapy Initial Treatment Plan    Treatment Interventions Self Care / Activities of Daily Living;Adaptive Equipment;Neuro Re-Education / Balance;Therapeutic Exercises;Therapeutic Activity;Family / Caregiver Training   Treatment Frequency 4 Times per Week   Duration Until Therapy Goals Met   Problem List   Problem List Decreased Active Daily Living Skills;Decreased Homemaking Skills;Decreased Upper Extremity Strength Right;Decreased Upper Extremity Strength Left;Decreased Upper Extremity AROM Right;Decreased Upper Extremity AROM Left;Decreased Functional Mobility;Decreased Activity Tolerance;Limited Knowledge of Post Op Precautions   Anticipated Discharge Equipment and Recommendations   DC Equipment Recommendations Hand Held Shower;Reacher   Discharge Recommendations Anticipate that the patient will have no further occupational therapy needs after discharge from the hospital   Interdisciplinary Plan of Care Collaboration   IDT Collaboration with  Family / Caregiver;Nursing   Patient Position at End of Therapy In Bed;Call Light within Reach;Tray Table within Reach;Phone within  Reach;Family / Friend in Room  (Work friends and wife at bedside)   Collaboration Comments OT report and recs; RN updated   Session Information   Date / Session Number  5/11, 1 (1/4, 5/17)

## 2025-05-11 NOTE — THERAPY
"Physical Therapy   Initial Evaluation     Patient Name: Obinna Bryan  Age:  45 y.o., Sex:  male  Medical Record #: [id number]  Today's Date: 5/11/2025     Precautions  Precautions: Cardiac Precautions (See Comments);Sternal Precautions (See Comments)    Assessment  Patient is 45 y.o. male presenting POD3 CABGx4 w/ a PMH of CAD w/ unstable angina, HTN, HLD, obesity, MIRELLA, fatty liver and asthma.  He lives w/ his wife (present throughout) and 2 step-children in a 2SH (states he is able to stay on the 1st floor if needed), and his wife is taking time off work to assist the pt at home (see flowsheet for home set-up and PLOF).    Currently, the pt displays reduced activity tolerance, otherwise near baseline independence w/ functional mobility tasks.  Provided pt and his wife extensive edu re: sternal precautions, use of RPE scale/\"talk test\"/HR to monitor/progress exertion and activity, and home activity modifications w/ good carryover verbalized/demonstrated.  He was able to demo supine<>sit EOB SPV w/ HOB flat and no rails, along w/ STS EOB SPV.  The pt completed gait 500' using no AD SPV via slow tay and wide RAGHAV, no LOB observed and distance limited by RPE 15 (\"talk test\" (-), HR and BP stable throughout).  He was also able to demo ability to ascend/descend 4 steps w/ UE support and SPV for access to his home environment.  Recommend pt f/u w/ Cardiac Rehab OP when indicated given dx, support available at home, and near baseline independence currently demo'd w/ mobility tasks.  Will not follow, please re consult should there be a change in the pt's condition.       Plan    Physical Therapy Initial Treatment Plan   Duration: Evaluation only    DC Equipment Recommendations: None  Discharge Recommendations: Other - (cardiac rehab as indicated)       Subjective/Objective       05/11/25 1514   Prior Living Situation   Prior Services Home-Independent   Housing / Facility 2 Story House  (can stay on 1st floor if needed) " "  Steps Into Home 2   Steps In Home 14   Equipment Owned None   Lives with - Patient's Self Care Capacity Spouse;Child Less than 18 Years of Age   Comments pt lives w/ his wife (present throughout) and 2 step-children.  His wife is taking time off to assist the pt at home as needed   Prior Level of Functional Mobility   Bed Mobility Independent   Transfer Status Independent   Ambulation Independent   Ambulation Distance Community distances   Assistive Devices Used None   Stairs Independent   History of Falls   History of Falls No   Date of Last Fall   (pt denies any recent falls)   Cognition    Cognition / Consciousness WDL   Level of Consciousness Alert   Comments pt pleasant and cooperative   Passive ROM Lower Body   Passive ROM Lower Body WDL   Active ROM Lower Body    Active ROM Lower Body  WDL   Comments observed during functional mobility tasks   Strength Lower Body   Lower Body Strength  WDL   Comments as above   Sensation Lower Body   Lower Extremity Sensation   WDL   Comments pt denies numbness/tingling in his LE's   Coordination Lower Body    Coordination Lower Body  Not Tested   Other Treatments   Other Treatments Provided Extensive edu provided to pt and his wife re: sternal precautions, use of RPE scale/\"talk test\"/HR to monitor/progress exertion and activity, and home activity modifications   Balance Assessment   Sitting Balance (Static) Good   Sitting Balance (Dynamic) Good   Standing Balance (Static) Good   Standing Balance (Dynamic) Good   Weight Shift Sitting Good   Weight Shift Standing Good   Comments stand w/ no AD   Bed Mobility    Supine to Sit Supervised   Sit to Supine Supervised   Scooting Supervised   Rolling Supervised   Comments HOB flat, no rails   Gait Analysis   Gait Level Of Assist Supervised   Assistive Device None   Distance (Feet) 500   # of Times Distance was Traveled 1   Deviation Bradykinetic;Increased Base Of Support   # of Stairs Climbed 4   Level of Assist with Stairs " "Supervised   Weight Bearing Status no restrictions   Vision Deficits Impacting Mobility pt denies   Comments distance limited by RPE 15 (\"talk test\" (-), HR and BP stable throughout)   Functional Mobility   Sit to Stand Supervised   Bed, Chair, Wheelchair Transfer Supervised   Transfer Method Stand Step   Mobility STS EOB w/ no AD; EOB<>hallway w/ no AD   Activity Tolerance   Sitting Edge of Bed 10min   Standing 15min   Edema / Skin Assessment   Edema / Skin  Not Assessed   Education Group   Education Provided Role of Physical Therapist;Sternal Precautions;Cardiac Precautions   Cardiac Precautions Patient Response Patient;Significant Other;Acceptance;Explanation;Demonstration;Handout;Verbal Demonstration;Action Demonstration   Sternal Precautions Patient Response Patient;Significant Other;Acceptance;Explanation;Demonstration;Handout;Verbal Demonstration;Action Demonstration   Role of Physical Therapist Patient Response Patient;Significant Other;Acceptance;Explanation;Demonstration;Action Demonstration   Additional Comments pt and his wife receptive of edu provided   Problem List    Problems Decreased Activity Tolerance   Interdisciplinary Plan of Care Collaboration   IDT Collaboration with  Nursing;Family / Caregiver   Patient Position at End of Therapy In Bed;Call Light within Reach;Tray Table within Reach;Phone within Reach;Family / Friend in Room   Collaboration Comments regarding outcome of tx session   Session Information   Date / Session Number  5/11- eval only         "

## 2025-05-11 NOTE — PROGRESS NOTES
Monitor summary:        Rhythm: SR   Rate: 77-81  Ectopy: n/a  Measurements: .18/.06/.37        12hr chart check

## 2025-05-11 NOTE — PROGRESS NOTES
Cardiovascular Surgery Progress Note    Name: Obinna Bryan  MRN: [id number]  : 1980  Admit Date: 2025  5:22 AM  3 Days Post-Op     Procedure:  Procedure(s) and Anesthesia Type:  Dr Valdovinos; 2025     * CABG, WITH ENDOSCOPIC VEIN PROCUREMENT  X4 - General     * CLIPPING, LEFT ATRIAL APPENDAGE - General     * ECHOCARDIOGRAM, TRANSESOPHAGEAL, INTRAOPERATIVE - General    Vitals:  Vitals:    05/10/25 2250 25 0403 25 0537 25 0730   BP: 104/61 104/61  107/61   Pulse: 74 84  76   Resp: 18 18  17   Temp: 36.8 °C (98.2 °F) 36.2 °C (97.1 °F)  36.2 °C (97.1 °F)   TempSrc: Temporal Temporal  Temporal   SpO2: 97% 97%  97%   Weight:   118 kg (259 lb 11.2 oz)    Height:          Temp (24hrs), Av.8 °C (85.6 °F), Min:3.4 °C (38.1 °F), Max:36.8 °C (98.2 °F)      Respiratory:    Respiration: 17, Pulse Oximetry: 97 %       Fluids:    Intake/Output Summary (Last 24 hours) at 2025 0814  Last data filed at 2025 0537  Gross per 24 hour   Intake 500 ml   Output 5850 ml   Net -5350 ml     Admit weight: Weight: 119 kg (261 lb 7.5 oz)  Current weight: Weight: 118 kg (259 lb 11.2 oz) (25 0537)    Labs:  Recent Labs     25  0200 05/10/25  0204 25  0020   WBC 13.4* 16.1* 13.5*   RBC 4.07* 3.67* 4.07*   HEMOGLOBIN 12.7* 11.5* 12.5*   HEMATOCRIT 37.2* 33.9* 37.4*   MCV 91.4 92.4 91.9   MCH 31.2 31.3 30.7   MCHC 34.1 33.9 33.4   RDW 42.3 43.8 43.0   PLATELETCT 145* 137* 151*   MPV 8.8* 9.3 9.0     Recent Labs     25  0200 25  0500 05/10/25  0204 25  0020   SODIUM 137  --  136 137   POTASSIUM 4.6 4.2 4.5 3.8   CHLORIDE 105  --  103 102   CO2 24  --  27 27   GLUCOSE 200*  --  138* 68   BUN 20  --  18 15   CREATININE 0.98  --  0.80 0.85   CALCIUM 7.7*  --  8.3* 8.3*     Recent Labs     25  1249   APTT 53.2*   INR 1.52*       HgbA1c:  5.9    Diabetic Educator Consult:  N/A    Medications:  Scheduled Medications   Medication Dose Frequency    furosemide  20 mg BID     potassium chloride SA  20 mEq DAILY    enoxaparin (LOVENOX) injection  40 mg DAILY AT 1800    Nozin nasal  swab  1 Applicator BID    metoprolol tartrate  25 mg BID    aspirin  81 mg DAILY    clopidogrel  75 mg DAILY    acetaminophen  1,000 mg Q6HRS    senna-docusate  2 Tablet BID    And    polyethylene glycol/lytes  1 Packet DAILY    And    magnesium hydroxide  30 mL DAILY    omeprazole  20 mg DAILY    rosuvastatin  20 mg QHS        Exam:   Physical Exam  Vitals and nursing note reviewed.   Constitutional:       General: He is not in acute distress.     Appearance: Normal appearance.   HENT:      Head: Normocephalic.      Right Ear: External ear normal.      Left Ear: External ear normal.      Nose: Nose normal. No congestion.      Mouth/Throat:      Mouth: Mucous membranes are moist.      Pharynx: Oropharynx is clear.   Eyes:      Extraocular Movements: Extraocular movements intact.      Pupils: Pupils are equal, round, and reactive to light.   Cardiovascular:      Rate and Rhythm: Normal rate and regular rhythm.      Pulses: Normal pulses.      Heart sounds: Normal heart sounds.   Pulmonary:      Effort: Pulmonary effort is normal.   Abdominal:      General: There is no distension.      Palpations: Abdomen is soft.   Musculoskeletal:      Cervical back: Normal range of motion and neck supple. No tenderness.   Skin:     General: Skin is warm and dry.      Comments: Sternum: open to air, CDI  SVG site: open to air, CDI   Neurological:      General: No focal deficit present.      Mental Status: He is alert and oriented to person, place, and time. Mental status is at baseline.   Psychiatric:         Mood and Affect: Mood normal.         Behavior: Behavior normal.         Thought Content: Thought content normal.         Cardiac Medications:    ASA - Yes    Plavix - Yes    Post-operative Beta Blockers - Yes    Ace/ARB- No; contraindicated because of Normal EF    ARNI-  No; contraindicated because of Normal  EF    Statin - Yes    Aldactone- No; contraindicated because of Normal EF    SGLT2i-  No; contraindicated because of Normal EF    Ejection Fraction:  55%    Telemetry:   5/9 SR  5/10 SR  5/11 SR/ST to 103    Assessment/Plan:  POD 1  HDS, SR, neuro intact, wounds intact- prevena, abdomen soft, fluid balance positive, wt up, 3 L NC, moderate chest tube output.  Plan:  Keep chest tubes and pacing wires. Diurese when able. IS/ambulate.     POD 2 HDS, SR, room air/1L NC. Neuro intact, wounds CDI. Abd soft, NT, BM -. Fluid up, wt up, Cr: 0.80. Chest tubes with 240cc out overnight, H/h: 11.5/33.9. Plan: Remove pacer wires and domínguez. Remove chest tubes if output less than 200cc at noon. Diurese, wean O2. Transfer to tele. Therapies eval    POD 3 HDS, SR, room air. Neuro intact, wounds CDI. ABD soft, NT, BM -. Fluid near equal, wt below admit, Cr: 0.85. Plan: Reduce diuresis. Escalate bowel protocol. Anticipate home tomorrow. Therapies eval pending.     Disposition:  PT-eval pending  OT-eval pending

## 2025-05-11 NOTE — PROGRESS NOTES
Report received from NOC shift RN. Patient care assumed.On telebox confirmed by monitor, Ao x4, VSS. Educated on call light use. Bed locked in lowest position with two rails up. Plan of care reviewed and white board updated. Call light within reach.

## 2025-05-11 NOTE — CARE PLAN
The patient is Stable - Low risk of patient condition declining or worsening    Shift Goals  Clinical Goals: pain control, ambulation, rest, comfort  Patient Goals: rest, sleep  Family Goals: Updates, safety, comfort    Progress made toward(s) clinical / shift goals:      Problem: Post Op Day 3 CABG/Heart Valve replacement  Goal: Optimal care of the post op CABG/Heart Valve replacement post op day 3  Outcome: Progressing  Intervention: Daily weights in the morning  Note: Daily weight was complete. Current weight is 118kg  Intervention: Shower daily and clean incisions twice daily with soap and water  Note: Incision care provided with soap and water.  Intervention: Ambulate 4 times daily, increasing the distance each time  Note: Patient ambulated twice during NOC shift, both times for 500 feet.  Intervention: IS q 1 hour while awake and record best IS volume  Note: IS was used whenever the patient was awake. Best IS volume is 1500.  Intervention: Consider removal of domínguez, chest tube and pacer wires if not already done  Note: Domínguez, chest tube, and pacer wires were all removed prior to the patient's arriving to the floor.     Problem: Pain - Standard  Goal: Alleviation of pain or a reduction in pain to the patient’s comfort goal  Outcome: Progressing       Patient is not progressing towards the following goals:

## 2025-05-12 ENCOUNTER — PHARMACY VISIT (OUTPATIENT)
Dept: PHARMACY | Facility: MEDICAL CENTER | Age: 45
End: 2025-05-12
Payer: COMMERCIAL

## 2025-05-12 ENCOUNTER — RESULTS FOLLOW-UP (OUTPATIENT)
Dept: CARDIOLOGY | Facility: MEDICAL CENTER | Age: 45
End: 2025-05-12

## 2025-05-12 VITALS
HEIGHT: 71 IN | OXYGEN SATURATION: 95 % | DIASTOLIC BLOOD PRESSURE: 81 MMHG | RESPIRATION RATE: 16 BRPM | BODY MASS INDEX: 36.14 KG/M2 | WEIGHT: 258.16 LBS | HEART RATE: 84 BPM | SYSTOLIC BLOOD PRESSURE: 109 MMHG | TEMPERATURE: 97.7 F

## 2025-05-12 LAB
ANION GAP SERPL CALC-SCNC: 10 MMOL/L (ref 7–16)
BUN SERPL-MCNC: 12 MG/DL (ref 8–22)
CALCIUM SERPL-MCNC: 8.6 MG/DL (ref 8.5–10.5)
CHLORIDE SERPL-SCNC: 104 MMOL/L (ref 96–112)
CO2 SERPL-SCNC: 25 MMOL/L (ref 20–33)
CREAT SERPL-MCNC: 0.87 MG/DL (ref 0.5–1.4)
ERYTHROCYTE [DISTWIDTH] IN BLOOD BY AUTOMATED COUNT: 42.5 FL (ref 35.9–50)
GFR SERPLBLD CREATININE-BSD FMLA CKD-EPI: 108 ML/MIN/1.73 M 2
GLUCOSE SERPL-MCNC: 106 MG/DL (ref 65–99)
HCT VFR BLD AUTO: 40.2 % (ref 42–52)
HGB BLD-MCNC: 14.2 G/DL (ref 14–18)
LV EJECT FRACT  99904: 60
MCH RBC QN AUTO: 31.8 PG (ref 27–33)
MCHC RBC AUTO-ENTMCNC: 35.3 G/DL (ref 32.3–36.5)
MCV RBC AUTO: 90.1 FL (ref 81.4–97.8)
PLATELET # BLD AUTO: 211 K/UL (ref 164–446)
PMV BLD AUTO: 8.8 FL (ref 9–12.9)
POTASSIUM SERPL-SCNC: 4.2 MMOL/L (ref 3.6–5.5)
RBC # BLD AUTO: 4.46 M/UL (ref 4.7–6.1)
SODIUM SERPL-SCNC: 139 MMOL/L (ref 135–145)
WBC # BLD AUTO: 14.9 K/UL (ref 4.8–10.8)

## 2025-05-12 PROCEDURE — 700101 HCHG RX REV CODE 250: Performed by: NURSE PRACTITIONER

## 2025-05-12 PROCEDURE — A9270 NON-COVERED ITEM OR SERVICE: HCPCS | Performed by: NURSE PRACTITIONER

## 2025-05-12 PROCEDURE — 700102 HCHG RX REV CODE 250 W/ 637 OVERRIDE(OP): Performed by: NURSE PRACTITIONER

## 2025-05-12 PROCEDURE — 99024 POSTOP FOLLOW-UP VISIT: CPT | Performed by: NURSE PRACTITIONER

## 2025-05-12 PROCEDURE — 80048 BASIC METABOLIC PNL TOTAL CA: CPT

## 2025-05-12 PROCEDURE — RXMED WILLOW AMBULATORY MEDICATION CHARGE: Performed by: NURSE PRACTITIONER

## 2025-05-12 PROCEDURE — 85027 COMPLETE CBC AUTOMATED: CPT

## 2025-05-12 RX ORDER — METOPROLOL TARTRATE 25 MG/1
25 TABLET, FILM COATED ORAL 2 TIMES DAILY
Qty: 60 TABLET | Refills: 2 | Status: SHIPPED | OUTPATIENT
Start: 2025-05-12

## 2025-05-12 RX ORDER — CLOPIDOGREL BISULFATE 75 MG/1
75 TABLET ORAL DAILY
Qty: 30 TABLET | Refills: 2 | Status: SHIPPED | OUTPATIENT
Start: 2025-05-13

## 2025-05-12 RX ORDER — ACETAMINOPHEN 500 MG
1000 TABLET ORAL EVERY 6 HOURS
COMMUNITY
Start: 2025-05-12

## 2025-05-12 RX ORDER — SODIUM PHOSPHATE,MONO-DIBASIC 19G-7G/118
1 ENEMA (ML) RECTAL ONCE
Status: COMPLETED | OUTPATIENT
Start: 2025-05-12 | End: 2025-05-12

## 2025-05-12 RX ORDER — OXYCODONE HYDROCHLORIDE 5 MG/1
5 TABLET ORAL EVERY 6 HOURS PRN
Qty: 56 TABLET | Refills: 0 | Status: SHIPPED | OUTPATIENT
Start: 2025-05-12 | End: 2025-05-26

## 2025-05-12 RX ADMIN — POLYETHYLENE GLYCOL 3350 1 PACKET: 17 POWDER, FOR SOLUTION ORAL at 05:05

## 2025-05-12 RX ADMIN — ASPIRIN 81 MG: 81 TABLET, COATED ORAL at 05:04

## 2025-05-12 RX ADMIN — SODIUM PHOSPHATE, DIBASIC AND SODIUM PHOSPHATE, MONOBASIC 1 ENEMA: 7; 19 ENEMA RECTAL at 11:29

## 2025-05-12 RX ADMIN — SENNOSIDES AND DOCUSATE SODIUM 2 TABLET: 50; 8.6 TABLET ORAL at 05:05

## 2025-05-12 RX ADMIN — ACETAMINOPHEN 1000 MG: 500 TABLET ORAL at 12:12

## 2025-05-12 RX ADMIN — METOPROLOL TARTRATE 25 MG: 25 TABLET, FILM COATED ORAL at 05:04

## 2025-05-12 RX ADMIN — OMEPRAZOLE 20 MG: 20 CAPSULE, DELAYED RELEASE ORAL at 05:04

## 2025-05-12 RX ADMIN — CLOPIDOGREL BISULFATE 75 MG: 75 TABLET, FILM COATED ORAL at 05:05

## 2025-05-12 RX ADMIN — ACETAMINOPHEN 1000 MG: 500 TABLET ORAL at 00:04

## 2025-05-12 RX ADMIN — Medication 1 APPLICATOR: at 07:51

## 2025-05-12 RX ADMIN — ACETAMINOPHEN 1000 MG: 500 TABLET ORAL at 05:04

## 2025-05-12 RX ADMIN — OXYCODONE 5 MG: 5 TABLET ORAL at 05:08

## 2025-05-12 ASSESSMENT — PAIN DESCRIPTION - PAIN TYPE: TYPE: ACUTE PAIN

## 2025-05-12 ASSESSMENT — FIBROSIS 4 INDEX: FIB4 SCORE: 1.21

## 2025-05-12 NOTE — DISCHARGE PLANNING
Discharge Appointments/outpatient referrals/ and HH set up:    Cardiac surgery follow up appointment made for 4-5 weeks out    Cardiology f/u appointment for MD or APRN within 3-4 weeks.

## 2025-05-12 NOTE — CARE PLAN
The patient is Stable - Low risk of patient condition declining or worsening    Shift Goals  Clinical Goals: (P) patient stability and comfort  Patient Goals: (P) have a bowel movement  Family Goals: (P) JOSUÉ    Progress made toward(s) clinical / shift goals:    Problem: Hemodynamics  Goal: Patient's hemodynamics, fluid balance and neurologic status will be stable or improve  Description: Target End Date:  Prior to discharge or change in level of careDocument on Assessment and I/O flowsheet templates1.  Monitor vital signs, pulse oximetry and cardiac monitor per provider order and/or policy2.  Maintain blood pressure per provider order3.  Hemodynamic monitoring per provider order4.  Manage IV fluids and IV infusions5.  Monitor intake and output6.  Daily weights per unit policy or provider order7.  Assess peripheral pulses and capillary refill8.  Assess color and body temperature9.  Position patient for maximum circulation/cardiac [id number]. Monitor for signs/symptoms of excessive [id number]. Assess mental status, restlessness and changes in level of [id number]. Monitor temperature and report fever or hypothermia to provider immediately. Consideration of targeted temperature management.  Target End Date:  Prior to discharge or change in level of careDocument on Assessment and I/O flowsheet templates1.  Monitor vital signs, pulse oximetry and cardiac monitor per provider order and/or policy2.  Maintain blood pressure per provider order3.  Hemodynamic monitoring per provider order4.  Manage IV fluids and IV infusions5.  Monitor intake and output6.  Daily weights per unit policy or provider order7.  Assess peripheral pulses and capillary refill8.  Assess color and body temperature9.  Position patient for maximum circulation/cardiac [id number]. Monitor for signs/symptoms of excessive [id number]. Assess mental status, restlessness and changes in level of [id number]. Monitor temperature and report fever or  hypothermia to provider immediately. Consideration of targeted temperature management.  Outcome: Progressing     Problem: Post Op Day 3 CABG/Heart Valve replacement  Goal: Optimal care of the post op CABG/Heart Valve replacement post op day 3  Outcome: Progressing   ntervention: Daily weights in the morning  Note: Completed in the morning     Intervention: Shower daily and clean incisions twice daily with soap and water  Note:  Incision care done with no rinse soap and guaze during shower.     Intervention: Ambulate 4 times daily, increasing the distance each time  Note: Ambulated patient 2 times during this RN shift 1000ft.     Intervention: IS q 1 hour while awake and record best IS volume  Note: Encouraged IS use, best IS pull witnessed is 1500      Intervention: Consider removal of domínguez, chest tube and pacer wires if not already done  Note: Domínguez and chest tubes removed prior to arrival to floor.  Intervention: Assess need for case management and  for discharge planning.  Note: pt is not medically cleared      Patient is not progressing towards the following goals:

## 2025-05-12 NOTE — PROGRESS NOTES
Monitor Summary  Rhythm: SR, ST  Rate:   Ectopy: none  Measurements: 0.12/0.09/0.34      ---12 hr Chart Review---

## 2025-05-12 NOTE — PROGRESS NOTES
Monitor summary:        Rhythm: SR   Rate: 74-97  Ectopy: N/A  Measurements: .30/.10/.41        12hr chart check

## 2025-05-12 NOTE — DISCHARGE SUMMARY
DISCHARGE SUMMARY    ADMISSION DATE: 5/6/2025    DISCHARGE DATE: 5/12/25    ADMITTING DIAGNOSES: multivessel CAD with unstable angina, HTN, HLD, palpitations and high risk for post op arrhythmia with urgent surgery in setting of ACS with high CHADSVasc score of 2, obesity BMI 36, MIRELLA, fatty liver disease, exercise-induced asthma     DISCHARGE DIAGNOSES: multivessel CAD with unstable angina, HTN, HLD, palpitations and high risk for post op arrhythmia with urgent surgery in setting of ACS with high CHADSVasc score of 2, obesity BMI 36, MIRELLA, fatty liver disease, exercise-induced asthma     PROCEDURES PERFORMED: 5/12/25 Dr Valdovinos  Coronary artery bypass grafting x4  Left internal mammary artery to left anterior descending artery  Reversed saphenous vein graft to ramus artery  Reversed saphenous vein graft to obtuse marginal artery #1  Reversed saphenous vein graft to posterior descending artery  Endo-vein procurement  Left Atrial appendage ligation with 40mm Atriclip    HISTORY OF PRESENT ILLNESS:  The patient is a 45 y.o. male  with  past medical history significant for hyperlipidemia, exercise induced asthma, fatty liver disease, and obstructive sleep apnea, who was being evaluated in the outpatient setting by cardiology for anginal symptoms that have been occurring for several months, and increasing in frequency as of late. He has actually had trips to ED recently for escalation of symptoms. He underwent a stress test that was compatible with ischemia to the LAD territory, per cardiologist interpretation, as official read was negative. This prompted further work up with a left heart catheterization revealing multivessel coronary artery disease including a quite ominous proximal LAD lesion prompting admission and  consultation of our service.      The patient was seen and evaluated at bedside and endorses the above history. He currently reports no active symptoms. He denies family history of early CAD  "(grandfather had MI in his 70's, father  of brain cancer). He lives with his wife.     The patient has a \"friend that's like family\" in the room for the entirety of the consultation.    HOSPITAL COURSE:   POD 1  HDS, SR, neuro intact, wounds intact- prevena, abdomen soft, fluid balance positive, wt up, 3 L NC, moderate chest tube output.  Plan:  Keep chest tubes and pacing wires. Diurese when able. IS/ambulate.      POD 2 HDS, SR, room air/1L NC. Neuro intact, wounds CDI. Abd soft, NT, BM -. Fluid up, wt up, Cr: 0.80. Chest tubes with 240cc out overnight, H/h: 11.5/33.9. Plan: Remove pacer wires and domínguez. Remove chest tubes if output less than 200cc at noon. Diurese, wean O2. Transfer to tele. Therapies eval     POD 3 HDS, SR, room air. Neuro intact, wounds CDI. ABD soft, NT, BM -. Fluid near equal, wt below admit, Cr: 0.85. Plan: Reduce diuresis. Escalate bowel protocol. Anticipate home tomorrow. Therapies eval pending.     POD 4 HDS. SR. RA. Neuro intact.  Wounds CDI.  Abdomen soft nontender.  Cr 0.87 Hgb 14.2.  Diuresed well.  Plan: Enema today.  If +BM ok to DC home otherwise keep today.  Encourage IS/ambulation.    TELEMETRY:   SR  5/10 SR  5/11 SR/ST to 103  5 SR 80s    RECENT LABS:     Lab Results   Component Value Date/Time    SODIUM 139 2025 12:10 AM    POTASSIUM 4.2 2025 12:10 AM    CHLORIDE 104 2025 12:10 AM    CO2 25 2025 12:10 AM    GLUCOSE 106 (H) 2025 12:10 AM    BUN 12 2025 12:10 AM    CREATININE 0.87 2025 12:10 AM      Lab Results   Component Value Date/Time    WBC 14.9 (H) 2025 12:10 AM    RBC 4.46 (L) 2025 12:10 AM    HEMOGLOBIN 14.2 2025 12:10 AM    HEMATOCRIT 40.2 (L) 2025 12:10 AM    MCV 90.1 2025 12:10 AM    MCH 31.8 2025 12:10 AM    MCHC 35.3 2025 12:10 AM    MPV 8.8 (L) 2025 12:10 AM    NEUTSPOLYS 51.30 2025 08:32 AM    LYMPHOCYTES 35.10 2025 08:32 AM    MONOCYTES 9.20 2025 " 08:32 AM    EOSINOPHILS 3.20 04/11/2025 08:32 AM    BASOPHILS 0.90 04/11/2025 08:32 AM      Lab Results   Component Value Date/Time    PROTHROMBTM 18.4 (H) 05/08/2025 12:49 PM    INR 1.52 (H) 05/08/2025 12:49 PM        Fluids:    Intake/Output Summary (Last 24 hours) at 5/12/2025 0903  Last data filed at 5/12/2025 0600  Gross per 24 hour   Intake 780 ml   Output 1700 ml   Net -920 ml     Admit weight: Weight: 119 kg (261 lb 7.5 oz)  Current weight: Weight: 117 kg (258 lb 2.5 oz) (05/12/25 0516)    ALLERGIES:     Patient has no known allergies.    EJECTION FRACTION:  55%    CARDIAC MEDICATIONS:    ASA - Yes    Plavix - Yes    Post-operative Beta Blockers - Yes    Ace/ARB- No; contraindicated because of Normal EF    ARNI-  No; contraindicated because of Cost    Statin - Yes    Aldactone- No; contraindicated because of Normal EF    SGLT2i-  No; contraindicated because of Normal EF    DISCHARGE MEDICATIONS:      Medication List        START taking these medications        Instructions   acetaminophen 500 MG Tabs  Commonly known as: Tylenol   Take 2 Tablets by mouth every 6 hours.  Dose: 1,000 mg     clopidogrel 75 MG Tabs  Start taking on: May 13, 2025  Commonly known as: Plavix   Take 1 Tablet by mouth every day.  Dose: 75 mg     metoprolol tartrate 25 MG Tabs  Commonly known as: Lopressor   Take 1 Tablet by mouth 2 times a day.  Dose: 25 mg     oxyCODONE immediate-release 5 MG Tabs  Commonly known as: Roxicodone   Take 1 Tablet by mouth every 6 hours as needed for Severe Pain for up to 14 days.  Dose: 5 mg            CONTINUE taking these medications        Instructions   albuterol 108 (90 Base) MCG/ACT Aers inhalation aerosol   Doctor's comments: Give albuterol that is patient or insurance preference please  Inhale 2 Puffs every four hours as needed for Shortness of Breath.  Dose: 2 Puff     aspirin 81 MG EC tablet   Take 81 mg by mouth every day.  Dose: 81 mg     rosuvastatin 20 MG Tabs  Commonly known as: Crestor    Take 1 Tablet by mouth every day.  Dose: 20 mg            STOP taking these medications      metoprolol SR 25 MG Tb24  Commonly known as: Toprol XL              NARCOTIC PAIN MEDICATIONS:   In prescribing controlled substances to this patient, I certify that I have obtained and reviewed their medical history. I have also made a good lexis effort to obtain applicable records from other providers who have treated the patient .    I have conducted a physical exam and documented it. I have reviewed the patient's prescription history as maintained by the [place] Prescription Monitoring Program.     I have assessed the patient’s risk for abuse, dependency, and addiction using the validated Opioid Risk Tool.    Given the above, I believe the benefits of controlled substance therapy outweigh the risks. The reasons for prescribing controlled substances include non-narcotic, oral analgesic alternatives have been inadequate for pain control. Accordingly, I have discussed the risk and benefits, treatment plan, and alternative therapies with the patient.     Pt understands this prescription is a controlled substance which is potentially habit-forming and its use is regulated by the [facility]. It must be submitted to the pharmacy within 5 days of the date written and can not be called in or faxed to the pharmacy. Refills are subject to terms of a medicine agreement. Any refill requires a new prescription that must be obtained from this office during regular office hours Monday through Thursday 7 am to 4 pm. We ask for 72 hours notice to get an appointment for a narcotic pain medication refill. This medicine can cause nausea, significant constipation, sedation, confusion.     DIET:   Cardiac diet    DISCHARGE INSTRUCTIONS DISCUSSED WITH THE PATIENT:      1. NO driving for 4 weeks after surgery. You may ride as a passenger.  2. NO lifting of any item over 10 lbs (e.g. gallon of milk) for 6 weeks after surgery.  3. DO walk as much as  possible! Walk a minimum of once a day. Depending on your fatigue and comfort level, you may walk as much as you wish. There is no maximum.  4. Other physical activities (sex, housework, gardening, etc.) are OK after 4 weeks   5. Continue using incentive spirometer for 2 weeks, especially if going home on oxygen.    Incision Care:  1. SHOWER ONLY - no baths. Clean incision daily with plain Ivory ® soap or any other dye or perfume free soap. Then pat incision dry with clean towel. Avoid creams or lotions on the incision(s).  a. If there is any increase in redness or swelling, or separation of the incision line, or thick drainage* from any of the incisions, call right away  * Clear, thin drainage is not abnormal especially from the leg incision and/or                         chest tube sites.  2. Continue to wear your DANK Stockings for 4 weeks. You may take off the stockings when in bed or when the legs are elevated.    Patient instructed to call [facility] [place] cardiac surgery at [phone]  if any increased shortness of breath, uncontrolled pain, weight gain greater than 3 pounds in 1 day or 5 pounds in 1 week, SBP >140, HR <60 or redness swelling or drainage of incisions.      FOLLOW-UP:   Future Appointments   Date Time Provider Department Center   6/5/2025 11:15 AM CHANNING Mendoza [facility] None   6/9/2025  1:00 PM CT RESOURCE PROVIDER [facility] None

## 2025-05-12 NOTE — PROGRESS NOTES
Bedside report received from NOC RN. Assumed care of pt. Pt awake, laying in bed. A/Ox4, VSS. No concerns, complaints or distress. Pt educated to call before getting out of bed. POC reviewed and white board updated. Tele box on. SR 80 on the monitor. Call light in reach. Bed locked in lowest position with 2 upper bed rails up. Bed alarm on.

## 2025-05-12 NOTE — CARE PLAN
The patient is Stable - Low risk of patient condition declining or worsening    Shift Goals  Clinical Goals: pain conterol, ambulation  Patient Goals: go home in the morning  Family Goals: (P) JOSUÉ    Progress made toward(s) clinical / shift goals:      Problem: Post Op Day 4 CABG/Heart Valve Replacement  Goal: Optimal care of the Post Op CABG/Heart Valve replacement Post Op Day 4  Outcome: Progressing  Intervention: Daily weights in the morning  Note: Daily weight was complete. Current weight is 117.1kg.  Intervention: Shower daily and clean incisions twice daily with soap and water  Note: Incision care was complete with soap and water.  Intervention: Ambulate 4 times daily, increasing the distance each time  Note: Patient ambulated 2 times during NOC shift for 500 feet each time.  Intervention: IS q 1 hour while awake and record best IS volume  Note: The patient used IS whenever awake. The best volume is 1500.  Intervention: Consider removal of domínguez, chest tube and pacer wires if not already done  Note: Pacer wires, domínguez, and chest tube were all removed prior to the patient's arrival to the floor.     Problem: Pain - Standard  Goal: Alleviation of pain or a reduction in pain to the patient’s comfort goal  Outcome: Progressing       Patient is not progressing towards the following goals:

## 2025-05-12 NOTE — PROGRESS NOTES
Patient discharged. A&O x 4. Discharge instructions, personal belongings in possession of a patient. CVC and tele monitor removed.  Copy of discharge instructions in the patient chart, signed and reviewed. Patient verbalizes the understanding of the discharge instructions. Questions / concerns addressed prior to leaving the unit. Patient is instructed to follow up with PCP and CT surgery.Transported via wheelchair.  Patient is discharged to home. Family is present.

## 2025-05-12 NOTE — DISCHARGE PLANNING
"Discharge Discussion and home care recap prior to discharge:      Discharge review was completed with patient and family.    Patient was reminded that outpatient cardiac rehab beginning 6 weeks post op. They were told it is highly recommended and available to them if desired, but not required. They were told to look at the provided flyer for the contact number and additional information.    Patient was reminded to utilize the vitals log book provided to take his/her weight daily and record.    Patient was told to call me with weight gain > 3 lbs in 1 day or 5 lbs in 1 week  Patient was also told to record heart rate and blood pressure morning and night; and to call for concerning trends.    Patient was reminded to review the \"recovery after heart surgery\" packet with information on normal expectations such as clicking in the chest, loud/pounding heart/ hot and cold flashes, weight loss, constipation, insomnia, tingling on left side of chest (CABG with LIMA).  I also reviewed the decision tree of whom to call and when with concerns after going home. RN navigator Monday through Friday during business hours for any questions or urgent concerns, and the office for urgent concerns at night or on the weekends.    I briefly recapped the discharge instructions that their primary RN will review in depth prior to discharge   1) appointments   2) medication reconciliation (start, continue, change, stop)   3) care instructions    All additional questions were answered or deferred to the bedside RN.    Event time 35 minutes    "

## 2025-05-12 NOTE — DISCHARGE INSTRUCTIONS
DIVISION OF CARDIAC SURGERY   DISCHARGE INSTRUCTIONS    Activity:    NO driving for 4 weeks after surgery. You may ride as a passenger.  NO lifting, pushing, or pulling more than 10 pounds for 6 weeks.  For the next 6 weeks, keep your elbows close to your body and move within a pain-free motion when lifting, pushing or pulling.  Do not stretch both arms backwards at the same time.    Walk at least 4 times per day, there is no maximum. The goal is to increase your distance over time.  Continue using incentive spirometer for 2 weeks or until your baseline volume is reached.  If you are going home on oxygen and you were not on oxygen prior to surgery, keep using until you are oxygen free.  Weigh yourself daily.  Call your Cardiologist for a weight gain of 3 or more pounds in 1 day or more than 5 pounds in 7 days.  Take all of your medications as prescribed. Do not use a pill box for the first month at home. If you have questions, please call your nurse navigator at [phone].  Continue to wear the DANK (compression) stockings for 2-4 weeks or until all swelling is gone. You may take them off when you are in bed or when your legs are elevated.    Incision Care:    Make sure to clean your incision(s) TWICE DAILY.  Once by showering AND once using the no rinse Foam cleanser provided in the hospital.  During the shower, cleanse the incision(s) with a perfume and dye free soap (Dial, Dove, [place])  Use gentle pressure and rub up and down over incision with your hands or a washcloth. Rinse off and pat incision(s) dry with clean towel.  Keep the incision open to air. No creams or lotions on your incision(s). No baths.  If there is any increased redness or swelling, separation of the incision line, or thick drainage from any of your incisions, call the Cardiac Surgeons ([phone]).      General Instructions:    You have been referred to Cardiac Rehab.  You can start Cardiac Rehab 30 days after surgery.  If you do  not have an appointment at the time of discharge call [phone] to schedule an appointment.  Your Primary Care Doctor typically handles home oxygen. Oxygen may be stopped when your oxygen level is consistently greater than 90.  Check with your Primary Care Doctor if you are unsure.  Take all of your medications (including pain medications) as prescribed.  Taking medications other than prescribed can result in serious injury.    For Patients Discharged with Narcotic Pain Medication:     If a refill is needed, understand that only 1 refill will be provided and you must come to the Cardiac Surgeons’ office for an appointment (72 hours’ notice is required to schedule and there are no weekend appointments).  If the pain medications you are discharged on are not working, you will need to bring your remaining prescription into the office in order to receive a new prescription.  If you were taking narcotics prior to your heart surgery, the Cardiac Surgeons will provide you with one prescription and additional medications will need to be provided by your pain management doctor.  Do not drink alcohol while taking narcotics.  Lost or stolen medications will not be refilled.  If medications are stolen, report to law enforcement.    Contact Cardiac Surgery at [phone] if you have any questions.

## 2025-05-13 ENCOUNTER — APPOINTMENT (OUTPATIENT)
Dept: MEDICAL GROUP | Facility: PHYSICIAN GROUP | Age: 45
End: 2025-05-13
Payer: COMMERCIAL

## 2025-05-14 NOTE — PROGRESS NOTES
"CHIEF COMPLAINT: Post-op visit     PROCEDURE:   Dr Valdovinos; 5/6/25  Coronary artery bypass grafting x4  Left internal mammary artery to left anterior descending artery  Reversed saphenous vein graft to ramus artery  Reversed saphenous vein graft to obtuse marginal artery #1  Reversed saphenous vein graft to posterior descending artery  Endo-vein procurement  Left Atrial appendage ligation with 40mm Atriclip    HPI: Patient presents to post op clinic. He reports feeling well. He walks every day. He is planing to do cardiac rehab at the end of the month. He has mild sternal pain and takes tylenol.       /62 (BP Location: Left arm, Patient Position: Sitting, BP Cuff Size: Adult)   Pulse 97   Temp 36.4 °C (97.5 °F) (Temporal)   Ht 1.803 m (5' 11\")   Wt 111 kg (244 lb 11.4 oz)   SpO2 95%     PHYSICAL EXAM:  Cardiac: S1S2  Neuro:  AAO x 3  Resp:  CTA  Wounds:  CDI  Sternum:  Stable      PLAN: Discharge from cardiac surgery clinic.   Continue plavix for 3 months or per your Cardiologist's recommendations.  We reviewed post operative sternal precautions, weight limits and driving precautions moving forward.  We reviewed SBE prophylaxis.      Overall, we are very pleased with the patient’s progress and we have instructed the patient to follow-up with us in the future should they have any concerns related to their surgery. Otherwise, we will see the patient on a PRN basis. The patient will continue to follow-up with their Cardiologist and PCP.  The patient has been informed that any further prescription refills should be done through their primary care physician and/or cardiologist.  They acknowledged understanding.  Thank you for allowing us to participate in the care of this very pleasant patient and please let us know if there is any way we may be of further assistance.    "

## 2025-05-23 ENCOUNTER — TELEPHONE (OUTPATIENT)
Facility: MEDICAL CENTER | Age: 45
End: 2025-05-23
Payer: COMMERCIAL

## 2025-05-23 NOTE — TELEPHONE ENCOUNTER
Hospital follow up call    he states that the post op pain is well controlled.  Narcotics are occasionally being utilized. Tylenol is being utilized.    he is showering everyday or every other day.    he states that all incisions are healing well and approximated with no s/s of infection (redness, puss, fever, purulent drainage, or tenderness).    he is using the IS; volume is improved.     he is walking everyday, distance is increased from the hospital.    he has had a bowel movement since discharge.    he is obtaining daily weights. Current weight is less than the first home weight. he has no LE edema.    he is taking all prescribed meds as directed.  he has medication questions.    he is taking vitals (HR and BP).    BP's: 100 - 110's/70s  HR's: 70 - 80's    he has no additional questions at this time. Follow up appointments were reviewed and I ensured they have my number if issues or concerns arise.      Follow up call time was 10 minutes.

## 2025-06-05 ENCOUNTER — TELEPHONE (OUTPATIENT)
Dept: HEALTH INFORMATION MANAGEMENT | Facility: OTHER | Age: 45
End: 2025-06-05
Payer: COMMERCIAL

## 2025-06-05 ENCOUNTER — OFFICE VISIT (OUTPATIENT)
Dept: CARDIOLOGY | Facility: MEDICAL CENTER | Age: 45
End: 2025-06-05
Payer: COMMERCIAL

## 2025-06-05 VITALS
HEIGHT: 71 IN | OXYGEN SATURATION: 96 % | BODY MASS INDEX: 34.44 KG/M2 | RESPIRATION RATE: 16 BRPM | WEIGHT: 246 LBS | SYSTOLIC BLOOD PRESSURE: 92 MMHG | HEART RATE: 86 BPM | DIASTOLIC BLOOD PRESSURE: 64 MMHG

## 2025-06-05 DIAGNOSIS — E66.9 OBESITY, UNSPECIFIED CLASS, UNSPECIFIED OBESITY TYPE, UNSPECIFIED WHETHER SERIOUS COMORBIDITY PRESENT: ICD-10-CM

## 2025-06-05 DIAGNOSIS — R93.1 AGATSTON CORONARY ARTERY CALCIUM SCORE GREATER THAN 400: ICD-10-CM

## 2025-06-05 DIAGNOSIS — Z95.1 S/P CABG X 4: ICD-10-CM

## 2025-06-05 DIAGNOSIS — Z95.1 HX OF CABG: Primary | ICD-10-CM

## 2025-06-05 DIAGNOSIS — E78.2 MIXED HYPERLIPIDEMIA: ICD-10-CM

## 2025-06-05 DIAGNOSIS — I25.10 CAD IN NATIVE ARTERY: ICD-10-CM

## 2025-06-05 DIAGNOSIS — R06.02 SHORTNESS OF BREATH: ICD-10-CM

## 2025-06-05 PROBLEM — R94.31 ABNORMAL EKG: Status: RESOLVED | Noted: 2025-04-08 | Resolved: 2025-06-05

## 2025-06-05 PROBLEM — J45.909 ASTHMA: Status: RESOLVED | Noted: 2025-05-08 | Resolved: 2025-06-05

## 2025-06-05 PROBLEM — Z99.11 ENCOUNTER FOR WEANING FROM VENTILATOR (HCC): Status: RESOLVED | Noted: 2025-05-08 | Resolved: 2025-06-05

## 2025-06-05 PROCEDURE — 3074F SYST BP LT 130 MM HG: CPT

## 2025-06-05 PROCEDURE — 99214 OFFICE O/P EST MOD 30 MIN: CPT

## 2025-06-05 PROCEDURE — 3078F DIAST BP <80 MM HG: CPT

## 2025-06-05 PROCEDURE — 99213 OFFICE O/P EST LOW 20 MIN: CPT

## 2025-06-05 RX ORDER — METOPROLOL TARTRATE 25 MG/1
25 TABLET, FILM COATED ORAL 2 TIMES DAILY
Qty: 200 TABLET | Refills: 3 | Status: SHIPPED | OUTPATIENT
Start: 2025-06-05

## 2025-06-05 ASSESSMENT — ENCOUNTER SYMPTOMS
MUSCULOSKELETAL NEGATIVE: 1
PND: 0
GASTROINTESTINAL NEGATIVE: 1
EYES NEGATIVE: 1
PALPITATIONS: 0
NEUROLOGICAL NEGATIVE: 1
NERVOUS/ANXIOUS: 0
INSOMNIA: 1
ORTHOPNEA: 0
CONSTITUTIONAL NEGATIVE: 1
SHORTNESS OF BREATH: 0
DEPRESSION: 0

## 2025-06-05 ASSESSMENT — FIBROSIS 4 INDEX: FIB4 SCORE: 1.21

## 2025-06-05 NOTE — PROGRESS NOTES
"Chief Complaint   Patient presents with    Follow-Up     Hx of CABG    Hyperlipidemia    Other     CAD in native artery       Subjective     Obinna Bryan is a 45 y.o. male who presents today for [facility] follow-up.  He has a history of HLD, asthma, fatty liver, MIRELLA.    He was undergoing outpatient angiogram for recurrent anginal type symptoms, found to have multivessel disease and \"ominous proximal LAD lesion\" prompting urgent CTS consult.  Underwent CABG x 4 on 2025 (LIMA to LAD, R SVG to ramus, OM1, PDA, left atrial appendage ligation)    2025 he has been doing well cardiac wise, he reports that he has not been sleeping well but this has been the case since he became a  and was working extreme hours.  He has been using his IS pulling about [id number], going on daily walks which she is tolerating well.  He does have some surgical site pain but no deep chest pain, no shortness of breath, trace lower extremity edema, no palpitations    Past Medical History[1]  Past Surgical History[2]  Family History   Problem Relation Age of Onset    Diabetes Maternal Uncle     Stroke Maternal Grandmother     Cancer Neg Hx     Ovarian Cancer Neg Hx     Tubal Cancer Neg Hx     Peritoneal Cancer Neg Hx     Colorectal Cancer Neg Hx     Breast Cancer Neg Hx     Heart Disease Neg Hx      Social History     Socioeconomic History    Marital status:      Spouse name: Not on file    Number of children: Not on file    Years of education: Not on file    Highest education level: Not on file   Occupational History    Not on file   Tobacco Use    Smoking status: Former     Current packs/day: 0.00     Types: Cigarettes     Quit date:      Years since quittin.4    Smokeless tobacco: Former     Types: Chew   Vaping Use    Vaping status: Never Used   Substance and Sexual Activity    Alcohol use: Not Currently    Drug use: No    Sexual activity: Not on file   Other Topics Concern    Not on file   Social History " "Narrative    Not on file     Social Drivers of Health     Financial Resource Strain: Not on file   Food Insecurity: No Food Insecurity (5/7/2025)    Hunger Vital Sign     Worried About Running Out of Food in the Last Year: Never true     Ran Out of Food in the Last Year: Never true   Transportation Needs: No Transportation Needs (5/7/2025)    PRAPARE - Transportation     Lack of Transportation (Medical): No     Lack of Transportation (Non-Medical): No   Physical Activity: Not on file   Stress: Not on file   Social Connections: Not on file   Intimate Partner Violence: Not At Risk (5/7/2025)    Humiliation, Afraid, Rape, and Kick questionnaire     Fear of Current or Ex-Partner: No     Emotionally Abused: No     Physically Abused: No     Sexually Abused: No   Housing Stability: Low Risk  (5/7/2025)    Housing Stability Vital Sign     Unable to Pay for Housing in the Last Year: No     Number of Times Moved in the Last Year: 0     Homeless in the Last Year: No     Allergies[3]  Encounter Medications[4]  Review of Systems   Constitutional: Negative.    HENT: Negative.     Eyes: Negative.    Respiratory:  Negative for shortness of breath.    Cardiovascular:  Positive for leg swelling. Negative for chest pain, palpitations, orthopnea and PND.        Incisional pain not exceeding the expected postoperative pain level   Gastrointestinal: Negative.    Genitourinary: Negative.    Musculoskeletal: Negative.    Skin: Negative.    Neurological: Negative.    Endo/Heme/Allergies: Negative.    Psychiatric/Behavioral:  Negative for depression. The patient has insomnia. The patient is not nervous/anxious.               Objective     BP 92/64 (BP Location: Left arm, Patient Position: Sitting, BP Cuff Size: Adult)   Pulse 86   Resp 16   Ht 1.803 m (5' 11\")   Wt 112 kg (246 lb)   SpO2 96%   BMI 34.31 kg/m²     Physical Exam  Constitutional:       Appearance: Normal appearance.   HENT:      Head: Normocephalic.   Neck:      Vascular: " No JVD.   Cardiovascular:      Rate and Rhythm: Normal rate and regular rhythm.      Pulses: Normal pulses.      Heart sounds: Normal heart sounds. No murmur heard.     No friction rub.      Comments: Minimal amount of edema in his ankles  Pulmonary:      Effort: Pulmonary effort is normal.      Breath sounds: Normal breath sounds.   Abdominal:      Palpations: Abdomen is soft.   Musculoskeletal:         General: Normal range of motion.      Right lower le+ Edema present.      Left lower le+ Edema present.   Skin:     General: Skin is warm and dry.   Neurological:      General: No focal deficit present.      Mental Status: He is alert and oriented to person, place, and time.   Psychiatric:         Mood and Affect: Mood normal.         Behavior: Behavior normal.            Lab Results   Component Value Date/Time    WBC 14.9 (H) 2025 12:10 AM    RBC 4.46 (L) 2025 12:10 AM    HEMOGLOBIN 14.2 2025 12:10 AM    HEMATOCRIT 40.2 (L) 2025 12:10 AM    MCV 90.1 2025 12:10 AM    MCH 31.8 2025 12:10 AM    MCHC 35.3 2025 12:10 AM    MPV 8.8 (L) 2025 12:10 AM    NEUTSPOLYS 51.30 2025 08:32 AM    LYMPHOCYTES 35.10 2025 08:32 AM    MONOCYTES 9.20 2025 08:32 AM    EOSINOPHILS 3.20 2025 08:32 AM    BASOPHILS 0.90 2025 08:32 AM      Lab Results   Component Value Date/Time    CHOLSTRLTOT 97 (L) 2025 02:01 AM    LDL 47 2025 02:01 AM    HDL 31 (A) 2025 02:01 AM    TRIGLYCERIDE 93 2025 02:01 AM       Lab Results   Component Value Date/Time    SODIUM 139 2025 12:10 AM    POTASSIUM 4.2 2025 12:10 AM    CHLORIDE 104 2025 12:10 AM    CO2 25 2025 12:10 AM    GLUCOSE 106 (H) 2025 12:10 AM    BUN 12 2025 12:10 AM    CREATININE 0.87 2025 12:10 AM     Lab Results   Component Value Date/Time    ALKPHOSPHAT 71 2025 03:04 PM    ASTSGOT 35 2025 03:04 PM    ALTSGPT 40 2025 03:04 PM     TBILIRUBIN 0.5 05/05/2025 03:04 PM          Assessment & Plan     1. Hx of CABG        2. Obesity, unspecified class, unspecified obesity type, unspecified whether serious comorbidity present        3. Mixed hyperlipidemia        4. CAD in native artery        5. Agatston coronary artery calcium score greater than 400            Medical Decision Making: Today's Assessment/Status/Plan:          Status post CABG x 4 on 5/8/2025  HLD  -Doing well postop  -CABG: DAPT with aspirin and Plavix for 3 months then ASA alone  -Continue statin: Rosuvastatin 20 mg daily, most recent LDL 47, LDL goal of less than 70  -Continue metoprolol 25 mg twice daily  -echo in 3 months   - referral to cardiac rehab, starting June 23  -Continue postop precautions:  1. NO driving for 4 weeks after surgery. You may ride as a passenger.  2. NO lifting of any item over 10 lbs (e.g. gallon of milk) for 6 weeks after surgery.  3. DO walk as much as possible! Walk a minimum of once a day. Depending on your fatigue and comfort level, you may walk as much as you wish. There is no maximum.  4. Other physical activities (sex, housework, gardening, etc.) are OK after 4 weeks       Follow up in 3 months with echocardiogram    This note was dictated using Dragon speech recognition software.                   [1]   Past Medical History:  Diagnosis Date    Anesthesia     combative when waking up (PTSD)    Asthma     Fatty liver     High cholesterol     Hypertension     Psychiatric problem     PTSD    Sleep apnea     suspected    Snoring    [2]   Past Surgical History:  Procedure Laterality Date    MULTIPLE CORONARY ARTERY BYPASS ENDO VEIN HARVEST  5/8/2025    Procedure: CABG, WITH ENDOSCOPIC VEIN PROCUREMENT  X4;  Surgeon: Elayne Valdovinos M.D.;  Location: [facility];  Service: Cardiothoracic    CLIPPING, LEFT ATRIAL APPENDAGE  5/8/2025    Procedure: CLIPPING, LEFT ATRIAL APPENDAGE;  Surgeon: Elayne Valdovinos M.D.;  Location: [facility];   Service: Cardiothoracic    ECHOCARDIOGRAM, TRANSESOPHAGEAL, INTRAOPERATIVE  5/8/2025    Procedure: ECHOCARDIOGRAM, TRANSESOPHAGEAL, INTRAOPERATIVE;  Surgeon: Elayne Valdovinos M.D.;  Location: [facility];  Service: Cardiothoracic    HAND SURGERY      rt hand metal plate broken bone     [id number]      lt knee legament     SHOULDER SURGERY      rt shoulder    [3] No Known Allergies  [4]   Outpatient Encounter Medications as of 6/5/2025   Medication Sig Dispense Refill    acetaminophen (TYLENOL) 500 MG Tab Take 2 Tablets by mouth every 6 hours.      clopidogrel (PLAVIX) 75 MG Tab Take 1 Tablet by mouth every day. 30 Tablet 2    metoprolol tartrate (LOPRESSOR) 25 MG Tab Take 1 Tablet by mouth 2 times a day. 60 Tablet 2    rosuvastatin (CRESTOR) 20 MG Tab Take 1 Tablet by mouth every day. 90 Tablet 3    albuterol 108 (90 Base) MCG/ACT Aero Soln inhalation aerosol Inhale 2 Puffs every four hours as needed for Shortness of Breath. 1 Each 0    aspirin 81 MG EC tablet Take 81 mg by mouth every day.       No facility-administered encounter medications on file as of 6/5/2025.

## 2025-06-05 NOTE — LETTER
June 5, 2025    To Whom It May Concern:         This is confirmation that Obinna SCHUMACHER Lisaevan attended his scheduled appointment with CHANNING Mendoza on 6/05/25. Recommend not returning to work for at least the next 6 weeks. Official return to work date to be determined. Anticipate MMI (maximal medical improvement in 6-12 months). Will have need for lifelong cardiac follow ups.          If you have any questions please do not hesitate to call me at the phone number listed below.    Sincerely,          MICHAEL Mendoza.  [phone]                 Detail Level: Detailed

## 2025-06-08 PROCEDURE — RXMED WILLOW AMBULATORY MEDICATION CHARGE: Performed by: NURSE PRACTITIONER

## 2025-06-09 ENCOUNTER — PHARMACY VISIT (OUTPATIENT)
Dept: PHARMACY | Facility: MEDICAL CENTER | Age: 45
End: 2025-06-09
Payer: COMMERCIAL

## 2025-06-09 ENCOUNTER — OFFICE VISIT (OUTPATIENT)
Facility: MEDICAL CENTER | Age: 45
End: 2025-06-09
Payer: COMMERCIAL

## 2025-06-09 VITALS
SYSTOLIC BLOOD PRESSURE: 116 MMHG | HEIGHT: 71 IN | HEART RATE: 97 BPM | WEIGHT: 244.71 LBS | BODY MASS INDEX: 34.26 KG/M2 | DIASTOLIC BLOOD PRESSURE: 62 MMHG | OXYGEN SATURATION: 95 % | TEMPERATURE: 97.5 F

## 2025-06-09 DIAGNOSIS — Z95.1 HX OF CABG: Primary | ICD-10-CM

## 2025-06-09 PROCEDURE — 99024 POSTOP FOLLOW-UP VISIT: CPT | Performed by: NURSE PRACTITIONER

## 2025-06-09 PROCEDURE — 3078F DIAST BP <80 MM HG: CPT | Performed by: NURSE PRACTITIONER

## 2025-06-09 PROCEDURE — 3074F SYST BP LT 130 MM HG: CPT | Performed by: NURSE PRACTITIONER

## 2025-06-09 ASSESSMENT — FIBROSIS 4 INDEX: FIB4 SCORE: 1.21

## 2025-06-23 ENCOUNTER — NON-PROVIDER VISIT (OUTPATIENT)
Dept: CARDIOLOGY | Facility: MEDICAL CENTER | Age: 45
End: 2025-06-23
Payer: COMMERCIAL

## 2025-06-23 DIAGNOSIS — Z95.1 S/P CABG X 4: Primary | ICD-10-CM

## 2025-06-23 PROCEDURE — G0423 INTENS CARDIAC REHAB NO EXER: HCPCS | Mod: 59 | Performed by: INTERNAL MEDICINE

## 2025-06-23 PROCEDURE — G0422 INTENS CARDIAC REHAB W/EXERC: HCPCS | Performed by: INTERNAL MEDICINE

## 2025-06-23 NOTE — PROGRESS NOTES
Patient arrived for initial 1:1 Intensive Cardiac Rehabilitation Consultation and Education session with the Registered Nurse.  A total of 60 minutes was spent with the patient during which time a focused cardiovascular assessment was completed and musculoskeletal limitations were addressed in preparation to safely start the exercise portion of the program.  Education regarding the program was explained including exercise goals, precautions, and target heart rate during exercise.  Nutrition goals were reviewed and patient was introduced to the Pritikin Program.  Stress management goals were discussed and patient concerns and questions were answered at this time. Patient arrived for education at 1130 and visit was concluded at 1230.  Exercise time was from 1230 to 1330.

## 2025-06-24 ENCOUNTER — NON-PROVIDER VISIT (OUTPATIENT)
Dept: CARDIOLOGY | Facility: MEDICAL CENTER | Age: 45
End: 2025-06-24
Payer: COMMERCIAL

## 2025-06-24 DIAGNOSIS — Z95.1 S/P CABG X 4: ICD-10-CM

## 2025-06-24 PROCEDURE — G0422 INTENS CARDIAC REHAB W/EXERC: HCPCS | Performed by: INTERNAL MEDICINE

## 2025-06-24 PROCEDURE — G0423 INTENS CARDIAC REHAB NO EXER: HCPCS | Mod: 59 | Performed by: INTERNAL MEDICINE

## 2025-06-24 NOTE — PROGRESS NOTES
Obinna WINSOME Bryan attended Intensive Cardiac Rehab today from 1000 to 1200. During his time he exercised and attended class.   His education today was a video titled: Heart Disease Risk Reduction. Patient received handouts and class discussion pertaining to the topic.

## 2025-06-25 ENCOUNTER — NON-PROVIDER VISIT (OUTPATIENT)
Dept: CARDIOLOGY | Facility: MEDICAL CENTER | Age: 45
End: 2025-06-25
Payer: COMMERCIAL

## 2025-06-25 DIAGNOSIS — Z95.1 S/P CABG X 4: ICD-10-CM

## 2025-06-25 PROCEDURE — G0423 INTENS CARDIAC REHAB NO EXER: HCPCS | Mod: 59 | Performed by: INTERNAL MEDICINE

## 2025-06-25 PROCEDURE — G0422 INTENS CARDIAC REHAB W/EXERC: HCPCS | Performed by: INTERNAL MEDICINE

## 2025-06-25 NOTE — PROGRESS NOTES
Obinna SCHUMACHER Lisatannaewelina attended Intensive Cardiac Rehab today from 1000 to 1200. During his time he exercised and attended class.   His education today was a cooking school titled: [employer]. Patient received handouts and class discussion pertaining to the topic.

## 2025-06-26 ENCOUNTER — NON-PROVIDER VISIT (OUTPATIENT)
Dept: CARDIOLOGY | Facility: MEDICAL CENTER | Age: 45
End: 2025-06-26
Payer: COMMERCIAL

## 2025-06-26 DIAGNOSIS — Z95.1 S/P CABG X 4: ICD-10-CM

## 2025-06-26 PROCEDURE — G0422 INTENS CARDIAC REHAB W/EXERC: HCPCS | Performed by: INTERNAL MEDICINE

## 2025-06-26 PROCEDURE — G0423 INTENS CARDIAC REHAB NO EXER: HCPCS | Mod: 59 | Performed by: INTERNAL MEDICINE

## 2025-06-26 NOTE — PROGRESS NOTES
Obinna WINSOME Bryan attended Intensive Cardiac Rehab today from 1000 to 1200. During his time he exercised and attended class.   His education today was a workshop titled: Mindfullness for Heart Health. Patient received handouts and class discussion pertaining to the topic.

## 2025-07-01 ENCOUNTER — APPOINTMENT (OUTPATIENT)
Dept: CARDIOLOGY | Facility: MEDICAL CENTER | Age: 45
End: 2025-07-01
Payer: COMMERCIAL

## 2025-07-01 DIAGNOSIS — Z95.1 S/P CABG X 4: ICD-10-CM

## 2025-07-01 PROCEDURE — G0422 INTENS CARDIAC REHAB W/EXERC: HCPCS | Performed by: INTERNAL MEDICINE

## 2025-07-01 PROCEDURE — G0423 INTENS CARDIAC REHAB NO EXER: HCPCS | Mod: 59 | Performed by: INTERNAL MEDICINE

## 2025-07-01 NOTE — PROGRESS NOTES
Obinna WINSOME Bryan attended Intensive Cardiac Rehab today from 1000 to 1200. During his time he exercised and attended class.   His education today was a video titled: Calorie Density. Patient received handouts and class discussion pertaining to the topic.

## 2025-07-02 ENCOUNTER — APPOINTMENT (OUTPATIENT)
Dept: CARDIOLOGY | Facility: MEDICAL CENTER | Age: 45
End: 2025-07-02
Payer: COMMERCIAL

## 2025-07-02 DIAGNOSIS — Z95.1 S/P CABG X 4: ICD-10-CM

## 2025-07-02 PROCEDURE — G0422 INTENS CARDIAC REHAB W/EXERC: HCPCS | Performed by: INTERNAL MEDICINE

## 2025-07-02 PROCEDURE — G0423 INTENS CARDIAC REHAB NO EXER: HCPCS | Mod: 59 | Performed by: INTERNAL MEDICINE

## 2025-07-02 NOTE — PROGRESS NOTES
Obinna WINSOME Bryan attended Intensive Cardiac Rehab today from 1000 to 1200. During his time he exercised and attended class.   His education today was a nutrition class titled: Personalize Your Pritikin Plate. Patient received handouts and class discussion pertaining to the topic.

## 2025-07-03 ENCOUNTER — APPOINTMENT (OUTPATIENT)
Dept: CARDIOLOGY | Facility: MEDICAL CENTER | Age: 45
End: 2025-07-03
Payer: COMMERCIAL

## 2025-07-03 DIAGNOSIS — Z95.1 S/P CABG X 4: ICD-10-CM

## 2025-07-03 PROCEDURE — G0423 INTENS CARDIAC REHAB NO EXER: HCPCS | Mod: 59 | Performed by: INTERNAL MEDICINE

## 2025-07-03 PROCEDURE — G0422 INTENS CARDIAC REHAB W/EXERC: HCPCS | Performed by: INTERNAL MEDICINE

## 2025-07-03 NOTE — PROGRESS NOTES
Obinna WINSOME Bryan attended Intensive Cardiac Rehab today from 1000 to 1200. During his time he exercised and attended class.   His education today was a nutrition workshop titled: Label Reading. Patient received handouts and class discussion pertaining to the topic.

## 2025-07-06 DIAGNOSIS — R93.1 AGATSTON CORONARY ARTERY CALCIUM SCORE GREATER THAN 400: ICD-10-CM

## 2025-07-06 DIAGNOSIS — E78.49 OTHER HYPERLIPIDEMIA: ICD-10-CM

## 2025-07-08 ENCOUNTER — APPOINTMENT (OUTPATIENT)
Dept: CARDIOLOGY | Facility: MEDICAL CENTER | Age: 45
End: 2025-07-08
Payer: COMMERCIAL

## 2025-07-08 DIAGNOSIS — Z95.1 S/P CABG X 4: ICD-10-CM

## 2025-07-08 PROCEDURE — G0422 INTENS CARDIAC REHAB W/EXERC: HCPCS | Mod: 59 | Performed by: INTERNAL MEDICINE

## 2025-07-08 PROCEDURE — G0423 INTENS CARDIAC REHAB NO EXER: HCPCS | Performed by: INTERNAL MEDICINE

## 2025-07-08 RX ORDER — ROSUVASTATIN CALCIUM 20 MG/1
20 TABLET, COATED ORAL DAILY
Qty: 90 TABLET | Refills: 0 | Status: SHIPPED | OUTPATIENT
Start: 2025-07-08 | End: 2025-07-25 | Stop reason: SDUPTHER

## 2025-07-08 NOTE — PROGRESS NOTES
"Nutrition Consult Note by:    brenna ricks  Patient name: Obinna Bryan  Date of visit:  7/8/26     Start Time:10:05 End Time: 11:00  Referring MD: Cecile      Referring diagnosis: CABG x 4          Diagnosis code:     Obinna Bryan is here today for Intensive Cardiac Rehab. Today the patient had their one-on-one RD appointment. This program includes Nutrition education and counseling following the Pritikin guidelines, which promote whole foods-fruits, vegetables, beans/legumes, whole grains, reduced saturated fats with lean animal protein and reduced fat dairy-while avoiding added salt, processed foods with excessive added sugars/salt/fat, trans/saturated fat, added sugar, tropical oils and heavy use of added oils.     Past Medical History:   MIRELLA, CAD    Nutrition-related Medications/Supplements:  Metoprolol, rosuvastatin    NUTRITION ASSESSMENT    Anthropometrics:  Height: 5'11\"     Weight: 225       BMI: 31               Goal weight: not stated    Protein requirement range: 100g/day , up to 160g/day if sets strength training goals       Nutrition-Related Labs:  Date:  7/8/25          BP: 114/75 Heart Rate: 86  Date: 5/1/25  Glucose: 106 (midnight)         HbA1c: NA  Lipids: tot 97, TG 93, HDLc 31, LDLc 47  Other labs:                  Digestive s/s:    none reported     Social History:    Current tobacco use (Y/N): none    ETOH (Y/N): not currently        If yes, frequency:    Primary Food  and/or preparer in the household: wife   Level of social/familial support? good    Diet Recall and Relevant Hx:    Breakfast: cheerios (sometime sweet, sometimes plain), breakfast tacos (eggs, cheese, tortillas)    Lunch: sandwich, sometimes chips    Dinner: protein and vege (e.g., chicken and cauliflower mash last night)    Snacks: sometimes salty, but mostly sweet - [employer] bars, occasional gummies    Beverages: mostly water     Diet change recommendations/Pt goals:   Pt starting to look at sodium. Pt would " benefit from more vegetable and fruit servings.    Current Exercise:  45-60mins at [facility] x3 days per week   OTHER: mostly light products around the house (pt is only 2 months out from surgery, just recently off 8# weight restriction)    Physical Activity:             Sedentary (little to no exercise)       Light (1-3x /wk)       Moderate (4-5x/wk)       Active (daily or intense exercise 3-4x/wk)       Very active (intense exercise 6-7x/wk)    Positive Changes Since Beginning the Program:   Looking at sodium and processing of food more now.    Barriers to Change/Biggest Struggles:  Feels like his diet is pretty healthy at this point.     Nutrition SMART Goals:  1. Water intake Recommended: >/=64 oz per day  2. Whole Grains/beans/legumes/seeds/starchy vegetables at 1/2 cup servings 5x day   3. Vegetables 1/2 C cooked or 1 C raw servings  3-5 per day  4. Fruits: 2/3 C servings  1-2 per day      Educational Handouts Provide  20 ways to add F&V  Dash product list  Healthy swaps  Protein content of foods    N/A - Diabetes related education     Nutrition Diagnosis:   Problem:  Nutrition-related knowledge deficit  Unsupported beliefs/attitudes about food or nutrition-related topics  Limited access to nutrition-related supplies  Self-monitoring deficit  Excessive sodium intake  Limited adherence to nutrition-related recommendations  Undesirable food choices  Physical inactivity  Altered nutrition-related lab values  Excessive fat intake  Not ready for diet and Lifestyle change  Excessive Sodium Intake  Excessive Energy Intake  Excessive Fat intake  At risk for weight loss and malnutrition related to frequent COPD exacerbations as evidenced by PO intake <50% of estimated nutrient needs  At risk for weight loss   Fluctuating blood sugars related to excessive carbohydrate intake as evidenced by inconsistent timing of meals and increased snacking    Related to: restrictions after surgery and to some extent, lifestyle choices      As Evidenced by: pt report    Follow Up PRN

## 2025-07-08 NOTE — PROGRESS NOTES
Obinna WINSOME Bryan attended Intensive Cardiac Rehab today from 1000 to 1200. During his time he exercised and attended class.   His education today was a 1on1 with the RD. Patient received handouts and class discussion pertaining to the topic.

## 2025-07-09 ENCOUNTER — APPOINTMENT (OUTPATIENT)
Dept: CARDIOLOGY | Facility: MEDICAL CENTER | Age: 45
End: 2025-07-09
Payer: COMMERCIAL

## 2025-07-09 DIAGNOSIS — Z95.1 S/P CABG X 4: ICD-10-CM

## 2025-07-09 PROCEDURE — G0422 INTENS CARDIAC REHAB W/EXERC: HCPCS | Performed by: INTERNAL MEDICINE

## 2025-07-09 PROCEDURE — G0423 INTENS CARDIAC REHAB NO EXER: HCPCS | Mod: 59 | Performed by: INTERNAL MEDICINE

## 2025-07-09 NOTE — PROGRESS NOTES
Obinna SCHUMACHER Lisatannaewelina attended Intensive Cardiac Rehab today from 1000 to 1200. During his time he exercised and attended class.   His education today was a cooking school titled: Adding Flavor Sodium Free. Patient received handouts and class discussion pertaining to the topic.

## 2025-07-10 ENCOUNTER — APPOINTMENT (OUTPATIENT)
Dept: CARDIOLOGY | Facility: MEDICAL CENTER | Age: 45
End: 2025-07-10
Payer: COMMERCIAL

## 2025-07-10 DIAGNOSIS — Z95.1 S/P CABG X 4: ICD-10-CM

## 2025-07-10 PROCEDURE — G0422 INTENS CARDIAC REHAB W/EXERC: HCPCS | Performed by: INTERNAL MEDICINE

## 2025-07-10 PROCEDURE — G0423 INTENS CARDIAC REHAB NO EXER: HCPCS | Mod: 59 | Performed by: INTERNAL MEDICINE

## 2025-07-10 NOTE — PROGRESS NOTES
Obinna WINSOME Bryan attended Intensive Cardiac Rehab today from 1000 to 1200. During his time he exercised and attended class.   His education today was a workshop titled: Exercise Basics. Patient received handouts and class discussion pertaining to the topic.

## 2025-07-15 ENCOUNTER — APPOINTMENT (OUTPATIENT)
Dept: CARDIOLOGY | Facility: MEDICAL CENTER | Age: 45
End: 2025-07-15
Payer: COMMERCIAL

## 2025-07-15 DIAGNOSIS — Z95.1 S/P CABG X 4: ICD-10-CM

## 2025-07-15 PROCEDURE — G0422 INTENS CARDIAC REHAB W/EXERC: HCPCS | Performed by: INTERNAL MEDICINE

## 2025-07-15 PROCEDURE — G0423 INTENS CARDIAC REHAB NO EXER: HCPCS | Mod: 59 | Performed by: INTERNAL MEDICINE

## 2025-07-15 NOTE — PROGRESS NOTES
Obinna WINSOME Bryan attended Intensive Cardiac Rehab today from 1000 to 1200. During his time he exercised and attended class.   His education today was a VIDEO titled: LABEL READING. Patient received handouts and class discussion pertaining to the topic.

## 2025-07-16 ENCOUNTER — APPOINTMENT (OUTPATIENT)
Dept: CARDIOLOGY | Facility: MEDICAL CENTER | Age: 45
End: 2025-07-16
Payer: COMMERCIAL

## 2025-07-16 DIAGNOSIS — Z95.1 S/P CABG X 4: ICD-10-CM

## 2025-07-16 PROCEDURE — G0422 INTENS CARDIAC REHAB W/EXERC: HCPCS | Performed by: INTERNAL MEDICINE

## 2025-07-16 PROCEDURE — G0423 INTENS CARDIAC REHAB NO EXER: HCPCS | Mod: 59 | Performed by: INTERNAL MEDICINE

## 2025-07-16 NOTE — PROGRESS NOTES
Obinna SCHUMACHER Lisaevan attended Intensive Cardiac Rehab today from 1000 to 1200. During his time he exercised and attended class.   His education today was a cooking school titled: Fast and Healthy Breakfast. Patient received handouts and class discussion pertaining to the topic.

## 2025-07-17 ENCOUNTER — APPOINTMENT (OUTPATIENT)
Dept: CARDIOLOGY | Facility: MEDICAL CENTER | Age: 45
End: 2025-07-17
Payer: COMMERCIAL

## 2025-07-17 DIAGNOSIS — Z95.1 S/P CABG X 4: ICD-10-CM

## 2025-07-17 PROCEDURE — G0422 INTENS CARDIAC REHAB W/EXERC: HCPCS | Performed by: INTERNAL MEDICINE

## 2025-07-17 PROCEDURE — G0423 INTENS CARDIAC REHAB NO EXER: HCPCS | Mod: 59 | Performed by: INTERNAL MEDICINE

## 2025-07-17 NOTE — PROGRESS NOTES
Obinna WINSOME Bryan attended Intensive Cardiac Rehab today from 1000 to 1200. During his time he exercised and attended class.   His education today was a WORKSHOP titled: FOCUSED GOALS-SUSTAINABLE CHANGE. Patient received handouts and class discussion pertaining to the topic.

## 2025-07-22 ENCOUNTER — APPOINTMENT (OUTPATIENT)
Dept: CARDIOLOGY | Facility: MEDICAL CENTER | Age: 45
End: 2025-07-22
Payer: COMMERCIAL

## 2025-07-22 DIAGNOSIS — Z95.1 S/P CABG X 4: ICD-10-CM

## 2025-07-22 PROCEDURE — 93797 PHYS/QHP OP CAR RHAB WO ECG: CPT | Performed by: INTERNAL MEDICINE

## 2025-07-22 NOTE — PROGRESS NOTES
Obinna WINSOME Bryan attended Intensive Cardiac Rehab today from 1000 to 1200. During his time he exercised and attended class.   His education today was a VIDEO titled: EXERCISE ACTION PLAN.  Patient received handouts and class discussion pertaining to the topic.

## 2025-07-23 ENCOUNTER — APPOINTMENT (OUTPATIENT)
Dept: CARDIOLOGY | Facility: MEDICAL CENTER | Age: 45
End: 2025-07-23
Payer: COMMERCIAL

## 2025-07-23 DIAGNOSIS — Z95.1 S/P CABG X 4: ICD-10-CM

## 2025-07-23 PROCEDURE — 93797 PHYS/QHP OP CAR RHAB WO ECG: CPT | Performed by: INTERNAL MEDICINE

## 2025-07-23 NOTE — PROGRESS NOTES
Obinna SCHUMACHER Lisaevan attended Intensive Cardiac Rehab today from 1000 to 1200. During his time he exercised and attended class.   His education today  was a COOKING SCHOOL WORKSHOP titled: "[employer] PLANT PROTEINS. Patient received handouts and class discussion pertaining to the topic.

## 2025-07-24 ENCOUNTER — APPOINTMENT (OUTPATIENT)
Dept: CARDIOLOGY | Facility: MEDICAL CENTER | Age: 45
End: 2025-07-24
Payer: COMMERCIAL

## 2025-07-24 DIAGNOSIS — Z95.1 S/P CABG X 4: ICD-10-CM

## 2025-07-24 PROCEDURE — 93797 PHYS/QHP OP CAR RHAB WO ECG: CPT | Performed by: INTERNAL MEDICINE

## 2025-07-24 NOTE — PROGRESS NOTES
Obinna WINSOME Bryan attended Intensive Cardiac Rehab today from 1000 to 1200. During his time he exercised and attended class.  His education today was a Workshop titled Fueling a Healthy Body. Education included handouts, discussion, and questions and answers.

## 2025-07-25 ENCOUNTER — OFFICE VISIT (OUTPATIENT)
Dept: CARDIOLOGY | Facility: MEDICAL CENTER | Age: 45
End: 2025-07-25
Payer: COMMERCIAL

## 2025-07-25 VITALS
SYSTOLIC BLOOD PRESSURE: 112 MMHG | HEIGHT: 71 IN | OXYGEN SATURATION: 98 % | HEART RATE: 82 BPM | BODY MASS INDEX: 36.12 KG/M2 | DIASTOLIC BLOOD PRESSURE: 70 MMHG | RESPIRATION RATE: 16 BRPM | WEIGHT: 258 LBS

## 2025-07-25 DIAGNOSIS — Z95.1 HX OF CABG: Primary | ICD-10-CM

## 2025-07-25 DIAGNOSIS — I25.10 CAD IN NATIVE ARTERY: ICD-10-CM

## 2025-07-25 DIAGNOSIS — E78.49 OTHER HYPERLIPIDEMIA: ICD-10-CM

## 2025-07-25 DIAGNOSIS — E78.2 MIXED HYPERLIPIDEMIA: ICD-10-CM

## 2025-07-25 DIAGNOSIS — R93.1 AGATSTON CORONARY ARTERY CALCIUM SCORE GREATER THAN 400: ICD-10-CM

## 2025-07-25 PROCEDURE — 99212 OFFICE O/P EST SF 10 MIN: CPT

## 2025-07-25 RX ORDER — ROSUVASTATIN CALCIUM 20 MG/1
20 TABLET, COATED ORAL DAILY
Qty: 100 TABLET | Refills: 3 | Status: SHIPPED | OUTPATIENT
Start: 2025-07-25

## 2025-07-25 ASSESSMENT — ENCOUNTER SYMPTOMS
PND: 0
SHORTNESS OF BREATH: 0
PALPITATIONS: 0
GASTROINTESTINAL NEGATIVE: 1
ORTHOPNEA: 0
DEPRESSION: 0
MUSCULOSKELETAL NEGATIVE: 1
CONSTITUTIONAL NEGATIVE: 1
NEUROLOGICAL NEGATIVE: 1
NERVOUS/ANXIOUS: 0
EYES NEGATIVE: 1

## 2025-07-25 ASSESSMENT — FIBROSIS 4 INDEX: FIB4 SCORE: 1.21

## 2025-07-25 NOTE — LETTER
July 25, 2025    To Whom It May Concern:         This is confirmation that Obinna SCHUMACHER Lisaevan attended his scheduled appointment with CHANNING Mendoza on 7/25/25. Recommend not returning to work for at least the next 6 weeks. Official return to work date to be determined. Anticipate MMI (maximal medical improvement in 6-12 months). Will have need for lifelong cardiac follow ups.          If you have any questions please do not hesitate to call me at the phone number listed below.    Sincerely,          MICHAEL Mendoza.  [phone]

## 2025-07-25 NOTE — PROGRESS NOTES
Chief Complaint   Patient presents with    Follow-Up     Hx of CABG      Hyperlipidemia       Subjective     Obinna Bryan is a 45 y.o. male who presents today for follow-up. He has a history of CAD status post CABG x 4 on 2025  (LIMA to LAD, R SVG to ramus, OM1, PDA, left atrial appendage ligation), HLD, asthma, fatty liver, MIRELLA.      2025 he has been doing well cardiac wise, he reports that he has not been sleeping well but this has been the case since he became a  and was working extreme hours.  He has been using his IS pulling about [id number], going on daily walks which she is tolerating well.  He does have some surgical site pain but no deep chest pain, no shortness of breath, trace lower extremity edema, no palpitations.  Ordered echocardiogram for him and recommended follow-up in 3 months    2025 he presents today with his wife.  Overall he has been doing well, he continues to go to cardiac rehab.  He has been struggling to lift heavier than 15 pounds at a time.  He has occasional chest pains but not outside of the expected postoperative course.  He does report that after cardiac rehab he will feel pretty exhausted and needs to take a nap    Past Medical History[1]  Past Surgical History[2]  Family History   Problem Relation Age of Onset    Diabetes Maternal Uncle     Stroke Maternal Grandmother     Cancer Neg Hx     Ovarian Cancer Neg Hx     Tubal Cancer Neg Hx     Peritoneal Cancer Neg Hx     Colorectal Cancer Neg Hx     Breast Cancer Neg Hx     Heart Disease Neg Hx      Social History     Socioeconomic History    Marital status:      Spouse name: Not on file    Number of children: Not on file    Years of education: Not on file    Highest education level: Not on file   Occupational History    Not on file   Tobacco Use    Smoking status: Former     Current packs/day: 0.00     Types: Cigarettes     Quit date:      Years since quittin.5    Smokeless tobacco: Former  "    Types: Chew   Vaping Use    Vaping status: Never Used   Substance and Sexual Activity    Alcohol use: Yes     Comment: occ    Drug use: No    Sexual activity: Not on file   Other Topics Concern    Not on file   Social History Narrative    Not on file     Social Drivers of Health     Financial Resource Strain: Not on file   Food Insecurity: No Food Insecurity (5/7/2025)    Hunger Vital Sign     Worried About Running Out of Food in the Last Year: Never true     Ran Out of Food in the Last Year: Never true   Transportation Needs: No Transportation Needs (5/7/2025)    PRAPARE - Transportation     Lack of Transportation (Medical): No     Lack of Transportation (Non-Medical): No   Physical Activity: Not on file   Stress: Not on file   Social Connections: Not on file   Intimate Partner Violence: Not At Risk (5/7/2025)    Humiliation, Afraid, Rape, and Kick questionnaire     Fear of Current or Ex-Partner: No     Emotionally Abused: No     Physically Abused: No     Sexually Abused: No   Housing Stability: Low Risk  (5/7/2025)    Housing Stability Vital Sign     Unable to Pay for Housing in the Last Year: No     Number of Times Moved in the Last Year: 0     Homeless in the Last Year: No     Allergies[3]  Encounter Medications[4]  Review of Systems   Constitutional: Negative.    HENT: Negative.     Eyes: Negative.    Respiratory:  Negative for shortness of breath.    Cardiovascular:  Positive for chest pain. Negative for palpitations, orthopnea, leg swelling and PND.   Gastrointestinal: Negative.    Genitourinary: Negative.    Musculoskeletal: Negative.    Skin: Negative.    Neurological: Negative.    Endo/Heme/Allergies: Negative.    Psychiatric/Behavioral:  Negative for depression. The patient is not nervous/anxious.               Objective     /70 (BP Location: Left arm, Patient Position: Sitting, BP Cuff Size: Adult)   Pulse 82   Resp 16   Ht 1.803 m (5' 11\")   Wt 117 kg (258 lb)   SpO2 98%   BMI 35.98 " kg/m²     Physical Exam  Constitutional:       Appearance: Normal appearance. He is obese.   HENT:      Head: Normocephalic.   Neck:      Vascular: No JVD.   Cardiovascular:      Rate and Rhythm: Normal rate and regular rhythm.      Pulses: Normal pulses.      Heart sounds: Normal heart sounds. No murmur heard.     No friction rub.   Pulmonary:      Effort: Pulmonary effort is normal.      Breath sounds: Normal breath sounds.   Abdominal:      Palpations: Abdomen is soft.   Musculoskeletal:         General: Normal range of motion.      Right lower leg: No edema.      Left lower leg: No edema.   Skin:     General: Skin is warm and dry.   Neurological:      General: No focal deficit present.      Mental Status: He is alert and oriented to person, place, and time.   Psychiatric:         Mood and Affect: Mood normal.         Behavior: Behavior normal.            Lab Results   Component Value Date/Time    WBC 14.9 (H) 05/12/2025 12:10 AM    RBC 4.46 (L) 05/12/2025 12:10 AM    HEMOGLOBIN 14.2 05/12/2025 12:10 AM    HEMATOCRIT 40.2 (L) 05/12/2025 12:10 AM    MCV 90.1 05/12/2025 12:10 AM    MCH 31.8 05/12/2025 12:10 AM    MCHC 35.3 05/12/2025 12:10 AM    MPV 8.8 (L) 05/12/2025 12:10 AM    NEUTSPOLYS 51.30 04/11/2025 08:32 AM    LYMPHOCYTES 35.10 04/11/2025 08:32 AM    MONOCYTES 9.20 04/11/2025 08:32 AM    EOSINOPHILS 3.20 04/11/2025 08:32 AM    BASOPHILS 0.90 04/11/2025 08:32 AM      Lab Results   Component Value Date/Time    CHOLSTRLTOT 97 (L) 04/09/2025 02:01 AM    LDL 47 04/09/2025 02:01 AM    HDL 31 (A) 04/09/2025 02:01 AM    TRIGLYCERIDE 93 04/09/2025 02:01 AM       Lab Results   Component Value Date/Time    SODIUM 139 05/12/2025 12:10 AM    POTASSIUM 4.2 05/12/2025 12:10 AM    CHLORIDE 104 05/12/2025 12:10 AM    CO2 25 05/12/2025 12:10 AM    GLUCOSE 106 (H) 05/12/2025 12:10 AM    BUN 12 05/12/2025 12:10 AM    CREATININE 0.87 05/12/2025 12:10 AM     Lab Results   Component Value Date/Time    ALKPHOSPHAT 71 05/05/2025  03:04 PM    ASTSGOT 35 05/05/2025 03:04 PM    ALTSGPT 40 05/05/2025 03:04 PM    TBILIRUBIN 0.5 05/05/2025 03:04 PM          Assessment & Plan     1. Hx of CABG        2. CAD in native artery        3. Mixed hyperlipidemia            Medical Decision Making: Today's Assessment/Status/Plan:        Status post CABG x 4 on 5/8/2025  HLD  -Doing well postop  - Continue aspirin  -Continue statin: Rosuvastatin 20 mg daily, most recent LDL 47, LDL goal of less than 70  -Continue metoprolol 25 mg twice daily  -echo 3 months postop  - He is unable to sit comfortably for more than 3 hours at a time  - He is presently limited to lifting more than 15 lb   -He continues to attend cardiac rehab and has been progressing through the program well    Follow-up in 4 weeks per Workmen's Comp. requirements    This note was dictated using Dragon speech recognition software.                           [1]   Past Medical History:  Diagnosis Date    Anesthesia     combative when waking up (PTSD)    Asthma     Fatty liver     High cholesterol     Hypertension     Psychiatric problem     PTSD    Sleep apnea     suspected    Snoring    [2]   Past Surgical History:  Procedure Laterality Date    MULTIPLE CORONARY ARTERY BYPASS ENDO VEIN HARVEST  5/8/2025    Procedure: CABG, WITH ENDOSCOPIC VEIN PROCUREMENT  X4;  Surgeon: Elayne Valdovinos M.D.;  Location: [facility];  Service: Cardiothoracic    CLIPPING, LEFT ATRIAL APPENDAGE  5/8/2025    Procedure: CLIPPING, LEFT ATRIAL APPENDAGE;  Surgeon: Elayne Valdovinos M.D.;  Location: SUR[facility]ERY [facility];  Service: Cardiothoracic    ECHOCARDIOGRAM, TRANSESOPHAGEAL, INTRAOPERATIVE  5/8/2025    Procedure: ECHOCARDIOGRAM, TRANSESOPHAGEAL, INTRAOPERATIVE;  Surgeon: Elayne Valdovinos M.D.;  Location: SURGERY [facility];  Service: Cardiothoracic    HAND SURGERY      rt hand metal plate broken bone     [id number]      lt knee legament     SHOULDER SURGERY      rt shoulder    [3] No Known Allergies  [4]    Outpatient Encounter Medications as of 7/25/2025   Medication Sig Dispense Refill    rosuvastatin (CRESTOR) 20 MG Tab Take 1 Tablet by mouth every day. 90 Tablet 0    metoprolol tartrate (LOPRESSOR) 25 MG Tab Take 1 Tablet by mouth 2 times a day. 200 Tablet 3    acetaminophen (TYLENOL) 500 MG Tab Take 2 Tablets by mouth every 6 hours. (Patient taking differently: Take 1,000 mg by mouth as needed.)      albuterol 108 (90 Base) MCG/ACT Aero Soln inhalation aerosol Inhale 2 Puffs every four hours as needed for Shortness of Breath. 1 Each 0    aspirin 81 MG EC tablet Take 81 mg by mouth every day.      clopidogrel (PLAVIX) 75 MG Tab Take 1 Tablet by mouth every day. (Patient not taking: Reported on 7/25/2025) 30 Tablet 2     No facility-administered encounter medications on file as of 7/25/2025.

## 2025-07-25 NOTE — LETTER
July 25, 2025    To Whom It May Concern:         This is confirmation that Obinna Bryan attended his scheduled appointment with CHANNING Mendoza on 7/25/25. Recommend not returning to work for at least the next 6 weeks. Official return to work date to be determined. Anticipate MMI (maximal medical improvement in 6-12 months). Will have need for lifelong cardiac follow ups.     - He is unable to sit comfortably for more than 3 hours at a time  - He is presently limited to lifting more than 15 lb   -He continues to attend cardiac rehab and has been progressing through the program well         If you have any questions please do not hesitate to call me at the phone number listed below.    Sincerely,          MICHAEL Mendoza.  [phone]

## 2025-07-29 ENCOUNTER — APPOINTMENT (OUTPATIENT)
Dept: CARDIOLOGY | Facility: MEDICAL CENTER | Age: 45
End: 2025-07-29
Payer: COMMERCIAL

## 2025-07-29 DIAGNOSIS — Z95.1 S/P CABG X 4: ICD-10-CM

## 2025-07-29 NOTE — PROGRESS NOTES
Obinna WINSOME Bryan attended Intensive Cardiac Rehab today from 1000 to 1200. During his time he exercised and attended class.  His education today was a Video titled Biomechanical Limitations. Education included handouts, discussion, and questions and answers.

## 2025-07-30 ENCOUNTER — APPOINTMENT (OUTPATIENT)
Dept: MEDICAL GROUP | Facility: PHYSICIAN GROUP | Age: 45
End: 2025-07-30
Payer: COMMERCIAL

## 2025-07-30 ENCOUNTER — APPOINTMENT (OUTPATIENT)
Dept: CARDIOLOGY | Facility: MEDICAL CENTER | Age: 45
End: 2025-07-30
Payer: COMMERCIAL

## 2025-07-30 DIAGNOSIS — Z95.1 S/P CABG X 4: ICD-10-CM

## 2025-07-30 NOTE — PROGRESS NOTES
Obinna WINSOME Bryan attended Intensive Cardiac Rehab today from 1000 to 1200. During his time he exercised and attended class.   His education today was a cooking shcool titled: Satisfying Salads and Dressings. Patient received handouts and class discussion pertaining to the topic.

## 2025-07-31 ENCOUNTER — APPOINTMENT (OUTPATIENT)
Dept: CARDIOLOGY | Facility: MEDICAL CENTER | Age: 45
End: 2025-07-31
Payer: COMMERCIAL

## 2025-07-31 DIAGNOSIS — Z95.1 S/P CABG X 4: ICD-10-CM

## 2025-07-31 NOTE — PROGRESS NOTES
Obinna Bryan attended Intensive Cardiac Rehab today from 1000 to 1200. During his time he exercised and attended class.  His education today was a Workshop titled Exercise Biomechanics. Education included handouts, discussion, and questions and answers.

## 2025-08-05 ENCOUNTER — APPOINTMENT (OUTPATIENT)
Dept: CARDIOLOGY | Facility: MEDICAL CENTER | Age: 45
End: 2025-08-05
Payer: COMMERCIAL

## 2025-08-06 ENCOUNTER — APPOINTMENT (OUTPATIENT)
Dept: CARDIOLOGY | Facility: MEDICAL CENTER | Age: 45
End: 2025-08-06
Payer: COMMERCIAL

## 2025-08-07 ENCOUNTER — APPOINTMENT (OUTPATIENT)
Dept: CARDIOLOGY | Facility: MEDICAL CENTER | Age: 45
End: 2025-08-07
Payer: COMMERCIAL

## 2025-08-12 ENCOUNTER — APPOINTMENT (OUTPATIENT)
Dept: CARDIOLOGY | Facility: MEDICAL CENTER | Age: 45
End: 2025-08-12
Payer: COMMERCIAL

## 2025-08-13 ENCOUNTER — APPOINTMENT (OUTPATIENT)
Dept: CARDIOLOGY | Facility: MEDICAL CENTER | Age: 45
End: 2025-08-13
Payer: COMMERCIAL

## 2025-08-14 ENCOUNTER — APPOINTMENT (OUTPATIENT)
Dept: CARDIOLOGY | Facility: MEDICAL CENTER | Age: 45
End: 2025-08-14
Payer: COMMERCIAL

## 2025-08-19 ENCOUNTER — APPOINTMENT (OUTPATIENT)
Dept: CARDIOLOGY | Facility: MEDICAL CENTER | Age: 45
End: 2025-08-19
Payer: COMMERCIAL

## 2025-08-20 ENCOUNTER — APPOINTMENT (OUTPATIENT)
Dept: CARDIOLOGY | Facility: MEDICAL CENTER | Age: 45
End: 2025-08-20
Payer: COMMERCIAL

## 2025-08-20 ENCOUNTER — HOSPITAL ENCOUNTER (OUTPATIENT)
Dept: CARDIOLOGY | Facility: MEDICAL CENTER | Age: 45
End: 2025-08-20
Payer: COMMERCIAL

## 2025-08-20 DIAGNOSIS — R06.02 SHORTNESS OF BREATH: ICD-10-CM

## 2025-08-20 DIAGNOSIS — Z95.1 HX OF CABG: ICD-10-CM

## 2025-08-20 LAB
LV EJECT FRACT MOD 2C 99903: 81.25
LV EJECT FRACT MOD 4C 99902: 69.15
LV EJECT FRACT MOD BP 99901: 76.09

## 2025-08-20 PROCEDURE — 93306 TTE W/DOPPLER COMPLETE: CPT

## 2025-08-20 PROCEDURE — 93306 TTE W/DOPPLER COMPLETE: CPT | Mod: 26 | Performed by: INTERNAL MEDICINE

## 2025-08-20 PROCEDURE — 700117 HCHG RX CONTRAST REV CODE 255

## 2025-08-20 RX ADMIN — HUMAN ALBUMIN MICROSPHERES AND PERFLUTREN 3 ML: 10; .22 INJECTION, SOLUTION INTRAVENOUS at 11:45

## 2025-08-21 ENCOUNTER — APPOINTMENT (OUTPATIENT)
Dept: CARDIOLOGY | Facility: MEDICAL CENTER | Age: 45
End: 2025-08-21
Payer: COMMERCIAL

## 2025-08-26 ENCOUNTER — APPOINTMENT (OUTPATIENT)
Dept: CARDIOLOGY | Facility: MEDICAL CENTER | Age: 45
End: 2025-08-26
Payer: COMMERCIAL

## 2025-08-27 ENCOUNTER — APPOINTMENT (OUTPATIENT)
Dept: CARDIOLOGY | Facility: MEDICAL CENTER | Age: 45
End: 2025-08-27
Payer: COMMERCIAL

## 2025-08-28 ENCOUNTER — APPOINTMENT (OUTPATIENT)
Dept: CARDIOLOGY | Facility: MEDICAL CENTER | Age: 45
End: 2025-08-28
Payer: COMMERCIAL

## 2025-09-02 ENCOUNTER — APPOINTMENT (OUTPATIENT)
Dept: CARDIOLOGY | Facility: MEDICAL CENTER | Age: 45
End: 2025-09-02
Payer: COMMERCIAL

## 2025-09-03 ENCOUNTER — APPOINTMENT (OUTPATIENT)
Dept: CARDIOLOGY | Facility: MEDICAL CENTER | Age: 45
End: 2025-09-03
Payer: COMMERCIAL

## 2025-09-04 ENCOUNTER — APPOINTMENT (OUTPATIENT)
Dept: CARDIOLOGY | Facility: MEDICAL CENTER | Age: 45
End: 2025-09-04
Payer: COMMERCIAL

## 2025-09-09 ENCOUNTER — APPOINTMENT (OUTPATIENT)
Dept: CARDIOLOGY | Facility: MEDICAL CENTER | Age: 45
End: 2025-09-09
Payer: COMMERCIAL

## 2025-09-10 ENCOUNTER — APPOINTMENT (OUTPATIENT)
Dept: CARDIOLOGY | Facility: MEDICAL CENTER | Age: 45
End: 2025-09-10
Payer: COMMERCIAL

## 2025-09-18 ENCOUNTER — APPOINTMENT (OUTPATIENT)
Dept: MEDICAL GROUP | Facility: PHYSICIAN GROUP | Age: 45
End: 2025-09-18
Payer: COMMERCIAL

## (undated) DEVICE — STOPCOCK MALE 4-WAY - (50/CA)

## (undated) DEVICE — KIT ENDOHARVEST SYSTEM 8 - MUST ORDER 5 AT A TIME

## (undated) DEVICE — TRANSDUCER BIFURCATED MONITORING KIT (10EA/CA)

## (undated) DEVICE — MICRODRIP PRIMARY VENTED 60 (48EA/CA) THIS WAS PART #2C8428 WHICH WAS DISCONTINUED

## (undated) DEVICE — PREP FAST FLOSEAL 5ML (6EA/CA)

## (undated) DEVICE — BLADE STERNUM SAW SURGICAL 32.0 X 6.4 MM STERILE (1/EA)

## (undated) DEVICE — SET LEADWIRE 5 LEAD BEDSIDE DISPOSABLE ECG (1SET OF 5/EA)

## (undated) DEVICE — COVER LIGHT HANDLE ALC PLUS DISP (18EA/BX)

## (undated) DEVICE — SUTURE OHS

## (undated) DEVICE — BLADE BEAVER 6900 MINI SHARP ALL AROUND (20/CA)

## (undated) DEVICE — BAG RESUSCITATION DISPOSABLE - WITH MASK (10 EA/CA)

## (undated) DEVICE — GOWN SURGEONS LARGE - (32/CA)

## (undated) DEVICE — KIT RADIAL ARTERY 20GA W/MAX BARRIER AND BIOPATCH (5EA/CA) #10740 IS FOR THE SET RADIAL ARTERIAL

## (undated) DEVICE — RETRACTOR OFF PUMP OCTO ONLY - 10/BX

## (undated) DEVICE — COVER CAMERA LENS LIGHT HANDLE STUBBY DISPOSABLE (20EA/BX)

## (undated) DEVICE — SET BIFURCATED BLOOD - (48EA/CS)

## (undated) DEVICE — SODIUM CHL. INJ. 0.9% 500ML (24EA/CA 50CA/PF)

## (undated) DEVICE — SET TUBING PNEUMOCLEAR HIGH FLOW SMOKE EVACUATION (10EA/BX)

## (undated) DEVICE — INSERT STEALTH #5 - (10/BX)

## (undated) DEVICE — D-5-W INJ. 500 ML - (24EA/CA)

## (undated) DEVICE — SET FLUID WARMING STANDARD FLOW - (10/CA)

## (undated) DEVICE — SUTURE NONABSORBABLE DOUBLEWIRE 8 CCS-2 14IN (12EA/PK)

## (undated) DEVICE — ELECTRODE DUAL RETURN W/ CORD - (50/PK)

## (undated) DEVICE — SOD. CHL. INJ. 0.9% 1000 ML - (14EA/CA 60CA/PF)

## (undated) DEVICE — LEAD PACING TEMP MYO - (12/BX)

## (undated) DEVICE — DECANTER FLD BLS - (50/CA)

## (undated) DEVICE — SUCTION INSTRUMENT YANKAUER BULBOUS TIP W/O VENT (50EA/CA)

## (undated) DEVICE — KIT INTROPERCUTANEOUS SHEATH - 8.5 FR W/MAX BARRIER AND BIOPATCH (5/CA)

## (undated) DEVICE — SET EXTENSION WITH 2 PORTS (48EA/CA) ***PART #2C8610 IS A SUBSTITUTE*****

## (undated) DEVICE — CONNECTOR HUBLESS DRAINAGE - ONE WAY (20/BX)

## (undated) DEVICE — TRAY SURESTEP FOLEY TEMP SENSING 16FR SNAP SECURE(10EA/CA) ORDER #18764 FOR TEMP FOLEY ONLY

## (undated) DEVICE — DEVICE CLOSURE FLEXIBLE SHAFT ATRICLIP L40 MM (1EA)

## (undated) DEVICE — SYS DLV COST CLS RM TEMP - INJECTATE (CO-SET II) (10EA/CA)

## (undated) DEVICE — CATHETER IV 14 GA X 2 ---SURG.& SDS ONLY---(200EA/CA)

## (undated) DEVICE — DRAIN SILICONE CLOSED WOUND SUCTION CHANNEL 24FR ROUND HUBLESS (10/CA)

## (undated) DEVICE — DRESSING SURGICAL NUKNIT 6X9 (10EA/BX)

## (undated) DEVICE — TUBING CLEARLINK DUO-VENT - C-FLO (48EA/CA)

## (undated) DEVICE — PACK VEIN - (19/CA)

## (undated) DEVICE — LACTATED RINGERS INJ 1000 ML - (14EA/CA 60CA/PF)

## (undated) DEVICE — CANISTER SUCTION 3000ML MECHANICAL FILTER AUTO SHUTOFF MEDI-VAC NONSTERILE LF DISP (40EA/CA)

## (undated) DEVICE — CONTAINER SPECIMEN BAG OR - STERILE 4 OZ W/LID (100EA/CA)

## (undated) DEVICE — KIT TOURNIQUET DLP (40EA/PK)

## (undated) DEVICE — SPONGE XRAY 8X4 STERL. 12PL - (10EA/TY 80TY/CA)

## (undated) DEVICE — BLOWER/MISTER (5EA/PK)

## (undated) DEVICE — SENSOR OXIMETER ADULT SPO2 RD SET (20EA/BX)

## (undated) DEVICE — SYSTEM CHEST DRAIN ADULT/PEDS W/AUTO TRANSFUSION CAPABILITY SAHARA (6EA/CA)

## (undated) DEVICE — PACK CV DRAPING/BASIN 2PART - (1/CA)

## (undated) DEVICE — SUTURE 5 SURGICAL STEEL V-40 - (12/BX) CCS CURRENT

## (undated) DEVICE — SPRING BULLDOG 1/2 FORCE BLUE - (10/BX)

## (undated) DEVICE — CATHETER THERMALDILUTION SWAN - (5EA/CA)